# Patient Record
Sex: MALE | Race: BLACK OR AFRICAN AMERICAN | NOT HISPANIC OR LATINO | Employment: FULL TIME | URBAN - METROPOLITAN AREA
[De-identification: names, ages, dates, MRNs, and addresses within clinical notes are randomized per-mention and may not be internally consistent; named-entity substitution may affect disease eponyms.]

---

## 2017-02-16 ENCOUNTER — LAB VISIT (OUTPATIENT)
Dept: LAB | Facility: HOSPITAL | Age: 62
End: 2017-02-16
Attending: NURSE PRACTITIONER
Payer: COMMERCIAL

## 2017-02-16 DIAGNOSIS — E11.9 TYPE 2 DIABETES MELLITUS WITHOUT COMPLICATION, WITHOUT LONG-TERM CURRENT USE OF INSULIN: ICD-10-CM

## 2017-02-16 LAB
ALBUMIN SERPL BCP-MCNC: 3.6 G/DL
ALP SERPL-CCNC: 103 U/L
ALT SERPL W/O P-5'-P-CCNC: 14 U/L
ANION GAP SERPL CALC-SCNC: 4 MMOL/L
AST SERPL-CCNC: 27 U/L
BILIRUB SERPL-MCNC: 0.5 MG/DL
BUN SERPL-MCNC: 11 MG/DL
CALCIUM SERPL-MCNC: 9.5 MG/DL
CHLORIDE SERPL-SCNC: 106 MMOL/L
CHOLEST/HDLC SERPL: 3.6 {RATIO}
CO2 SERPL-SCNC: 26 MMOL/L
CREAT SERPL-MCNC: 1.1 MG/DL
EST. GFR  (AFRICAN AMERICAN): >60 ML/MIN/1.73 M^2
EST. GFR  (NON AFRICAN AMERICAN): >60 ML/MIN/1.73 M^2
GLUCOSE SERPL-MCNC: 217 MG/DL
HDL/CHOLESTEROL RATIO: 28.1 %
HDLC SERPL-MCNC: 139 MG/DL
HDLC SERPL-MCNC: 39 MG/DL
LDLC SERPL CALC-MCNC: 88.4 MG/DL
NONHDLC SERPL-MCNC: 100 MG/DL
POTASSIUM SERPL-SCNC: 4.3 MMOL/L
PROT SERPL-MCNC: 7.7 G/DL
SODIUM SERPL-SCNC: 136 MMOL/L
TRIGL SERPL-MCNC: 58 MG/DL
TSH SERPL DL<=0.005 MIU/L-ACNC: 0.88 UIU/ML

## 2017-02-16 PROCEDURE — 83036 HEMOGLOBIN GLYCOSYLATED A1C: CPT

## 2017-02-16 PROCEDURE — 80053 COMPREHEN METABOLIC PANEL: CPT

## 2017-02-16 PROCEDURE — 80061 LIPID PANEL: CPT

## 2017-02-16 PROCEDURE — 84443 ASSAY THYROID STIM HORMONE: CPT

## 2017-02-16 PROCEDURE — 36415 COLL VENOUS BLD VENIPUNCTURE: CPT | Mod: PO

## 2017-02-17 LAB
ESTIMATED AVG GLUCOSE: 203 MG/DL
HBA1C MFR BLD HPLC: 8.7 %

## 2017-02-24 ENCOUNTER — PATIENT OUTREACH (OUTPATIENT)
Dept: ADMINISTRATIVE | Facility: HOSPITAL | Age: 62
End: 2017-02-24

## 2017-02-24 NOTE — LETTER
February 24, 2017        We are seeing Misha Simpson, 1955, at [facility]. Trino Snyder MD is their primary care physician. To help with our [place] maintenance records could you please send the following:     Copy of Last Eye Exam     Please fax to [facility] at [phone], attention Laura Lakhani LPN.    Thank-you in advance for your assistance. If you have any questions or concerns please contact me at [phone].     Laura Lakhani LPN  Care Coordination Department  [facility]

## 2017-02-24 NOTE — PROGRESS NOTES
Per note dated 8/18/16 by Marion Mart NP, pt had eye exam at [facility]. Faxed request for results of last eye exam from [facility].

## 2017-02-27 ENCOUNTER — OFFICE VISIT (OUTPATIENT)
Dept: FAMILY MEDICINE | Facility: CLINIC | Age: 62
End: 2017-02-27

## 2017-02-27 VITALS
DIASTOLIC BLOOD PRESSURE: 70 MMHG | WEIGHT: 185 LBS | BODY MASS INDEX: 25.06 KG/M2 | SYSTOLIC BLOOD PRESSURE: 125 MMHG | HEART RATE: 53 BPM | HEIGHT: 72 IN

## 2017-02-27 DIAGNOSIS — Z02.89 PHYSICAL EXAMINATION OF EMPLOYEE: Primary | ICD-10-CM

## 2017-02-27 PROCEDURE — 99203 OFFICE O/P NEW LOW 30 MIN: CPT | Mod: ,,, | Performed by: FAMILY MEDICINE

## 2017-02-27 RX ORDER — METFORMIN HYDROCHLORIDE 1000 MG/1
1000 TABLET ORAL 2 TIMES DAILY WITH MEALS
COMMUNITY
End: 2017-03-09 | Stop reason: SDUPTHER

## 2017-03-09 ENCOUNTER — OFFICE VISIT (OUTPATIENT)
Dept: DIABETES | Facility: CLINIC | Age: 62
End: 2017-03-09
Payer: COMMERCIAL

## 2017-03-09 VITALS
WEIGHT: 182.81 LBS | BODY MASS INDEX: 24.76 KG/M2 | HEIGHT: 72 IN | DIASTOLIC BLOOD PRESSURE: 76 MMHG | SYSTOLIC BLOOD PRESSURE: 128 MMHG

## 2017-03-09 DIAGNOSIS — E11.69 COMBINED HYPERLIPIDEMIA ASSOCIATED WITH TYPE 2 DIABETES MELLITUS: ICD-10-CM

## 2017-03-09 DIAGNOSIS — I15.2 HYPERTENSION ASSOCIATED WITH DIABETES: ICD-10-CM

## 2017-03-09 DIAGNOSIS — E78.2 COMBINED HYPERLIPIDEMIA ASSOCIATED WITH TYPE 2 DIABETES MELLITUS: ICD-10-CM

## 2017-03-09 DIAGNOSIS — E11.59 HYPERTENSION ASSOCIATED WITH DIABETES: ICD-10-CM

## 2017-03-09 DIAGNOSIS — F17.200 SMOKING: ICD-10-CM

## 2017-03-09 DIAGNOSIS — E11.9 TYPE 2 DIABETES MELLITUS WITHOUT COMPLICATION, WITHOUT LONG-TERM CURRENT USE OF INSULIN: Primary | ICD-10-CM

## 2017-03-09 LAB — GLUCOSE SERPL-MCNC: 323 MG/DL (ref 70–110)

## 2017-03-09 PROCEDURE — 82948 REAGENT STRIP/BLOOD GLUCOSE: CPT | Mod: S$GLB,,, | Performed by: NURSE PRACTITIONER

## 2017-03-09 PROCEDURE — 2022F DILAT RTA XM EVC RTNOPTHY: CPT | Mod: S$GLB,,, | Performed by: NURSE PRACTITIONER

## 2017-03-09 PROCEDURE — 3074F SYST BP LT 130 MM HG: CPT | Mod: S$GLB,,, | Performed by: NURSE PRACTITIONER

## 2017-03-09 PROCEDURE — 4010F ACE/ARB THERAPY RXD/TAKEN: CPT | Mod: S$GLB,,, | Performed by: NURSE PRACTITIONER

## 2017-03-09 PROCEDURE — 1160F RVW MEDS BY RX/DR IN RCRD: CPT | Mod: S$GLB,,, | Performed by: NURSE PRACTITIONER

## 2017-03-09 PROCEDURE — 99999 PR PBB SHADOW E&M-EST. PATIENT-LVL III: CPT | Mod: PBBFAC,,, | Performed by: NURSE PRACTITIONER

## 2017-03-09 PROCEDURE — 99214 OFFICE O/P EST MOD 30 MIN: CPT | Mod: S$GLB,,, | Performed by: NURSE PRACTITIONER

## 2017-03-09 PROCEDURE — 3045F PR MOST RECENT HEMOGLOBIN A1C LEVEL 7.0-9.0%: CPT | Mod: S$GLB,,, | Performed by: NURSE PRACTITIONER

## 2017-03-09 PROCEDURE — 3078F DIAST BP <80 MM HG: CPT | Mod: S$GLB,,, | Performed by: NURSE PRACTITIONER

## 2017-03-09 RX ORDER — METFORMIN HYDROCHLORIDE 1000 MG/1
1000 TABLET ORAL 2 TIMES DAILY WITH MEALS
Qty: 60 TABLET | Refills: 3 | Status: SHIPPED | OUTPATIENT
Start: 2017-03-09 | End: 2017-10-02 | Stop reason: SDUPTHER

## 2017-03-09 RX ORDER — LANCETS
EACH MISCELLANEOUS
Qty: 100 EACH | Refills: 11 | Status: SHIPPED | OUTPATIENT
Start: 2017-03-09 | End: 2019-03-25

## 2017-03-09 NOTE — MR AVS SNAPSHOT
[facility]  [address] [place] [address]  Phone: [phone]  Fax: [phone]                  Misha Simpson   3/9/2017 8:30 AM   Office Visit    Description:  Male : 1955   Provider:  CICI Chamberlain, MANGO   Department:  [facility]           Reason for Visit     Diabetes           Diagnoses this Visit        Comments    Type 2 diabetes mellitus without complication, without long-term current use of insulin    -  Primary     Smoking         Combined hyperlipidemia associated with type 2 diabetes mellitus         Hypertension associated with diabetes                To Do List           Future Appointments        Provider Department Dept Phone    3/30/2017 4:30 PM CICI Chamberlain, MANGO [facility] [phone]    2017 2:00 PM Sheila Mcgrath DPM [facility] [phone]      Goals (5 Years of Data)              Today    17    Blood Pressure < 140/90   128/76  128/76      HEMOGLOBIN A1C < 7.0       8.7      Follow-Up and Disposition     Return in about 3 weeks (around 3/30/2017) for Trulicity teaching.       These Medications        Disp Refills Start End    dulaglutide (TRULICITY) 0.75 mg/0.5 mL PnIj 4 Syringe 0 3/9/2017     Inject 0.75 mg into the skin every 7 days. - Subcutaneous    Pharmacy: [facility]  EMILY Erazo [address] Ph #: [phone]         [facility] On [facility]     [facility] -  Assistance  Registered nurses in the [facility] Center provide clinical advisement, health education, appointment booking, and other advisory services.  Call for this free service at [phone].             Medications           Message regarding Medications     Verify the changes and/or additions to your medication regime listed below are the same as discussed with your clinician today.  If any of these changes or additions are  incorrect, please notify your healthcare provider.        START taking these NEW medications        Refills    dulaglutide (TRULICITY) 0.75 mg/0.5 mL PnIj 0    Sig: Inject 0.75 mg into the skin every 7 days.    Class: Print    Route: Subcutaneous           Verify that the below list of medications is an accurate representation of the medications you are currently taking.  If none reported, the list may be blank. If incorrect, please contact your healthcare provider. Carry this list with you in case of emergency.           Current Medications     amlodipine (NORVASC) 5 MG tablet TAKE 1 TABLET BY MOUTH ONCE DAILY    benazepril (LOTENSIN) 40 MG tablet TAKE 1 TABLET BY MOUTH ONCE DAILY    blood sugar diagnostic (BLOOD GLUCOSE TEST) Strp Test glucose 3 x daily    hydrochlorothiazide (MICROZIDE) 12.5 mg capsule TAKE ONE CAPSULE BY MOUTH ONCE DAILY    lancets Misc As directed    metformin (GLUCOPHAGE) 1000 MG tablet Take 1,000 mg by mouth 2 (two) times daily with meals.     nateglinide (STARLIX) 120 MG tablet Take 1 tablet (120 mg total) by mouth 2 (two) times daily before meals.    sitagliptan-metformin (JANUMET) 50-1,000 mg per tablet Take 1 tablet by mouth 2 (two) times daily with meals.    dulaglutide (TRULICITY) 0.75 mg/0.5 mL PnIj Inject 0.75 mg into the skin every 7 days.           Clinical Reference Information           Your Vitals Were     BP Height Weight BMI       128/76 6' (1.829 m) 82.9 kg (182 lb 12.8 oz) 24.79 kg/m2       Blood Pressure          Most Recent Value    BP  128/76      Allergies as of 3/9/2017     Aspirin      Immunizations Administered on Date of Encounter - 3/9/2017     None      Orders Placed During Today's Visit      Normal Orders This Visit    POCT glucose          3/9/2017  8:49 AM - Rasta Saldana LPN      Component Results     Component Value Flag Ref Range Units Status    POC Glucose 323 (A) 70 - 110 mg/dL Final            [facility] Sign-Up     Activating your [facility] account is as  easy as 1-2-3!     1) Visit [email or web address], select Sign Up Now, enter this activation code and your date of birth, then select Next.  [email or web address]  Expires: 4/23/2017  9:24 AM      2) Create a username and password to use when you visit [facility] in the future and select a security question in case you lose your password and select Next.    3) Enter your e-mail address and click Sign Up!    Additional Information  If you have questions, please e-mail [email or web address] or call [phone] to talk to our [facility] staff. Remember, [facility] is NOT to be used for urgent needs. For medical emergencies, dial 911.         Smoking Cessation     If you would like to quit smoking:   You may be eligible for free services if you are a [place] resident and started smoking cigarettes before September 1, 1988.  Call the Smoking Cessation Trust ([facility]) toll free at [phone] or [phone].   Call 5-045-QUIT-NOW if you do not meet the above criteria.            Language Assistance Services     ATTENTION: Language assistance services are available, free of charge. Please call [phone].      ATENCIÓN: Si habla kale, tiene a higgins disposición servicios gratuitos de asistencia lingüística. Llame al [phone].     CHÚ Ý: N?u b?n nói Ti?ng Vi?t, có các d?ch v? h? tr? ngôn ng? mi?n phí dành cho b?n. G?i s? [phone].         [facility] complies with applicable Federal civil rights laws and does not discriminate on the basis of race, color, national origin, age, disability, or sex.

## 2017-03-09 NOTE — PROGRESS NOTES
PCP: Trino Snyder MD    Subjective:     Chief Complaint: Diabetes Follow Up    HISTORY OF PRESENT ILLNESS: 61 year old  male presenting, with wife, for follow up on diabetes.  Patient has had Type II diabetes for several years without complications. He attended the first diabetes education class, but missed the second class.  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  Blood glucose testing is performed regularly.      He forgot his meter, but fasting values range 135 - 165.  Not monitoring other times.  He denies any recent hospital admissions, emergency room visits, hypoglycemia, syncope, or diaphoresis.   Also, denies polyuria or polydipsia, no unusual visual symptoms    His blood sugar in clinic today is:  Lab Results   Component Value Date    POCGLU 323 (A) 03/09/2017   He forgot to take his Starlix this am and patient ate 4 pancakes with syrup, coffee.     Height: 6' (182.9 cm)  //  Weight: 82.9 kg (182 lb 12.8 oz), Body mass index is 24.79 kg/(m^2).  Home Blood Glucose reading this AM: Not Taken    DM MEDICATIONS:   Janumet 50 -1,000 mg BID   Starlix 120 mg once a day    Lab Results   Component Value Date    HGBA1C 8.7 (H) 02/16/2017     ADA recommends less than 7.0.  Other labs reviewed.     REVIEW OF SYSTEMS:  General: Denies fever, nausea, vomiting, chills, or change in appetite.  HEENT: Denies impaired hearing, dysphagia.  Respiratory: Denies shortness of breath, cough, or wheezing.  Cardiovascular: Denies chest pain, palpitations.  GI: Denies hematochezia.  : Denies hematuria, diarrhea, dysuria or frequency.  MS:  Normal gait. Denies difficulty with mobility, muscle or joint pain.  SKIN: Denies rashes and lesions.  Neuro: Denies numbness or tingling in the hands or feet.   PSYCH: Denies depression or anxiety. No suicidal ideations.  ENDO: See HPI.    STANDARDS OF CARE:  Eye doctor: [facility], Last exam July 2016.    Dental exam: Recommend regular exams; denies  gums bleeding.  Podiatry doctor: No podiatrist.     ACTIVITY LEVEL: No routine exercise.  MEAL PLANNING: Number of meals per day - 3. Breakfast can be sausage, hodgson, and homemade biscuit.  Lunch, usually skips, or peas, corn, baked chicken. Dinner can be lima beans with ham, sausage, OR roast, a little rice.  Number of snacks per day - 2.  Patient is encouraged to consume 45 - 60 grams of carbohydrates in each meal and 1800 calorie per day.      Per dietary recall, patient is not limiting carbohydrates, saturated fats and sodium.     BLOOD GLUCOSE TESTING: Self-monitoring   SOCIAL HISTORY: .  Lives with spouse. Works as a canal . Smoking, 1 pack per day.     Objective:     PHYSICAL EXAMINATION:  GENERAL: WDWN  male in no acute distress, ambulatory, responds appropriately. AAO X 3.   NECK: Supple, no thyromegaly, no cervical or supraclavicular lymphadenopathy, no carotid bruit.  HEART: Regular rate and rhythm. No rubs, murmurs or gallops.  LUNGS: CTA bilaterally. Unlabored breathing, no use of accessory muscles.  MUSCULOSKELETAL: Normal gait and muscle strength.  ABDOMEN: Active bowel sounds X 4, no masses or tenderness.   SKIN: Warm, dry skin. No lesions or abrasions. Clean, dry well-healed injection sites.   NEUROLOGIC: Cranial nerves II-XII grossly intact.   FOOT EXAMINATION: Protective Sensation (w/ 10 gram monofilament):  Right: Intact  Left: Intact // Visual Inspection: Normal -  Bilateral  // Pedal Pulses:  Right: Present  Left: Present    Assessment:     EDUCATIONAL ASSESSMENT:  1. Impediments in learning class environment - NONE.  2. Needs improvement in self-care management skills.    1) Diabetes Type II - uncontrolled on Starlix, Janumet, A1C 8.7  2) Hypertension - controlled Norvasc, Lotensin, HCT    Plan:     1.) Patient was instructed to monitor blood glucose 2 - 3 x daily, fasting and ac dinner or at bedtime. Discussed ADA goal for fasting blood sugar, 80 - 130 mg/dL; pp  blood sugars below 180 mg/dl. Also, discussed prevention of hypoglycemia and the need to adjust goals to higher levels if persistent hypoglycemia.  Reminded to bring BG records or meter to each visit for review.  2.) Reviewed pathophysiology of Type II diabetes, complications related to the disease, importance of annual dilated eye exam and daily foot examination.  3.) We discussed the ADA recommendations: Hemoglobin A1c below 7.0 %.  All patients with diabetes should be on statins unless contraindicated.  ACE or ARB therapy if not contraindicated. 4.) We are going to stop the Janumet and start patient on Trulicity 0.75 mg weekly, explained MOA.  Sample voucher given.  If tolerated, will increase to max dose of 1.5 mg weekly.   Discussed importance of rotating sites.  Reviewed possible GI side effects.   He will continue plain metformin 1,000 mg BID.  For now, will hold the Starlix and see how blood sugars respond to Trulicity.   5.) Meal planning teaching: Carbohydrate definition - one serving is 15 gms. Carbohydrate spacing - carbohydrates should be spaced into approximately 3 meals with 2 snacks (of one carbohydrate) between meals or at bedtime. Increase vegetable intake to 2 or more cups of vegetables per day as well as 2 fruit servings. Recommended low saturated fat, low sodium diet to aid in control of hypertension and cholesterol.  6.) Discussed activity, benefits, methods, and precautions. Recommended patient start or continue some form of exercise and increase as tolerated to 30 minutes per day to facilitate weight loss and aid in control of BGs.  7.) Return to clinic in 3 weeks for follow up. He was explained the above plan and given opportunity to ask questions. Advised to call clinic with any further questions or concerns.    Marion Mart, NP-C, CDE

## 2017-03-14 RX ORDER — METFORMIN HYDROCHLORIDE 1000 MG/1
TABLET ORAL
Qty: 180 TABLET | Refills: 3 | Status: SHIPPED | OUTPATIENT
Start: 2017-03-14 | End: 2017-07-06 | Stop reason: SDUPTHER

## 2017-03-23 ENCOUNTER — TELEPHONE (OUTPATIENT)
Dept: DIABETES | Facility: CLINIC | Age: 62
End: 2017-03-23

## 2017-03-23 NOTE — TELEPHONE ENCOUNTER
Patient informed of NP's orders. Voiced understanding and stated he has not started taking Trulcity as of yet, but will start on Sunday.

## 2017-03-23 NOTE — TELEPHONE ENCOUNTER
Contacted pt and informed him of med orders. He voiced understanding, and also said he wanted instructions left on VM. Voice mail left with detailed instructions.

## 2017-03-23 NOTE — TELEPHONE ENCOUNTER
Patient blood sugars are improving nicely. Continue current medications.  If he continues to have low BG < 70, he should let us know so I can adjust his other medications. Thanks.

## 2017-03-23 NOTE — TELEPHONE ENCOUNTER
Please just make sure that patient knows to stop the Starlix and Janumet when he starts Trulicity.  He should only be taking Trulicity and plain metformin.

## 2017-03-23 NOTE — TELEPHONE ENCOUNTER
Patient is taking Trulicity 0.75 mg weekly, and Metformin 1,000 mg BID.    3/15/17  Fasting 118, Before Dinner 91, After Dinner 148  3/16/17  174, After Lunch 82, --- , 114  3/17/17  116, ---, 118  3/18/17  128, ---, 139  3/19/17  ---, ---, 69  3/20/17  ---, After Breakfast 160, ---,   3/21/17  140

## 2017-03-30 ENCOUNTER — OFFICE VISIT (OUTPATIENT)
Dept: DIABETES | Facility: CLINIC | Age: 62
End: 2017-03-30
Payer: COMMERCIAL

## 2017-03-30 VITALS
WEIGHT: 178.81 LBS | BODY MASS INDEX: 24.22 KG/M2 | HEIGHT: 72 IN | SYSTOLIC BLOOD PRESSURE: 116 MMHG | DIASTOLIC BLOOD PRESSURE: 78 MMHG

## 2017-03-30 DIAGNOSIS — F17.200 SMOKING: ICD-10-CM

## 2017-03-30 DIAGNOSIS — E11.9 TYPE 2 DIABETES MELLITUS WITHOUT COMPLICATION, WITHOUT LONG-TERM CURRENT USE OF INSULIN: Primary | ICD-10-CM

## 2017-03-30 DIAGNOSIS — E11.59 HYPERTENSION ASSOCIATED WITH DIABETES: ICD-10-CM

## 2017-03-30 DIAGNOSIS — I15.2 HYPERTENSION ASSOCIATED WITH DIABETES: ICD-10-CM

## 2017-03-30 DIAGNOSIS — E11.69 COMBINED HYPERLIPIDEMIA ASSOCIATED WITH TYPE 2 DIABETES MELLITUS: ICD-10-CM

## 2017-03-30 DIAGNOSIS — E78.2 COMBINED HYPERLIPIDEMIA ASSOCIATED WITH TYPE 2 DIABETES MELLITUS: ICD-10-CM

## 2017-03-30 LAB — GLUCOSE SERPL-MCNC: 95 MG/DL (ref 70–110)

## 2017-03-30 PROCEDURE — 4010F ACE/ARB THERAPY RXD/TAKEN: CPT | Mod: S$GLB,,, | Performed by: NURSE PRACTITIONER

## 2017-03-30 PROCEDURE — 1160F RVW MEDS BY RX/DR IN RCRD: CPT | Mod: S$GLB,,, | Performed by: NURSE PRACTITIONER

## 2017-03-30 PROCEDURE — 82948 REAGENT STRIP/BLOOD GLUCOSE: CPT | Mod: S$GLB,,, | Performed by: NURSE PRACTITIONER

## 2017-03-30 PROCEDURE — 99999 PR PBB SHADOW E&M-EST. PATIENT-LVL III: CPT | Mod: PBBFAC,,, | Performed by: NURSE PRACTITIONER

## 2017-03-30 PROCEDURE — 3045F PR MOST RECENT HEMOGLOBIN A1C LEVEL 7.0-9.0%: CPT | Mod: S$GLB,,, | Performed by: NURSE PRACTITIONER

## 2017-03-30 PROCEDURE — 99213 OFFICE O/P EST LOW 20 MIN: CPT | Mod: S$GLB,,, | Performed by: NURSE PRACTITIONER

## 2017-03-30 PROCEDURE — 3074F SYST BP LT 130 MM HG: CPT | Mod: S$GLB,,, | Performed by: NURSE PRACTITIONER

## 2017-03-30 PROCEDURE — 3078F DIAST BP <80 MM HG: CPT | Mod: S$GLB,,, | Performed by: NURSE PRACTITIONER

## 2017-03-30 PROCEDURE — 2022F DILAT RTA XM EVC RTNOPTHY: CPT | Mod: S$GLB,,, | Performed by: NURSE PRACTITIONER

## 2017-03-30 NOTE — PROGRESS NOTES
PCP: Trino Snyder MD    Subjective:     Chief Complaint: Diabetes Follow Up    HISTORY OF PRESENT ILLNESS: 61 year old  male presenting, with wife, for follow up on diabetes.  Patient has had Type II diabetes for several years without complications. He has attended the first diabetes education class, but missed the second class.  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  Blood glucose testing is performed regularly.  Per meter, 87 - 135, <161>; ac lunch 87, 89; ac dinner 71- 106; hs 87 - 132.      He denies any recent hospital admissions, ER visits, hypoglycemia, syncope, or diaphoresis.   Also, denies polyuria or polydipsia, no unusual visual symptoms. He had one episode of hypo, BG 60, which he treated with crackers.    Height: 6' (182.9 cm)  //  Weight: 81.1 kg (178 lb 12.8 oz), Body mass index is 24.25 kg/(m^2).  Home Blood Glucose reading this AM: 112 mg/dl fasting  Blood Glucose in clinic: 95 mg/dl at 4:17 pm    DM MEDICATIONS:   Metformin 1,000 mg BID  Trulicity 0.75 mg weekly    Lab Results   Component Value Date    HGBA1C 8.7 (H) 02/16/2017     ADA recommends less than 7.0.  Other labs reviewed.     REVIEW OF SYSTEMS:  General: Denies fever, nausea, vomiting, chills, or change in appetite.  HEENT: Denies impaired hearing, dysphagia.  Respiratory: Denies shortness of breath, cough, or wheezing.  Cardiovascular: Denies chest pain, palpitations.  GI: Denies hematochezia.  : Denies hematuria, diarrhea, dysuria or frequency.  MS:  Normal gait. Denies difficulty with mobility, muscle or joint pain.  SKIN: Denies rashes and lesions.  Neuro: Denies numbness or tingling in the hands or feet.   PSYCH: Denies depression or anxiety. No suicidal ideations.  ENDO: See HPI.    STANDARDS OF CARE:  Eye doctor: [facility], Last exam July 2016.    Dental exam: Recommend regular exams; denies gums bleeding.  Podiatry doctor: No podiatrist.     ACTIVITY LEVEL: No routine exercise.  MEAL  PLANNING: Number of meals per day - 3. Breakfast can be sausage, hodgson, and homemade biscuit.  Lunch, usually skips, or peas, corn, baked chicken. Dinner can be lima beans with ham, sausage, OR roast, a little rice.  Number of snacks per day - 2.  Patient is encouraged to consume 45 - 60 grams of carbohydrates in each meal and 1800 calorie per day.      Per dietary recall, patient is not limiting carbohydrates, saturated fats and sodium.     BLOOD GLUCOSE TESTING: Self-monitoring   SOCIAL HISTORY: .  Lives with spouse. Works as a canal . Smoking, 1 pack per day.     Objective:     PHYSICAL EXAMINATION:  GENERAL: WDWN  male in no acute distress, ambulatory, responds appropriately. AAO X 3.   NECK: Supple, no thyromegaly, no cervical or supraclavicular lymphadenopathy, no carotid bruit.  HEART: Regular rate and rhythm. No rubs, murmurs or gallops.  LUNGS: CTA bilaterally. Unlabored breathing, no use of accessory muscles.  MUSCULOSKELETAL: Normal gait and muscle strength.  ABDOMEN: Active bowel sounds X 4, no masses or tenderness.   SKIN: Warm, dry skin. No lesions or abrasions. Clean, dry well-healed injection sites.   NEUROLOGIC: Cranial nerves II-XII grossly intact.   FOOT EXAMINATION: Deferred, Last exam 03 / 2017    Assessment:     EDUCATIONAL ASSESSMENT:  1. Impediments in learning class environment - NONE.  2. Needs improvement in self-care management skills.    1) Diabetes Type II - per meter, improving control on Trulicity, metformin,  A1C 8.7  2) Hypertension - controlled Norvasc, Lotensin, HCT  3) Tobacco Abuse    Plan:     1.) Patient was instructed to monitor blood glucose 2 - 3 x daily, fasting and ac dinner or at bedtime. Discussed ADA goal for fasting blood sugar, 80 - 130 mg/dL; pp blood sugars below 180 mg/dl. Also, discussed prevention of hypoglycemia and the need to adjust goals to higher levels if persistent hypoglycemia.  Reminded to bring BG records or meter to each  visit for review.  2.) Reviewed pathophysiology of Type II diabetes, complications related to the disease, importance of annual dilated eye exam and daily foot examination.  3.) We discussed the ADA recommendations: Hemoglobin A1c below 7.0 %.  All patients with diabetes should be on statins unless contraindicated.  ACE or ARB therapy if not contraindicated.   4.) Continue Trulicity 0.75 mg weekly.  Continue metformin 1,000 mg BID.  This regimen appears to be controlling blood sugars so will continue for now.  I explained to patient that he should keep snacks with him while working  just in case he experiences a low blood sugar.  I also advised that he should not skip consecutive meals throughout the day.  He voiced understanding.  5.) Meal planning teaching: Carbohydrate definition - one serving is 15 gms. Carbohydrate spacing - carbohydrates should be spaced into approximately 3 meals with 2 snacks (of one carbohydrate) between meals or at bedtime. Increase vegetable intake to 2 or more cups of vegetables per day as well as 2 fruit servings. Recommended low saturated fat, low sodium diet to aid in control of hypertension and cholesterol.  6.) Discussed activity, benefits, methods, and precautions. Recommended patient start or continue some form of exercise and increase as tolerated to 30 minutes per day to facilitate weight loss and aid in control of BGs.  7.) Return to clinic in 3 months for follow up.  He was explained the above plan and given opportunity to ask questions. Advised to call clinic with any further questions or concerns.    Marion Mart, JEANETTE-C, CDE

## 2017-04-05 DIAGNOSIS — M25.561 RIGHT KNEE PAIN, UNSPECIFIED CHRONICITY: Primary | ICD-10-CM

## 2017-04-07 ENCOUNTER — HOSPITAL ENCOUNTER (OUTPATIENT)
Dept: RADIOLOGY | Facility: HOSPITAL | Age: 62
Discharge: HOME | End: 2017-04-07
Attending: ORTHOPAEDIC SURGERY
Payer: COMMERCIAL

## 2017-04-07 ENCOUNTER — OFFICE VISIT (OUTPATIENT)
Dept: ORTHOPEDICS | Facility: CLINIC | Age: 62
End: 2017-04-07
Payer: COMMERCIAL

## 2017-04-07 VITALS
BODY MASS INDEX: 24.11 KG/M2 | SYSTOLIC BLOOD PRESSURE: 108 MMHG | WEIGHT: 178 LBS | HEART RATE: 64 BPM | DIASTOLIC BLOOD PRESSURE: 73 MMHG | HEIGHT: 72 IN

## 2017-04-07 DIAGNOSIS — M17.0 PRIMARY OSTEOARTHRITIS OF BOTH KNEES: Primary | ICD-10-CM

## 2017-04-07 DIAGNOSIS — M25.561 RIGHT KNEE PAIN, UNSPECIFIED CHRONICITY: ICD-10-CM

## 2017-04-07 DIAGNOSIS — S83.206A UNSPECIFIED TEAR OF UNSPECIFIED MENISCUS, CURRENT INJURY, RIGHT KNEE, INITIAL ENCOUNTER: ICD-10-CM

## 2017-04-07 DIAGNOSIS — Z02.6 ENCOUNTER RELATED TO WORKER'S COMPENSATION CLAIM: ICD-10-CM

## 2017-04-07 DIAGNOSIS — S89.91XA RIGHT KNEE INJURY, INITIAL ENCOUNTER: ICD-10-CM

## 2017-04-07 PROCEDURE — 99204 OFFICE O/P NEW MOD 45 MIN: CPT | Mod: 25,S$GLB,, | Performed by: ORTHOPAEDIC SURGERY

## 2017-04-07 PROCEDURE — 73564 X-RAY EXAM KNEE 4 OR MORE: CPT | Mod: 26,50,, | Performed by: RADIOLOGY

## 2017-04-07 PROCEDURE — 20610 DRAIN/INJ JOINT/BURSA W/O US: CPT | Mod: RT,S$GLB,, | Performed by: ORTHOPAEDIC SURGERY

## 2017-04-07 PROCEDURE — 73564 X-RAY EXAM KNEE 4 OR MORE: CPT | Mod: TC,50,PO

## 2017-04-07 PROCEDURE — 99999 PR PBB SHADOW E&M-EST. PATIENT-LVL III: CPT | Mod: PBBFAC,,, | Performed by: ORTHOPAEDIC SURGERY

## 2017-04-07 RX ORDER — DICLOFENAC SODIUM 10 MG/G
2 GEL TOPICAL 4 TIMES DAILY
Qty: 300 G | Refills: 1 | Status: SHIPPED | OUTPATIENT
Start: 2017-04-07 | End: 2018-08-06

## 2017-04-07 RX ORDER — TRIAMCINOLONE ACETONIDE 40 MG/ML
80 INJECTION, SUSPENSION INTRA-ARTICULAR; INTRAMUSCULAR
Status: DISCONTINUED | OUTPATIENT
Start: 2017-04-07 | End: 2017-04-07 | Stop reason: HOSPADM

## 2017-04-07 RX ADMIN — TRIAMCINOLONE ACETONIDE 80 MG: 40 INJECTION, SUSPENSION INTRA-ARTICULAR; INTRAMUSCULAR at 01:04

## 2017-04-07 NOTE — PROCEDURES
Large Joint Aspiration/Injection  Date/Time: 4/7/2017 1:48 PM  Performed by: CRISTAL LAI  Authorized by: CRISTAL LAI     Consent Done?:  Yes (Verbal)  Indications:  Pain  Procedure site marked: Yes    Timeout: Prior to procedure the correct patient, procedure, and site was verified      Location:  Knee  Site:  R knee  Prep: Patient was prepped and draped in usual sterile fashion    Ultrasonic Guidance for needle placement: No  Needle size:  22 G  Approach:  Anterolateral  Medications:  80 mg triamcinolone acetonide 40 mg/mL  Patient tolerance:  Patient tolerated the procedure well with no immediate complications

## 2017-04-07 NOTE — Clinical Note
Right knee injury with likely meniscus injury; will attempt conservative therapy and f/u in 2 weeks. Injection today can return to work on Monday.

## 2017-04-07 NOTE — MR AVS SNAPSHOT
[facility]  [address] [facility] [address]  [place] [address]  Phone: [phone]                  Misha Simpson   2017 12:00 PM   Office Visit    Description:  Male : 1955   Provider:  Daniele Starks MD   Department:  [facility]           Reason for Visit     Right Knee - Pain           Diagnoses this Visit        Comments    Primary osteoarthritis of both knees    -  Primary     Right knee pain, unspecified chronicity         Right knee injury, initial encounter         Unspecified tear of unspecified meniscus, current injury, right knee, initial encounter         Encounter related to worker's compensation claim                To Do List           Future Appointments        Provider Department Dept Phone    2017 11:15 AM MD [place] [facility] [phone]    2017 3:20 PM RADHA Bunch  Podiatry [phone]    2017 4:30 PM Marion Mart, JEANETTE-C, CDE [place] - [facility] [phone]      Goals (5 Years of Data)              Today    3/30/17    3/9/17    Blood Pressure < 140/90   108/73  108/73  108/73    HEMOGLOBIN A1C < 7.0             Follow-Up and Disposition     Return in about 12 days (around 2017).       These Medications        Disp Refills Start End    diclofenac sodium (VOLTAREN) 1 % Gel 300 g 1 2017    Apply 2 g topically 4 (four) times daily. Cannot take NSAIDs secondary GI upset - Topical    Pharmacy: [facility] - EMILY Erazo [address] Ph #: [phone]         [facility]     [facility] Nurse Care [facility] - / Assistance  Unless otherwise directed by your provider, please contact [facility], our nurse care line that is available for / assistance.     Registered nurses in the [facility] provide: appointment scheduling, clinical advisement, health education, and other advisory services.  Call: [phone] (toll  free)               Medications           Message regarding Medications     Verify the changes and/or additions to your medication regime listed below are the same as discussed with your clinician today.  If any of these changes or additions are incorrect, please notify your healthcare provider.        START taking these NEW medications        Refills    diclofenac sodium (VOLTAREN) 1 % Gel 1    Sig: Apply 2 g topically 4 (four) times daily. Cannot take NSAIDs secondary GI upset    Class: Normal    Route: Topical           Verify that the below list of medications is an accurate representation of the medications you are currently taking.  If none reported, the list may be blank. If incorrect, please contact your healthcare provider. Carry this list with you in case of emergency.           Current Medications     amlodipine (NORVASC) 5 MG tablet TAKE 1 TABLET BY MOUTH ONCE DAILY    benazepril (LOTENSIN) 40 MG tablet TAKE 1 TABLET BY MOUTH ONCE DAILY    blood sugar diagnostic (BLOOD GLUCOSE TEST) Strp Test glucose 3 x daily    dulaglutide (TRULICITY) 0.75 mg/0.5 mL PnIj Inject 0.5 mLs (0.75 mg total) into the skin every 7 days.    hydrochlorothiazide (MICROZIDE) 12.5 mg capsule TAKE ONE CAPSULE BY MOUTH ONCE DAILY    lancets Misc As directed    metformin (GLUCOPHAGE) 1000 MG tablet Take 1 tablet (1,000 mg total) by mouth 2 (two) times daily with meals.    metformin (GLUCOPHAGE) 1000 MG tablet TAKE ONE TABLET BY MOUTH TWICE DAILY WITH MEALS    nateglinide (STARLIX) 120 MG tablet Take 1 tablet (120 mg total) by mouth 2 (two) times daily before meals.    diclofenac sodium (VOLTAREN) 1 % Gel Apply 2 g topically 4 (four) times daily. Cannot take NSAIDs secondary GI upset           Clinical Reference Information           Your Vitals Were     BP Pulse Height Weight BMI    108/73 64 6' (1.829 m) 80.7 kg (178 lb) 24.14 kg/m2      Blood Pressure          Most Recent Value    BP  108/73      Allergies as of 4/7/2017     Aspirin       Immunizations Administered on Date of Encounter - 4/7/2017     None      [facility] Sign-Up     Activating your [facility] account is as easy as 1-2-3!     1) Visit [email or web address], select Sign Up Now, enter this activation code and your date of birth, then select Next.  [email or web address]  Expires: 4/23/2017 10:24 AM      2) Create a username and password to use when you visit [facility] in the future and select a security question in case you lose your password and select Next.    3) Enter your e-mail address and click Sign Up!    Additional Information  If you have questions, please e-mail [email or web address] or call [phone] to talk to our [facility] staff. Remember, [facility] is NOT to be used for urgent needs. For medical emergencies, dial 911.         Smoking Cessation     If you would like to quit smoking:   You may be eligible for free services if you are a [place] resident and started smoking cigarettes before September 1, 1988.  Call the Smoking Cessation Trust ([facility]) toll free at ([phone] or [phone].   Call 0-017-QUIT-NOW if you do not meet the above criteria.   Contact us via email: [email or web address]   View our website for more information: [email or web address]        Language Assistance Services     ATTENTION: Language assistance services are available, free of charge. Please call [phone].      ATENCIÓN: Si habla español, tiene a higgins disposición servicios gratuitos de asistencia lingüística. Llame al [phone].     CHÚ Ý: N?u b?n nói Ti?ng Vi?t, có các d?ch v? h? tr? ngôn ng? mi?n phí dành cho b?n. G?i s? [phone].         [facility] complies with applicable Federal civil rights laws and does not discriminate on the basis of race, color, national origin, age, disability, or sex.

## 2017-04-07 NOTE — PROGRESS NOTES
Subjective:          Chief Complaint: Misha Simpson is a 61 y.o. male who had concerns including Pain of the Right Knee.    HPI Comments: Mr. Simpson stepped out of his truck at work into loose concrete and sustained his right knee injury on or about 3/1/2017. He is here today for evaluation. He has not had any relief of his pain. Pain increases with certain movements. He has not had any swelling but does have recurrent medial joint like pain.    Pain   This is a new problem. The current episode started more than 1 month ago. The problem occurs daily. The problem has been gradually worsening. Associated symptoms include arthralgias. Pertinent negatives include no chills or fever. The symptoms are aggravated by walking, twisting and standing. He has tried nothing for the symptoms. The treatment provided no relief.       Review of Systems   Constitution: Negative for chills and fever.   Cardiovascular:        HTN   Endocrine:        Type II DM   Musculoskeletal: Positive for arthralgias, arthritis and joint pain.   All other systems reviewed and are negative.                  Objective:        General: Misha is well-developed, well-nourished, appears stated age, in no acute distress, alert and oriented to time, place and person.     General    Vitals reviewed.  Constitutional: He is oriented to person, place, and time. He appears well-developed and well-nourished. No distress.   HENT:   Head: Normocephalic and atraumatic.   Nose: Nose normal.   Eyes: Pupils are equal, round, and reactive to light.   Cardiovascular: Normal rate.    Pulmonary/Chest: Effort normal.   Neurological: He is alert and oriented to person, place, and time.   Psychiatric: He has a normal mood and affect. His behavior is normal. Judgment and thought content normal.     General Musculoskeletal Exam   Gait: normal       Right Knee Exam     Inspection   Erythema: absent  Scars: absent  Swelling: absent  Effusion: effusion  Deformity:  deformity    Tenderness   The patient is tender to palpation of the medial joint line, medial retinaculum and patella.    Range of Motion   Extension: 0   Flexion: 140     Tests   Meniscus   Herbert:  Medial - negative Lateral - negative  Ligament Examination Lachman: normal (-1 to 2mm) PCL-Posterior Drawer: normal (0 to 2mm)     MCL - Valgus: normal (0 to 2mm)  LCL - Varus: normal  Posterolateral Corner: unstable (>15 degrees difference)  Patella   Patellar Apprehension: negative  Passive Patellar Tilt: neutral  Patellar Tracking: normal  Patellar Glide (quadrants): Lateral - 1   Medial - 2  Q-Angle at 90 degrees: normal  Patellar Grind: negative  J-Sign: none    Other   Sensation: normal    Left Knee Exam     Inspection   Erythema: absent  Scars: present  Swelling: absent  Effusion: absent  Deformity: deformity  Bruising: absent    Range of Motion   Extension: 0   Flexion: 140     Tests   Meniscus   Herbert:  Lateral - negative  Stability Lachman: normal (-1 to 2mm) PCL-Posterior Drawer: normal (0 to 2mm)  MCL - Valgus: normal (0 to 2mm)  LCL - Varus: normal (0 to 2mm)  Posterolateral Corner: unstable (>15 degrees difference)  Patella   Patellar Apprehension: negative  Passive Patellar Tilt: neutral  Patellar Tracking: normal  Patellar Glide (Quadrants): Lateral - 1 Medial - 2  Q-Angle at 90 degrees: normal  Patellar Grind: negative  J-Sign: J sign absent    Other   Sensation: normal    Right Hip Exam     Tests   Art: negative  Left Hip Exam     Tests   Art: negative          Muscle Strength   Right Lower Extremity   Hip Abduction: 5/5   Quadriceps:  5/5   Hamstrin/5     Vascular Exam     Right Pulses    Posterior Tibial:      2+        Left Pulses    Posterior Tibial:      2+        Edema  Right Lower Leg: absent  Left Lower Leg: absent      Current and previous radiographic studies and results were reviewed with the patient:     Standing views of both knees as well as lateral and sunrise views of both  knees were obtained.    There is degenerative arthrosis of the medial compartment of the left knee with joint space narrowing.  There is also degenerative arthrosis of the lateral compartment of the right knee with joint space narrowing.  The patellofemoral articulations are relatively well-maintained bilaterally.  No fracture or subluxation are identified.  There are no signs of joint effusion.   Impression    Degenerative arthrosis of both knees.           Assessment:       Encounter Diagnoses   Name Primary?    Right knee pain, unspecified chronicity     Right knee injury, initial encounter     Primary osteoarthritis of both knees Yes    Unspecified tear of unspecified meniscus, current injury, right knee, initial encounter     Encounter related to worker's compensation claim           Plan:         Right knee injection  Voltaren Gel  Note for work  F/U in 2 weeks

## 2017-04-19 ENCOUNTER — OFFICE VISIT (OUTPATIENT)
Dept: ORTHOPEDICS | Facility: CLINIC | Age: 62
End: 2017-04-19
Payer: COMMERCIAL

## 2017-04-19 VITALS
BODY MASS INDEX: 23.08 KG/M2 | HEIGHT: 72 IN | HEART RATE: 70 BPM | DIASTOLIC BLOOD PRESSURE: 64 MMHG | SYSTOLIC BLOOD PRESSURE: 102 MMHG | WEIGHT: 170.38 LBS

## 2017-04-19 DIAGNOSIS — M17.0 PRIMARY OSTEOARTHRITIS OF BOTH KNEES: ICD-10-CM

## 2017-04-19 DIAGNOSIS — M23.91 DERANGEMENT, KNEE INTERNAL, RIGHT: ICD-10-CM

## 2017-04-19 DIAGNOSIS — M25.561 ACUTE PAIN OF RIGHT KNEE: Primary | ICD-10-CM

## 2017-04-19 PROCEDURE — 99213 OFFICE O/P EST LOW 20 MIN: CPT | Mod: S$GLB,,, | Performed by: ORTHOPAEDIC SURGERY

## 2017-04-19 PROCEDURE — 99999 PR PBB SHADOW E&M-EST. PATIENT-LVL III: CPT | Mod: PBBFAC,,, | Performed by: ORTHOPAEDIC SURGERY

## 2017-04-19 NOTE — Clinical Note
Return to activities as tolerated F/U PRN if symptoms of swelling, catching or locking begin Note for work

## 2017-04-19 NOTE — PROGRESS NOTES
Subjective:          Chief Complaint: Misha Simpson is a 61 y.o. male who had concerns including Pain of the Left Knee and Pain of the Right Knee.    HPI Comments: Mr. Simpson stepped out of his truck at work into loose concrete and sustained his right knee injury on or about 3/1/2017. He is here today for f/u. He had an injection and is doing better with decreased swelling and no current mechanical symptoms. Some stiffness after prolonged sitting.    Pain: 1/10    Pain   This is a new problem. The current episode started more than 1 month ago. The problem occurs daily. The problem has been gradually worsening. Associated symptoms include arthralgias. Pertinent negatives include no chills or fever. The symptoms are aggravated by walking, twisting and standing. He has tried nothing for the symptoms. The treatment provided no relief.       Review of Systems   Constitution: Negative for chills and fever.   Cardiovascular:        HTN   Endocrine:        Type II DM   Musculoskeletal: Positive for arthralgias, arthritis, joint pain and stiffness.   All other systems reviewed and are negative.                  Objective:        General: Misha is well-developed, well-nourished, appears stated age, in no acute distress, alert and oriented to time, place and person.     General    Vitals reviewed.  Constitutional: He is oriented to person, place, and time. He appears well-developed and well-nourished. No distress.   HENT:   Head: Normocephalic and atraumatic.   Nose: Nose normal.   Eyes: Pupils are equal, round, and reactive to light.   Cardiovascular: Normal rate.    Pulmonary/Chest: Effort normal.   Neurological: He is alert and oriented to person, place, and time.   Psychiatric: He has a normal mood and affect. His behavior is normal. Judgment and thought content normal.     General Musculoskeletal Exam   Gait: normal       Right Knee Exam     Inspection   Erythema: absent  Scars: absent  Swelling: absent  Effusion:  effusion  Deformity: deformity    Tenderness   The patient is tender to palpation of the medial joint line.    Range of Motion   Extension: 0   Flexion: 140     Tests   Meniscus   Herbert:  Medial - negative Lateral - negative  Ligament Examination Lachman: normal (-1 to 2mm) PCL-Posterior Drawer: normal (0 to 2mm)     MCL - Valgus: normal (0 to 2mm)  LCL - Varus: normal  Posterolateral Corner: unstable (>15 degrees difference)  Patella   Patellar Apprehension: negative  Passive Patellar Tilt: neutral  Patellar Tracking: normal  Patellar Glide (quadrants): Lateral - 1   Medial - 2  Q-Angle at 90 degrees: normal  Patellar Grind: negative  J-Sign: none    Other   Sensation: normal    Left Knee Exam     Inspection   Erythema: absent  Scars: present  Swelling: absent  Effusion: absent  Deformity: deformity  Bruising: absent    Range of Motion   Extension: 0   Flexion: 140     Tests   Meniscus   Herbert:  Lateral - negative  Stability Lachman: normal (-1 to 2mm) PCL-Posterior Drawer: normal (0 to 2mm)  MCL - Valgus: normal (0 to 2mm)  LCL - Varus: normal (0 to 2mm)  Posterolateral Corner: unstable (>15 degrees difference)  Patella   Patellar Apprehension: negative  Passive Patellar Tilt: neutral  Patellar Tracking: normal  Patellar Glide (Quadrants): Lateral - 1 Medial - 2  Q-Angle at 90 degrees: normal  Patellar Grind: negative  J-Sign: J sign absent    Other   Sensation: normal    Right Hip Exam     Tests   Art: negative  Left Hip Exam     Tests   Art: negative          Muscle Strength   Right Lower Extremity   Hip Abduction: 5/5   Quadriceps:  5/5   Hamstrin/5     Vascular Exam     Right Pulses    Posterior Tibial:      2+        Left Pulses    Posterior Tibial:      2+        Edema  Right Lower Leg: absent  Left Lower Leg: absent      Previous radiographic studies and results were reviewed with the patient:     Standing views of both knees as well as lateral and sunrise views of both knees were  obtained.    There is degenerative arthrosis of the medial compartment of the left knee with joint space narrowing.  There is also degenerative arthrosis of the lateral compartment of the right knee with joint space narrowing.  The patellofemoral articulations are relatively well-maintained bilaterally.  No fracture or subluxation are identified.  There are no signs of joint effusion.   Impression    Degenerative arthrosis of both knees.           Assessment:       Encounter Diagnoses   Name Primary?    Acute pain of right knee Yes    Primary osteoarthritis of both knees     Derangement, knee internal, right           Plan:         Return to activities as tolerated  F/U PRN if symptoms of swelling, catching or locking begin  Note for work

## 2017-05-16 ENCOUNTER — OFFICE VISIT (OUTPATIENT)
Dept: PODIATRY | Facility: CLINIC | Age: 62
End: 2017-05-16
Payer: COMMERCIAL

## 2017-05-16 VITALS
SYSTOLIC BLOOD PRESSURE: 122 MMHG | HEIGHT: 72 IN | DIASTOLIC BLOOD PRESSURE: 77 MMHG | BODY MASS INDEX: 23 KG/M2 | HEART RATE: 58 BPM | WEIGHT: 169.81 LBS

## 2017-05-16 DIAGNOSIS — B35.1 DERMATOPHYTOSIS OF NAIL: ICD-10-CM

## 2017-05-16 DIAGNOSIS — L84 CORN OR CALLUS: ICD-10-CM

## 2017-05-16 DIAGNOSIS — E11.9 COMPREHENSIVE DIABETIC FOOT EXAMINATION, TYPE 2 DM, ENCOUNTER FOR: Primary | ICD-10-CM

## 2017-05-16 PROCEDURE — 99999 PR PBB SHADOW E&M-EST. PATIENT-LVL III: CPT | Mod: PBBFAC,,, | Performed by: PODIATRIST

## 2017-05-16 PROCEDURE — 1160F RVW MEDS BY RX/DR IN RCRD: CPT | Mod: S$GLB,,, | Performed by: PODIATRIST

## 2017-05-16 PROCEDURE — 3074F SYST BP LT 130 MM HG: CPT | Mod: S$GLB,,, | Performed by: PODIATRIST

## 2017-05-16 PROCEDURE — 99213 OFFICE O/P EST LOW 20 MIN: CPT | Mod: S$GLB,,, | Performed by: PODIATRIST

## 2017-05-16 PROCEDURE — 3078F DIAST BP <80 MM HG: CPT | Mod: S$GLB,,, | Performed by: PODIATRIST

## 2017-05-16 PROCEDURE — 3045F PR MOST RECENT HEMOGLOBIN A1C LEVEL 7.0-9.0%: CPT | Mod: S$GLB,,, | Performed by: PODIATRIST

## 2017-05-16 PROCEDURE — 4010F ACE/ARB THERAPY RXD/TAKEN: CPT | Mod: S$GLB,,, | Performed by: PODIATRIST

## 2017-05-16 NOTE — MR AVS SNAPSHOT
[facility] [facility]  [address]  [place] [address]  Phone: [phone]  Fax: [phone]                  Misha Simpson   2017 3:20 PM   Office Visit    Description:  Male : 1955   Provider:  Sheila Mcgrath DPM   Department:  [place] [facility]           Reason for Visit     Diabetic Foot Exam     Diabetes Mellitus                To Do List           Future Appointments        Provider Department Dept Phone    2017 4:30 PM CICI Chamberlain, E [facility] Diabetes Education [phone]    2017 3:20 PM Sheila Mcgrath DPM [facility] [phone]      Goals (5 Years of Data)              Today    17    Blood Pressure < 140/90   122/77  122/77  122/77    HEMOGLOBIN A1C < 7.0             [facility]     [facility] Nurse Care Line -  Assistance  Unless otherwise directed by your provider, please contact [facility], our nurse care line that is available for  assistance.     Registered nurses in the [facility] provide: appointment scheduling, clinical advisement, health education, and other advisory services.  Call: [phone] (toll free)               Medications           Message regarding Medications     Verify the changes and/or additions to your medication regime listed below are the same as discussed with your clinician today.  If any of these changes or additions are incorrect, please notify your healthcare provider.             Verify that the below list of medications is an accurate representation of the medications you are currently taking.  If none reported, the list may be blank. If incorrect, please contact your healthcare provider. Carry this list with you in case of emergency.           Current Medications     amlodipine (NORVASC) 5 MG tablet TAKE 1 TABLET BY MOUTH ONCE DAILY    benazepril (LOTENSIN) 40 MG tablet TAKE 1 TABLET BY MOUTH ONCE DAILY    blood sugar diagnostic (BLOOD GLUCOSE  TEST) Strp Test glucose 3 x daily    dulaglutide (TRULICITY) 0.75 mg/0.5 mL PnIj Inject 0.5 mLs (0.75 mg total) into the skin every 7 days.    hydrochlorothiazide (MICROZIDE) 12.5 mg capsule TAKE ONE CAPSULE BY MOUTH ONCE DAILY    lancets Misc As directed    metformin (GLUCOPHAGE) 1000 MG tablet Take 1 tablet (1,000 mg total) by mouth 2 (two) times daily with meals.    metformin (GLUCOPHAGE) 1000 MG tablet TAKE ONE TABLET BY MOUTH TWICE DAILY WITH MEALS    nateglinide (STARLIX) 120 MG tablet Take 1 tablet (120 mg total) by mouth 2 (two) times daily before meals.    diclofenac sodium (VOLTAREN) 1 % Gel Apply 2 g topically 4 (four) times daily. Cannot take NSAIDs secondary GI upset           Clinical Reference Information           Your Vitals Were     BP Pulse Height Weight BMI    122/77 (BP Location: Left arm, Patient Position: Sitting, BP Method: Automatic) 58 6' (1.829 m) 77 kg (169 lb 12.8 oz) 23.03 kg/m2      Blood Pressure          Most Recent Value    BP  122/77      Allergies as of 5/16/2017     Aspirin      Immunizations Administered on Date of Encounter - 5/16/2017     None      Smoking Cessation     If you would like to quit smoking:   You may be eligible for free services if you are a [place] resident and started smoking cigarettes before September 1, 1988.  Call the Smoking Cessation Trust ([facility]) toll free at ([phone] [phone] or [phone].   Call 1-800-QUIT-NOW if you do not meet the above criteria.   Contact us via email: [email or web address]   View our website for more information: [email or web address]        Language Assistance Services     ATTENTION: Language assistance services are available, free of charge. Please call [phone].      ATENCIÓN: Si habla kale, tiene a higgins disposición servicios gratuitos de asistencia lingüística. Llame al [phone].     CHÚ Ý: N?u b?n nói Ti?ng Vi?t, có các d?ch v? h? tr? ngôn ng? mi?n phí dành cho b?n. G?i s? [phone].          [facility] complies with applicable Federal civil rights laws and does not discriminate on the basis of race, color, national origin, age, disability, or sex.

## 2017-05-17 NOTE — PROGRESS NOTES
Subjective:     Patient ID: Misha Simpson is a 61 y.o. male.    Chief Complaint: Diabetic Foot Exam (Patient states no current unusual pain or problems.) and Diabetes Mellitus (Last PCP visit with ()- 2/27/17. Today's glucose- 124 mg/dL. )    Misha is a 61 y.o. male who presents to the clinic for evaluation and treatment of high risk feet. Misha has a past medical history of Adrenal adenoma (2008); Bronchiectasis (5/15/2012); Cataract; Diabetes mellitus, type 2; Emphysema; Hemoptysis; Hyperlipidemia; Hyperplastic colon polyp; Hypertension; Lung nodules; Lymphocytosis; Peptic ulcer disease; Pneumonia; and Tobacco abuse (5/15/2012). The patient's chief complaint is new callus may be forming on the right foot. This patient has documented high risk feet requiring routine maintenance secondary to diabetes mellitis and those secondary complications of diabetes, as mentioned. Patient states he has been eating better and his blood sugar runs 90-130mg/dl.    PCP: Trino Snyder MD    Date Last Seen by PCP: 2/27/17    Current shoe gear:  Affected Foot: workboots     Unaffected Foot: workboots    Hemoglobin A1C   Date Value Ref Range Status   02/16/2017 8.7 (H) 4.5 - 6.2 % Final     Comment:     According to ADA guidelines, hemoglobin A1C <7.0% represents  optimal control in non-pregnant diabetic patients.  Different  metrics may apply to specific populations.   Standards of Medical Care in Diabetes - 2016.  For the purpose of screening for the presence of diabetes:  <5.7%     Consistent with the absence of diabetes  5.7-6.4%  Consistent with increasing risk for diabetes   (prediabetes)  >or=6.5%  Consistent with diabetes  Currently no consensus exists for use of hemoglobin A1C  for diagnosis of diabetes for children.     07/19/2016 7.9 (H) 4.5 - 6.2 % Final     Comment:     According to ADA guidelines, hemoglobin A1C <7.0% represents  optimal control in non-pregnant diabetic patients.   Different  metrics may apply to specific populations.   Standards of Medical Care in Diabetes - 2016.  For the purpose of screening for the presence of diabetes:  <5.7%     Consistent with the absence of diabetes  5.7-6.4%  Consistent with increasing risk for diabetes   (prediabetes)  >or=6.5%  Consistent with diabetes  Currently no consensus exists for use of hemoglobin A1C  for diagnosis of diabetes for children.     02/25/2016 10.4 (H) 4.5 - 6.2 % Final           Patient Active Problem List   Diagnosis    Emphysema lung    Lung nodules    Adrenal adenoma    Hyperplastic colon polyp    Diabetes mellitus, type 2    Hypertension associated with diabetes    Combined hyperlipidemia associated with type 2 diabetes mellitus    Bronchiectasis    Keratoma    Metatarsalgia    Pneumonia    Smoking    Type 2 diabetes mellitus without complication, without long-term current use of insulin       Medication List with Changes/Refills   Current Medications    AMLODIPINE (NORVASC) 5 MG TABLET    TAKE 1 TABLET BY MOUTH ONCE DAILY    BENAZEPRIL (LOTENSIN) 40 MG TABLET    TAKE 1 TABLET BY MOUTH ONCE DAILY    BLOOD SUGAR DIAGNOSTIC (BLOOD GLUCOSE TEST) STRP    Test glucose 3 x daily    DICLOFENAC SODIUM (VOLTAREN) 1 % GEL    Apply 2 g topically 4 (four) times daily. Cannot take NSAIDs secondary GI upset    DULAGLUTIDE (TRULICITY) 0.75 MG/0.5 ML PNIJ    Inject 0.5 mLs (0.75 mg total) into the skin every 7 days.    HYDROCHLOROTHIAZIDE (MICROZIDE) 12.5 MG CAPSULE    TAKE ONE CAPSULE BY MOUTH ONCE DAILY    LANCETS MISC    As directed    METFORMIN (GLUCOPHAGE) 1000 MG TABLET    Take 1 tablet (1,000 mg total) by mouth 2 (two) times daily with meals.    METFORMIN (GLUCOPHAGE) 1000 MG TABLET    TAKE ONE TABLET BY MOUTH TWICE DAILY WITH MEALS    NATEGLINIDE (STARLIX) 120 MG TABLET    Take 1 tablet (120 mg total) by mouth 2 (two) times daily before meals.       Review of patient's allergies indicates:   Allergen Reactions     Aspirin      Other reaction(s): Stomach upset  Other reaction(s): Nausea       Past Surgical History:   Procedure Laterality Date    APPENDECTOMY      BONE MARROW BIOPSY      COLONOSCOPY  8/6/2013            Family History   Problem Relation Age of Onset    Kidney failure Father     Diabetes Father     Heart disease Mother 60    Diabetes Brother        Social History     Social History    Marital status:      Spouse name: N/A    Number of children: N/A    Years of education: N/A     Occupational History    Not on file.     Social History Main Topics    Smoking status: Current Every Day Smoker     Packs/day: 1.00    Smokeless tobacco: Not on file    Alcohol use 0.6 oz/week     1 Cans of beer per week      Comment: 1 or 2 beers a week     Drug use: No    Sexual activity: Not on file     Other Topics Concern    Not on file     Social History Narrative       Vitals:    05/16/17 1516   BP: 122/77   Pulse: (!) 58   Weight: 77 kg (169 lb 12.8 oz)   Height: 6' (1.829 m)   PainSc: 0-No pain       Hemoglobin A1C   Date Value Ref Range Status   02/16/2017 8.7 (H) 4.5 - 6.2 % Final     Comment:     According to ADA guidelines, hemoglobin A1C <7.0% represents  optimal control in non-pregnant diabetic patients.  Different  metrics may apply to specific populations.   Standards of Medical Care in Diabetes - 2016.  For the purpose of screening for the presence of diabetes:  <5.7%     Consistent with the absence of diabetes  5.7-6.4%  Consistent with increasing risk for diabetes   (prediabetes)  >or=6.5%  Consistent with diabetes  Currently no consensus exists for use of hemoglobin A1C  for diagnosis of diabetes for children.     07/19/2016 7.9 (H) 4.5 - 6.2 % Final     Comment:     According to ADA guidelines, hemoglobin A1C <7.0% represents  optimal control in non-pregnant diabetic patients.  Different  metrics may apply to specific populations.   Standards of Medical Care in Diabetes - 2016.  For the  purpose of screening for the presence of diabetes:  <5.7%     Consistent with the absence of diabetes  5.7-6.4%  Consistent with increasing risk for diabetes   (prediabetes)  >or=6.5%  Consistent with diabetes  Currently no consensus exists for use of hemoglobin A1C  for diagnosis of diabetes for children.     02/25/2016 10.4 (H) 4.5 - 6.2 % Final       Review of Systems   Constitutional: Negative for chills and fever.   Respiratory: Negative for shortness of breath.    Cardiovascular: Negative for chest pain, palpitations, orthopnea, claudication and leg swelling.   Gastrointestinal: Negative for diarrhea, nausea and vomiting.   Musculoskeletal: Negative for joint pain.   Skin: Negative for rash.   Neurological: Positive for tingling and sensory change. Negative for dizziness, focal weakness and weakness.   Psychiatric/Behavioral: Negative.            Objective:      PHYSICAL EXAM: Apperance: Alert and orient in no distress,well developed, and with good attention to grooming and body habits  Patient presents ambulating   Lower Extremity Exam   VASCULAR: Dorsalis pedis pulses 2/4 bilateral and Posterior Tibial pulses 2/4 bilateral. Capillary fill time <4 seconds bilateral. No edema observed bilateral. Varicosities absent bilateral. Skin temperature of the lower extremities is warm to warm, proximal to distal. Hair growth dim bilateral.  DERMATOLOGICAL: No skin rash, subcutaneous nodules, or ulcers observed. Bilateral hallux nails thick and discolored. Nails 2-5 bilateral are normal length and thickness. Webspaces 1-4 bilateral are dry, clean, and intact. Mild hyperkeratotic tissue noted bilateral posterior heel, bilateral medial hallux, and left plantar 3rd submetatarsal.   NEUROLOGICAL: Light touch, sharp-dull, proprioception all present and equal bilaterally.  Vibratory sensation diminished at bilateral hallux. Protective sensation intact at all 10 at sites as tested with a Oscoda-Fausto 5.07 monofilament.    MUSCULOSKELETAL: Muscle strength is 5/5 for foot inverters, everters, plantarflexors, and dorsiflexors. Muscle tone is normal.         Assessment:       Encounter Diagnoses   Name Primary?    Comprehensive diabetic foot examination, type 2 DM, encounter for Yes    Dermatophytosis of nail     Corn or callus          Plan:   Comprehensive diabetic foot examination, type 2 DM, encounter for    Dermatophytosis of nail    Corn or callus    I counseled the patient on his conditions, regarding findings of my examination, my impressions, and usual treatment plan.   Greater than 50% of this visit spent on counseling and coordination of care.  Greater than 20 minutes spent on education about the diabetic foot, neuropathy, and prevention of limb loss.  Shoe inspection. Diabetic Foot Education. Patient reminded of the importance of good nutrition and blood sugar control to help prevent podiatric complications of diabetes. Patient instructed on proper foot hygeine. We discussed wearing proper shoe gear, daily foot inspections, never walking without protective shoe gear, never putting sharp instruments to feet.    Patient  will continue to monitor the areas daily, inspect feet, wear protective shoe gear when ambulatory, moisturizer to maintain skin integrity. Patient reminded of the importance of good nutrition and blood sugar control to help prevent podiatric complications of diabetes.  Patient to return 6 months or sooner if needed.                 Sheila Mcgrath DPM  [facility]

## 2017-07-06 ENCOUNTER — OFFICE VISIT (OUTPATIENT)
Dept: DIABETES | Facility: CLINIC | Age: 62
End: 2017-07-06
Payer: COMMERCIAL

## 2017-07-06 ENCOUNTER — LAB VISIT (OUTPATIENT)
Dept: LAB | Facility: HOSPITAL | Age: 62
End: 2017-07-06
Attending: NURSE PRACTITIONER
Payer: COMMERCIAL

## 2017-07-06 VITALS
WEIGHT: 171.38 LBS | DIASTOLIC BLOOD PRESSURE: 78 MMHG | HEIGHT: 72 IN | BODY MASS INDEX: 23.21 KG/M2 | SYSTOLIC BLOOD PRESSURE: 114 MMHG

## 2017-07-06 DIAGNOSIS — E11.9 TYPE 2 DIABETES MELLITUS WITHOUT COMPLICATION, WITHOUT LONG-TERM CURRENT USE OF INSULIN: ICD-10-CM

## 2017-07-06 DIAGNOSIS — E11.9 TYPE 2 DIABETES MELLITUS WITHOUT COMPLICATION, WITHOUT LONG-TERM CURRENT USE OF INSULIN: Primary | ICD-10-CM

## 2017-07-06 DIAGNOSIS — E11.59 HYPERTENSION ASSOCIATED WITH DIABETES: ICD-10-CM

## 2017-07-06 DIAGNOSIS — I15.2 HYPERTENSION ASSOCIATED WITH DIABETES: ICD-10-CM

## 2017-07-06 DIAGNOSIS — F17.200 SMOKING: ICD-10-CM

## 2017-07-06 LAB
ALBUMIN SERPL BCP-MCNC: 4.1 G/DL
ALP SERPL-CCNC: 78 U/L
ALT SERPL W/O P-5'-P-CCNC: 18 U/L
ANION GAP SERPL CALC-SCNC: 9 MMOL/L
AST SERPL-CCNC: 40 U/L
BILIRUB SERPL-MCNC: 0.7 MG/DL
BUN SERPL-MCNC: 13 MG/DL
CALCIUM SERPL-MCNC: 9.2 MG/DL
CHLORIDE SERPL-SCNC: 104 MMOL/L
CO2 SERPL-SCNC: 25 MMOL/L
CREAT SERPL-MCNC: 1.1 MG/DL
EST. GFR  (AFRICAN AMERICAN): >60 ML/MIN/1.73 M^2
EST. GFR  (NON AFRICAN AMERICAN): >60 ML/MIN/1.73 M^2
GLUCOSE SERPL-MCNC: 87 MG/DL
POTASSIUM SERPL-SCNC: 3.8 MMOL/L
PROT SERPL-MCNC: 7.8 G/DL
SODIUM SERPL-SCNC: 138 MMOL/L

## 2017-07-06 PROCEDURE — 36415 COLL VENOUS BLD VENIPUNCTURE: CPT | Mod: PO

## 2017-07-06 PROCEDURE — 99214 OFFICE O/P EST MOD 30 MIN: CPT | Mod: S$GLB,,, | Performed by: NURSE PRACTITIONER

## 2017-07-06 PROCEDURE — 80053 COMPREHEN METABOLIC PANEL: CPT

## 2017-07-06 PROCEDURE — 4010F ACE/ARB THERAPY RXD/TAKEN: CPT | Mod: S$GLB,,, | Performed by: NURSE PRACTITIONER

## 2017-07-06 PROCEDURE — 3044F HG A1C LEVEL LT 7.0%: CPT | Mod: S$GLB,,, | Performed by: NURSE PRACTITIONER

## 2017-07-06 PROCEDURE — 99999 PR PBB SHADOW E&M-EST. PATIENT-LVL III: CPT | Mod: PBBFAC,,, | Performed by: NURSE PRACTITIONER

## 2017-07-06 PROCEDURE — 83036 HEMOGLOBIN GLYCOSYLATED A1C: CPT

## 2017-07-06 NOTE — PROGRESS NOTES
"PCP: Trino Snyder MD    Subjective:     Chief Complaint: Diabetes Follow Up    HISTORY OF PRESENT ILLNESS: 62 year old  male presenting, with wife, for follow up on diabetes.  Patient has had Type II diabetes for several years without complications. He has attended the first diabetes education class, but missed the second class.  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  He has lost 7 pounds since last visit.  Blood glucose testing is performed sporadically.  No meter today.  Per recall, random blood sugars are 80 s - 132.     He denies any recent hospital admissions, ER visits, hypoglycemia, syncope, or diaphoresis.   Also, denies polyuria or polydipsia, no unusual visual symptoms. He had one episode of hypo, BG 60, which he treated with crackers.    Height: 5' 11.5" (181.6 cm)  //  Weight: 77.7 kg (171 lb 6.4 oz), Body mass index is 23.57 kg/m².  Home Blood Glucose reading this AM: Not Taken  Blood Glucose in clinic: 91 mg/dl at 4:16 pm    DM MEDICATIONS:   Metformin 1,000 mg BID  Trulicity 0.75 mg weekly    Lab Results   Component Value Date    HGBA1C 6.4 (H) 07/06/2017     ADA recommends less than 7.0.  Other labs reviewed.     REVIEW OF SYSTEMS:  General: Denies fever, nausea, vomiting, chills, or change in appetite.  HEENT: Denies impaired hearing, dysphagia.  Respiratory: Denies shortness of breath, cough, or wheezing.  Cardiovascular: Denies chest pain, palpitations.  GI: Denies hematochezia.  : Denies hematuria, diarrhea, dysuria or frequency.  MS:  Normal gait. Denies difficulty with mobility, muscle or joint pain.  SKIN: Denies rashes and lesions.  Neuro: Denies numbness or tingling in the hands or feet.   PSYCH: Denies depression or anxiety. No suicidal ideations.  ENDO: See HPI.    STANDARDS OF CARE:  Eye doctor: [facility], Last exam July 2016.    Dental exam: Recommend regular exams; denies gums bleeding.  Podiatry doctor: No podiatrist.     ACTIVITY LEVEL: No " routine exercise.  MEAL PLANNING: Number of meals per day - 3. Breakfast can be sausage, hodgson, and homemade biscuit.  Lunch, usually skips, or peas, corn, baked chicken. Dinner can be lima beans with ham, sausage, OR roast, a little rice.  Number of snacks per day - 2.  Patient is encouraged to consume 45 - 60 grams of carbohydrates in each meal and 1800 calorie per day.   Per dietary recall, patient is not limiting carbohydrates, saturated fats and sodium.     BLOOD GLUCOSE TESTING: Self-monitoring   SOCIAL HISTORY: .  Lives with spouse. Works as a canal . Smoking, 1 pack per day.  He enjoys fishing as a hobby.     Objective:     PHYSICAL EXAMINATION:  GENERAL: WDWN  male in no acute distress, ambulatory, responds appropriately. AAO X 3.   NECK: Supple, no thyromegaly, no cervical or supraclavicular lymphadenopathy, no carotid bruit.  HEART: Regular rate and rhythm. No rubs, murmurs or gallops.  LUNGS: CTA bilaterally. Unlabored breathing, no use of accessory muscles.  MUSCULOSKELETAL: Normal gait and muscle strength.  ABDOMEN: Active bowel sounds X 4, no masses or tenderness.   SKIN: Warm, dry skin. No lesions or abrasions. Clean, dry well-healed injection sites.   NEUROLOGIC: Cranial nerves II-XII grossly intact.   FOOT EXAMINATION: Protective Sensation (w/ 10 gram monofilament):  Right: IntactLeft: Intact  // Visual Inspection: Normal -  Bilateral  //  Pedal Pulses:  Right: Present Left: Present    Assessment:     EDUCATIONAL ASSESSMENT:  1. Impediments in learning class environment - NONE.  2. Needs improvement in self-care management skills.    1) Diabetes Type II - improving control on Trulicity, metformin,  A1C 8.7  2) Hypertension - controlled Norvasc, Lotensin, HCT  3) Tobacco Abuse    Plan:     1.) Patient was instructed to monitor blood glucose 2 - 3 x daily, fasting and ac dinner or at bedtime. Discussed ADA goal for fasting blood sugar, 80 - 130 mg/dL; pp blood sugars  below 180 mg/dl. Also, discussed prevention of hypoglycemia and the need to adjust goals to higher levels if persistent hypoglycemia.  Reminded to bring BG records or meter to each visit for review.  2.) Reviewed pathophysiology of Type II diabetes, complications related to the disease, importance of annual dilated eye exam and daily foot examination.  3.) We discussed the ADA recommendations: Hemoglobin A1c below 7.0 %.  All patients with diabetes should be on statins unless contraindicated.  ACE or ARB therapy if not contraindicated.   4.) Continue Trulicity 0.75 mg weekly.  Continue metformin 1,000 mg BID.  This regimen appears to be controlling blood sugars so will continue for now.  I explained to patient that he should keep snacks with him while working  just in case he experiences a low blood sugar.  He voiced understanding.  5.) Meal planning teaching: Carbohydrate definition - one serving is 15 gms. Carbohydrate spacing - carbohydrates should be spaced into approximately 3 meals with 2 snacks ( of one carbohydrate ) between meals or at bedtime. Increase vegetable intake to 2 or more cups of vegetables per day as well as 2 fruit servings. Recommended low saturated fat, low sodium diet to aid in control of hypertension and cholesterol.  6.) Discussed activity, benefits, methods, and precautions. Recommended patient start or continue some form of exercise and increase as tolerated to 30 minutes per day to facilitate weight loss and aid in control of BGs.  7.) Return to clinic in 3 months for follow up.  He was explained the above plan and given opportunity to ask questions. Advised to call clinic with any further questions or concerns.    Marion Mart, NP-C, CDE

## 2017-07-07 LAB
ESTIMATED AVG GLUCOSE: 137 MG/DL
HBA1C MFR BLD HPLC: 6.4 %

## 2017-08-03 RX ORDER — AMLODIPINE BESYLATE 5 MG/1
TABLET ORAL
Qty: 90 TABLET | Refills: 0 | Status: SHIPPED | OUTPATIENT
Start: 2017-08-03 | End: 2017-11-17 | Stop reason: SDUPTHER

## 2017-08-03 RX ORDER — HYDROCHLOROTHIAZIDE 12.5 MG/1
CAPSULE ORAL
Qty: 90 CAPSULE | Refills: 0 | Status: SHIPPED | OUTPATIENT
Start: 2017-08-03 | End: 2017-08-21

## 2017-08-03 RX ORDER — BENAZEPRIL HYDROCHLORIDE 40 MG/1
TABLET ORAL
Qty: 90 TABLET | Refills: 0 | Status: SHIPPED | OUTPATIENT
Start: 2017-08-03 | End: 2017-11-17 | Stop reason: SDUPTHER

## 2017-08-09 ENCOUNTER — HOSPITAL ENCOUNTER (OUTPATIENT)
Dept: RADIOLOGY | Facility: HOSPITAL | Age: 62
Discharge: HOME | End: 2017-08-09
Attending: NURSE PRACTITIONER
Payer: COMMERCIAL

## 2017-08-09 ENCOUNTER — OFFICE VISIT (OUTPATIENT)
Dept: FAMILY MEDICINE | Facility: CLINIC | Age: 62
End: 2017-08-09
Payer: COMMERCIAL

## 2017-08-09 VITALS
WEIGHT: 167.88 LBS | BODY MASS INDEX: 23.5 KG/M2 | TEMPERATURE: 98 F | HEART RATE: 55 BPM | HEIGHT: 71 IN | DIASTOLIC BLOOD PRESSURE: 70 MMHG | SYSTOLIC BLOOD PRESSURE: 124 MMHG

## 2017-08-09 DIAGNOSIS — Z13.6 ENCOUNTER FOR ABDOMINAL AORTIC ANEURYSM (AAA) SCREENING: ICD-10-CM

## 2017-08-09 DIAGNOSIS — H65.91 MIDDLE EAR EFFUSION, RIGHT: ICD-10-CM

## 2017-08-09 DIAGNOSIS — R05.9 COUGH: ICD-10-CM

## 2017-08-09 DIAGNOSIS — F17.200 SMOKER: ICD-10-CM

## 2017-08-09 DIAGNOSIS — R42 DIZZINESS: Primary | ICD-10-CM

## 2017-08-09 DIAGNOSIS — R00.2 PALPITATIONS: ICD-10-CM

## 2017-08-09 PROCEDURE — 71020 XR CHEST PA AND LATERAL: CPT | Mod: TC,PO

## 2017-08-09 PROCEDURE — 99999 PR PBB SHADOW E&M-EST. PATIENT-LVL IV: CPT | Mod: PBBFAC,,, | Performed by: NURSE PRACTITIONER

## 2017-08-09 PROCEDURE — 71020 XR CHEST PA AND LATERAL: CPT | Mod: 26,,, | Performed by: RADIOLOGY

## 2017-08-09 PROCEDURE — 3008F BODY MASS INDEX DOCD: CPT | Mod: S$GLB,,, | Performed by: NURSE PRACTITIONER

## 2017-08-09 PROCEDURE — 3074F SYST BP LT 130 MM HG: CPT | Mod: S$GLB,,, | Performed by: NURSE PRACTITIONER

## 2017-08-09 PROCEDURE — 93005 ELECTROCARDIOGRAM TRACING: CPT | Mod: S$GLB,,, | Performed by: NURSE PRACTITIONER

## 2017-08-09 PROCEDURE — 3078F DIAST BP <80 MM HG: CPT | Mod: S$GLB,,, | Performed by: NURSE PRACTITIONER

## 2017-08-09 PROCEDURE — 93010 ELECTROCARDIOGRAM REPORT: CPT | Mod: S$GLB,,, | Performed by: NUCLEAR MEDICINE

## 2017-08-09 PROCEDURE — 99213 OFFICE O/P EST LOW 20 MIN: CPT | Mod: S$GLB,,, | Performed by: NURSE PRACTITIONER

## 2017-08-09 RX ORDER — MECLIZINE HYDROCHLORIDE 25 MG/1
25 TABLET ORAL 2 TIMES DAILY PRN
Qty: 20 TABLET | Refills: 0 | Status: SHIPPED | OUTPATIENT
Start: 2017-08-09 | End: 2017-08-21 | Stop reason: ALTCHOICE

## 2017-08-09 NOTE — PROGRESS NOTES
"Subjective:       Patient ID: Misha Simpson is a 62 y.o. male.    Chief Complaint: Dizziness    Dizziness:   Chronicity:  New (when rising from lying and with "coughing spells")  Onset:  1 to 4 weeks ago  Progression since onset:  Unchanged  Frequency:  Every few days  Severity:  Mild  Dizziness characteristics:  Lightheaded/impending faint and sensation of movement   Associated symptoms: light-headedness and palpitations (States can sometimes "see" in abdominal area).no hearing loss, no ear congestion, no ear pain, no fever, no headaches, no tinnitus, no nausea, no vomiting, no diaphoresis, no aural fullness, no weakness, no visual disturbances, no syncope, no panic, no facial weakness, no slurred speech, no numbness in extremities and no chest pain.  Aggravated by:  Getting up ("coughing spells")  Treatments tried:  Nothing  Improvements on treatment:  No reliefno strokes, no cardiac surgery, no neurologic disease, no head trauma, no balance testing, no ear trauma, no ear surgery, no head trauma, no ear infections, no anxiety, no ear tubes, no environmental allergies, no MRI head and no CT head.   CMP 7/2017 unremarkable; states BGs controlled; hgb a1c 6.4  Pt is also a smoker; has emphysema. Does not use inhalers and continues to smoke. Declines cessation.  Past Medical History:   Diagnosis Date    Adrenal adenoma 2008    bx benign    Bronchiectasis 5/15/2012    Cataract     Diabetes mellitus, type 2     Emphysema     Hemoptysis     Hyperlipidemia     Hyperplastic colon polyp     repeat recommended 4/2013    Hypertension     Lung nodules     small stable on serial ct    Lymphocytosis     reactive    Peptic ulcer disease     Pneumonia     Tobacco abuse 5/15/2012     Social History     Social History    Marital status:      Spouse name: N/A    Number of children: N/A    Years of education: N/A     Occupational History         Social History Main Topics    Smoking status: Current Every Day " "Smoker     Packs/day: 1.00    Smokeless tobacco: Not on file    Alcohol use 0.6 oz/week     1 Cans of beer per week      Comment: 1 or 2 beers a week     Drug use: No    Sexual activity: Not on file     Review of Systems   Constitutional: Negative.  Negative for diaphoresis and fever.   HENT: Negative for ear pain, hearing loss and tinnitus.    Eyes: Negative.    Respiratory: Negative.    Cardiovascular: Positive for palpitations (States can sometimes "see" in abdominal area). Negative for chest pain and syncope.   Gastrointestinal: Negative.  Negative for nausea and vomiting.   Endocrine: Negative.    Genitourinary: Negative.    Musculoskeletal: Negative.    Skin: Negative.    Allergic/Immunologic: Negative.  Negative for environmental allergies.   Neurological: Positive for dizziness and light-headedness. Negative for weakness and headaches.   Psychiatric/Behavioral: Negative.        Objective:      Physical Exam   Constitutional: He is oriented to person, place, and time. He appears well-developed and well-nourished.   HENT:   Head: Normocephalic.   Right Ear: External ear normal.   Left Ear: External ear normal.   Nose: Nose normal.   Mouth/Throat: Oropharynx is clear and moist.   Eyes: Conjunctivae are normal. Pupils are equal, round, and reactive to light.   Neck: Normal range of motion. Neck supple.   Cardiovascular: Normal rate, regular rhythm and normal heart sounds.    Pulmonary/Chest: Effort normal and breath sounds normal.   Abdominal: Soft. Bowel sounds are normal.   Musculoskeletal: Normal range of motion.   Neurological: He is alert and oriented to person, place, and time.   Skin: Skin is warm and dry. Capillary refill takes 2 to 3 seconds.   Psychiatric: He has a normal mood and affect. His behavior is normal. Judgment and thought content normal.   Nursing note and vitals reviewed.      Assessment:       1. Dizziness    2. Cough    3. Palpitations    4. Middle ear effusion, right    5. Smoker  "   6.      Encounter for abdominal aortic aneurysm (AAA) screening     Plan:       Misha was seen today for dizziness.    Diagnoses and all orders for this visit:    Dizziness  Comments:  When sit up  Orders:  -     US Abdominal Aorta; Future  -     CBC auto differential; Future  -     US Carotid Bilateral; Future        -     meclizine (ANTIVERT) 25 mg tablet; Take 1 tablet (25 mg total) by mouth 2 (two) times daily as needed for Dizziness.    Cough  Comments:  Causes lightheadedness  Orders:  -     X-Ray Chest PA And Lateral; Future    Palpitations  -     IN OFFICE EKG 12-LEAD (to Muse)  -     Holter monitor - 24 hour; Future    Middle ear effusion, right  -     meclizine (ANTIVERT) 25 mg tablet; Take 1 tablet (25 mg total) by mouth 2 (two) times daily as needed for Dizziness.    Smoker  -     X-Ray Chest PA And Lateral; Future    Encounter for abdominal aortic aneurysm (AAA) screening  -     US Abdominal Aorta; Future    Report to ER immediately if symptoms worsen

## 2017-08-14 ENCOUNTER — TELEPHONE (OUTPATIENT)
Dept: RADIOLOGY | Facility: HOSPITAL | Age: 62
End: 2017-08-14

## 2017-08-15 ENCOUNTER — HOSPITAL ENCOUNTER (OUTPATIENT)
Dept: RADIOLOGY | Facility: HOSPITAL | Age: 62
Discharge: HOME | End: 2017-08-15
Attending: NURSE PRACTITIONER
Payer: COMMERCIAL

## 2017-08-15 DIAGNOSIS — R42 DIZZINESS: ICD-10-CM

## 2017-08-15 PROCEDURE — 76775 US EXAM ABDO BACK WALL LIM: CPT | Mod: TC,PO

## 2017-08-15 PROCEDURE — 76775 US EXAM ABDO BACK WALL LIM: CPT | Mod: 26,,, | Performed by: RADIOLOGY

## 2017-08-15 PROCEDURE — 93880 EXTRACRANIAL BILAT STUDY: CPT | Mod: TC,PO

## 2017-08-15 PROCEDURE — 93880 EXTRACRANIAL BILAT STUDY: CPT | Mod: 26,,, | Performed by: RADIOLOGY

## 2017-08-17 ENCOUNTER — TELEPHONE (OUTPATIENT)
Dept: FAMILY MEDICINE | Facility: CLINIC | Age: 62
End: 2017-08-17

## 2017-08-17 NOTE — LETTER
August 17, 2017      [facility]  [address] [address]  Phone: [phone]  Fax: [phone]       Patient: Misha Simpson   YOB: 1955  Date of Visit: 08/17/2017    To Whom It May Concern:    Janet Simpson  was at [facility] on 08/15/2017 He may return to work/school on 08/16/17, no restrictions. If you have any questions or concerns, or if I can be of further assistance, please do not hesitate to contact me.    Sincerely,    Francoise Mata LPN

## 2017-08-17 NOTE — LETTER
August 21, 2017      [facility]  [address] [address]  Phone: [phone]  Fax: [phone]       Patient: Misha Simpson   YOB: 1955  Date of Visit: 08/21/2017    To Whom It May Concern:    Janet Simpson  was at [facility] on 08/21/2017. He may return to work/school on 08/22/17, no restrictions. If you have any questions or concerns, or if I can be of further assistance, please do not hesitate to contact me.    Sincerely,    Francoise Mata LPN

## 2017-08-17 NOTE — TELEPHONE ENCOUNTER
----- Message from Tonya Tran sent at 8/17/2017 10:02 AM CDT -----  Contact: Patients wife, Sara andres ids requesting her husbands test results, please call patient back at [phone]. Thank you

## 2017-08-21 ENCOUNTER — OFFICE VISIT (OUTPATIENT)
Dept: FAMILY MEDICINE | Facility: CLINIC | Age: 62
End: 2017-08-21
Payer: COMMERCIAL

## 2017-08-21 ENCOUNTER — CLINICAL SUPPORT (OUTPATIENT)
Dept: FAMILY MEDICINE | Facility: CLINIC | Age: 62
End: 2017-08-21

## 2017-08-21 VITALS
HEIGHT: 71 IN | HEART RATE: 76 BPM | BODY MASS INDEX: 23.83 KG/M2 | WEIGHT: 170.19 LBS | DIASTOLIC BLOOD PRESSURE: 83 MMHG | TEMPERATURE: 98 F | SYSTOLIC BLOOD PRESSURE: 113 MMHG

## 2017-08-21 DIAGNOSIS — Z02.83 EMPLOYMENT-RELATED DRUG TESTING, ENCOUNTER FOR: Primary | ICD-10-CM

## 2017-08-21 DIAGNOSIS — R42 DIZZINESS: ICD-10-CM

## 2017-08-21 DIAGNOSIS — H93.8X3 EAR FULLNESS, BILATERAL: Primary | ICD-10-CM

## 2017-08-21 PROCEDURE — 99213 OFFICE O/P EST LOW 20 MIN: CPT | Mod: S$GLB,,, | Performed by: NURSE PRACTITIONER

## 2017-08-21 PROCEDURE — 99999 PR PBB SHADOW E&M-EST. PATIENT-LVL III: CPT | Mod: PBBFAC,,, | Performed by: NURSE PRACTITIONER

## 2017-08-21 PROCEDURE — 3008F BODY MASS INDEX DOCD: CPT | Mod: S$GLB,,, | Performed by: NURSE PRACTITIONER

## 2017-08-21 PROCEDURE — 3079F DIAST BP 80-89 MM HG: CPT | Mod: S$GLB,,, | Performed by: NURSE PRACTITIONER

## 2017-08-21 PROCEDURE — 99202 OFFICE O/P NEW SF 15 MIN: CPT | Mod: ,,, | Performed by: FAMILY MEDICINE

## 2017-08-21 PROCEDURE — 3074F SYST BP LT 130 MM HG: CPT | Mod: S$GLB,,, | Performed by: NURSE PRACTITIONER

## 2017-08-21 RX ORDER — PROMETHAZINE HYDROCHLORIDE 25 MG/1
25 TABLET ORAL 3 TIMES DAILY PRN
Qty: 30 TABLET | Refills: 0 | Status: SHIPPED | OUTPATIENT
Start: 2017-08-21 | End: 2017-08-31

## 2017-08-21 NOTE — PROGRESS NOTES
Subjective:       Patient ID: Misha Simpson is a 62 y.o. male.    Chief Complaint: Dizziness and Ear Fullness    Ear Fullness    There is pain in both ears. This is a new problem. The current episode started 1 to 4 weeks ago. The problem occurs constantly. The problem has been unchanged. The patient is experiencing no pain. Pertinent negatives include no abdominal pain, coughing, diarrhea, ear discharge, headaches, hearing loss, neck pain, rash, rhinorrhea, sore throat or vomiting. Associated symptoms comments: dizziness. He has tried nothing for the symptoms. The treatment provided no relief. There is no history of a chronic ear infection, hearing loss or a tympanostomy tube.     Past Medical History:   Diagnosis Date    Adrenal adenoma 2008    bx benign    Bronchiectasis 5/15/2012    Cataract     Diabetes mellitus, type 2     Emphysema     Hemoptysis     Hyperlipidemia     Hyperplastic colon polyp     repeat recommended 4/2013    Hypertension     Lung nodules     small stable on serial ct    Lymphocytosis     reactive    Peptic ulcer disease     Pneumonia     Tobacco abuse 5/15/2012     Social History     Social History    Marital status:      Spouse name: N/A    Number of children: N/A    Years of education: N/A     Occupational History         Social History Main Topics    Smoking status: Current Every Day Smoker     Packs/day: 1.00    Smokeless tobacco: Not on file    Alcohol use 0.6 oz/week     1 Cans of beer per week      Comment: 1 or 2 beers a week     Drug use: No    Sexual activity: Not on file     Past Surgical History:   Procedure Laterality Date    APPENDECTOMY      BONE MARROW BIOPSY      COLONOSCOPY  8/6/2013            Review of Systems   Constitutional: Negative.    HENT: Negative.  Negative for ear discharge, hearing loss, rhinorrhea and sore throat.         Ear fullness   Eyes: Negative.    Respiratory: Negative.  Negative for cough.    Cardiovascular: Negative.     Gastrointestinal: Negative.  Negative for abdominal pain, diarrhea and vomiting.   Endocrine: Negative.    Genitourinary: Negative.    Musculoskeletal: Negative.  Negative for neck pain.   Skin: Negative.  Negative for rash.   Allergic/Immunologic: Negative.    Neurological: Positive for dizziness. Negative for headaches.   Psychiatric/Behavioral: Negative.        Objective:      Physical Exam   Constitutional: He appears well-developed and well-nourished.   HENT:   Head: Normocephalic.   Right Ear: Hearing, tympanic membrane, external ear and ear canal normal.   Left Ear: Hearing, tympanic membrane, external ear and ear canal normal.   Nose: Nose normal.   Mouth/Throat: Uvula is midline, oropharynx is clear and moist and mucous membranes are normal.   Eyes: Conjunctivae are normal. Pupils are equal, round, and reactive to light.   Neck: Normal range of motion. Neck supple.   Cardiovascular: Normal rate, regular rhythm and normal heart sounds.    Pulmonary/Chest: Effort normal and breath sounds normal.   Abdominal: Soft. Bowel sounds are normal.   Musculoskeletal: Normal range of motion.   Neurological: He is alert.   Skin: Skin is warm and dry. Capillary refill takes 2 to 3 seconds.   Psychiatric: He has a normal mood and affect. His behavior is normal. Judgment and thought content normal.   Nursing note and vitals reviewed.      Assessment:       1. Ear fullness, bilateral    2. Dizziness        Plan:           Misha was seen today for dizziness and ear fullness.    Diagnoses and all orders for this visit:    Ear fullness, bilateral  Dizziness  -     Ambulatory referral to ENT  -     promethazine (PHENERGAN) 25 MG tablet; Take 1 tablet (25 mg total) by mouth 3 (three) times daily as needed.        Report to ER immediately if symptoms worsen

## 2017-08-21 NOTE — ADDENDUM NOTE
Encounter addended by: Francoise Mata LPN on: 8/21/2017  1:31 PM<BR>    Actions taken: Letter status changed

## 2017-09-07 ENCOUNTER — OFFICE VISIT (OUTPATIENT)
Dept: DIABETES | Facility: CLINIC | Age: 62
End: 2017-09-07
Payer: COMMERCIAL

## 2017-09-07 ENCOUNTER — LAB VISIT (OUTPATIENT)
Dept: LAB | Facility: HOSPITAL | Age: 62
End: 2017-09-07
Attending: NURSE PRACTITIONER
Payer: COMMERCIAL

## 2017-09-07 VITALS
SYSTOLIC BLOOD PRESSURE: 108 MMHG | BODY MASS INDEX: 23.59 KG/M2 | HEIGHT: 72 IN | WEIGHT: 174.19 LBS | DIASTOLIC BLOOD PRESSURE: 72 MMHG

## 2017-09-07 DIAGNOSIS — E11.59 HYPERTENSION ASSOCIATED WITH DIABETES: ICD-10-CM

## 2017-09-07 DIAGNOSIS — I15.2 HYPERTENSION ASSOCIATED WITH DIABETES: ICD-10-CM

## 2017-09-07 DIAGNOSIS — E78.2 COMBINED HYPERLIPIDEMIA ASSOCIATED WITH TYPE 2 DIABETES MELLITUS: ICD-10-CM

## 2017-09-07 DIAGNOSIS — E11.9 TYPE 2 DIABETES MELLITUS WITHOUT COMPLICATION, WITHOUT LONG-TERM CURRENT USE OF INSULIN: ICD-10-CM

## 2017-09-07 DIAGNOSIS — E11.9 TYPE 2 DIABETES MELLITUS WITHOUT COMPLICATION, WITHOUT LONG-TERM CURRENT USE OF INSULIN: Primary | ICD-10-CM

## 2017-09-07 DIAGNOSIS — E11.69 COMBINED HYPERLIPIDEMIA ASSOCIATED WITH TYPE 2 DIABETES MELLITUS: ICD-10-CM

## 2017-09-07 LAB — GLUCOSE SERPL-MCNC: 183 MG/DL (ref 70–110)

## 2017-09-07 PROCEDURE — 36415 COLL VENOUS BLD VENIPUNCTURE: CPT | Mod: PO

## 2017-09-07 PROCEDURE — 3044F HG A1C LEVEL LT 7.0%: CPT | Mod: S$GLB,,, | Performed by: NURSE PRACTITIONER

## 2017-09-07 PROCEDURE — 4010F ACE/ARB THERAPY RXD/TAKEN: CPT | Mod: S$GLB,,, | Performed by: NURSE PRACTITIONER

## 2017-09-07 PROCEDURE — 3008F BODY MASS INDEX DOCD: CPT | Mod: S$GLB,,, | Performed by: NURSE PRACTITIONER

## 2017-09-07 PROCEDURE — 99214 OFFICE O/P EST MOD 30 MIN: CPT | Mod: S$GLB,,, | Performed by: NURSE PRACTITIONER

## 2017-09-07 PROCEDURE — 3074F SYST BP LT 130 MM HG: CPT | Mod: S$GLB,,, | Performed by: NURSE PRACTITIONER

## 2017-09-07 PROCEDURE — 99999 PR PBB SHADOW E&M-EST. PATIENT-LVL III: CPT | Mod: PBBFAC,,, | Performed by: NURSE PRACTITIONER

## 2017-09-07 PROCEDURE — 83036 HEMOGLOBIN GLYCOSYLATED A1C: CPT

## 2017-09-07 PROCEDURE — 3078F DIAST BP <80 MM HG: CPT | Mod: S$GLB,,, | Performed by: NURSE PRACTITIONER

## 2017-09-07 PROCEDURE — 82948 REAGENT STRIP/BLOOD GLUCOSE: CPT | Mod: S$GLB,,, | Performed by: NURSE PRACTITIONER

## 2017-09-07 NOTE — PROGRESS NOTES
PCP: Trino Snyder MD    Subjective:     Chief Complaint: Diabetes Follow Up    HISTORY OF PRESENT ILLNESS: 62 year old  male presenting for follow up on diabetes.  Patient has had Type II diabetes for several years without complications. He has attended the first diabetes education class, but missed the second class.  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  He has gained 3 pounds since last visit.  Blood glucose testing is performed sporadically.  No meter today.  Per recall, random blood sugars are 74 s - 135.      Of note, he stopped taking Trulicity 3 months ago because he felt like he did not need to take it anymore.    He denies any recent hospital admissions, ER visits, hypoglycemia, syncope, or diaphoresis.  Also, denies polyuria or polydipsia, no unusual visual symptoms.  He had one episode of hypo, BG 60, which he treated with crackers.    Height: 6' (182.9 cm)  //  Weight: 79 kg (174 lb 3.2 oz), Body mass index is 23.63 kg/m².  Home Blood Glucose reading this AM: Not Taken  Blood Glucose in clinic: 183 mg/dl at 4:14 pm    DM MEDICATIONS:   Metformin 1,000 mg BID  Trulicity 0.75 mg weekly ( not taking )    Lab Results   Component Value Date    HGBA1C 6.4 (H) 07/06/2017     ADA recommends less than 7.0.  Other labs reviewed.     REVIEW OF SYSTEMS:  General: Denies fever, nausea, vomiting, chills, or change in appetite.  HEENT: Denies impaired hearing, dysphagia.  Respiratory: Denies shortness of breath, cough, or wheezing.  Cardiovascular: Denies chest pain, palpitations.  GI: Denies hematochezia.  : Denies hematuria, diarrhea, dysuria or frequency.  MS:  Normal gait. Denies difficulty with mobility, muscle or joint pain.  SKIN: Denies rashes and lesions.  Neuro: Denies numbness or tingling in the hands or feet.   PSYCH: Denies depression or anxiety. No suicidal ideations.  ENDO: See HPI.    STANDARDS OF CARE:  Eye doctor: [facility], in [place], Last exam  March 2017  Dental exam: Recommend regular exams; denies gums bleeding.  Podiatry doctor: No podiatrist.     ACTIVITY LEVEL: No routine exercise.  MEAL PLANNING: Number of meals per day - 3. Breakfast can be sausage, hodgson, and homemade biscuit.  Lunch, usually skips, or peas, corn, baked chicken. Dinner can be lima beans with ham, sausage, OR roast, a little rice.  Number of snacks per day - 2.  Patient is encouraged to consume 45 - 60 grams of carbohydrates in each meal and 1800 calorie per day.   Per dietary recall, patient is not limiting carbohydrates, saturated fats and sodium.     BLOOD GLUCOSE TESTING: Self-monitoring   SOCIAL HISTORY: .  Lives with spouse.  Works as a canal . Smoking, 1 pack per day.  He enjoys fishing as a hobby.     Objective:     PHYSICAL EXAMINATION:  GENERAL: WDWN  male in no acute distress, ambulatory, responds appropriately. AAO X 3.   NECK: Supple, no thyromegaly, no cervical or supraclavicular lymphadenopathy, no carotid bruit.  HEART: Regular rate and rhythm. No rubs, murmurs or gallops.  LUNGS: CTA bilaterally. Unlabored breathing, no use of accessory muscles.  MUSCULOSKELETAL: Normal gait and muscle strength.  ABDOMEN: Active bowel sounds X 4, no masses or tenderness.   SKIN: Warm, dry skin. No lesions or abrasions. Clean, dry well-healed injection sites.   NEUROLOGIC: Cranial nerves II-XII grossly intact.   FOOT EXAMINATION: Protective Sensation (w/ 10 gram monofilament):  Right: Intact Left: Intact  // Visual Inspection: Normal -  Bilateral  //  Pedal Pulses:  Right: Present Left: Present    Assessment:     EDUCATIONAL ASSESSMENT:  1. Impediments in learning class environment - NONE.  2. Needs improvement in self-care management skills.    1) Diabetes Type II - improving control on Trulicity ( not taking ), metformin,  A1C 6.4  2) Hypertension - controlled Norvasc, Lotensin, HCT  3) Tobacco Abuse    Plan:     1.) Patient was instructed to monitor  blood glucose 2 - 3 x daily, fasting and ac dinner or at bedtime. Discussed ADA goal for fasting blood sugar, 80 - 130 mg/dL; pp blood sugars below 180 mg/dl. Also, discussed prevention of hypoglycemia and the need to adjust goals to higher levels if persistent hypoglycemia.  Reminded to bring BG records or meter to each visit for review.  2.) Reviewed pathophysiology of Type II diabetes, complications related to the disease, importance of annual dilated eye exam and daily foot examination.  3.) We discussed the ADA recommendations: Hemoglobin A1c below 7.0 %.  All patients with diabetes should be on statins unless contraindicated.  ACE or ARB therapy if not contraindicated.   4.) Continue metformin 1,000 mg BID.  He stopped taking Trulicity 3 months ago because he felt like he did not need to take it anymore.  I strongly suggested patient start his Trulicity again.  A prescription was sent to pharmacy.   I explained to patient that he should keep snacks with him while working  just in case he experiences a low blood sugar.  He voiced understanding.  5.) Meal planning teaching: Carbohydrate definition - one serving is 15 gms. Carbohydrate spacing - carbohydrates should be spaced into approximately 3 meals with 2 snacks ( of one carbohydrate ) between meals or at bedtime. Increase vegetable intake to 2 or more cups of vegetables per day as well as 2 fruit servings. Recommended low saturated fat, low sodium diet to aid in control of hypertension and cholesterol.  6.) Discussed activity, benefits, methods, and precautions. Recommended patient start or continue some form of exercise and increase as tolerated to 30 minutes per day to facilitate weight loss and aid in control of BGs.  7.) Return to clinic in 6 months for follow up.  Labs Today: A1C.  He was explained the above plan and given opportunity to ask questions. Advised to call clinic with any further questions or concerns.    Marion Mart, JEANETTE-C,  CDE

## 2017-09-08 LAB
ESTIMATED AVG GLUCOSE: 146 MG/DL
HBA1C MFR BLD HPLC: 6.7 %

## 2017-09-12 ENCOUNTER — OFFICE VISIT (OUTPATIENT)
Dept: ORTHOPEDICS | Facility: CLINIC | Age: 62
End: 2017-09-12
Payer: COMMERCIAL

## 2017-09-12 VITALS
HEART RATE: 62 BPM | WEIGHT: 174 LBS | DIASTOLIC BLOOD PRESSURE: 79 MMHG | BODY MASS INDEX: 23.57 KG/M2 | HEIGHT: 72 IN | SYSTOLIC BLOOD PRESSURE: 121 MMHG

## 2017-09-12 DIAGNOSIS — M17.0 PRIMARY OSTEOARTHRITIS OF BOTH KNEES: ICD-10-CM

## 2017-09-12 DIAGNOSIS — M25.561 ACUTE PAIN OF RIGHT KNEE: Primary | ICD-10-CM

## 2017-09-12 DIAGNOSIS — M23.91 DERANGEMENT, KNEE INTERNAL, RIGHT: ICD-10-CM

## 2017-09-12 PROCEDURE — 3078F DIAST BP <80 MM HG: CPT | Mod: S$GLB,,, | Performed by: ORTHOPAEDIC SURGERY

## 2017-09-12 PROCEDURE — 20610 DRAIN/INJ JOINT/BURSA W/O US: CPT | Mod: RT,S$GLB,, | Performed by: ORTHOPAEDIC SURGERY

## 2017-09-12 PROCEDURE — 3074F SYST BP LT 130 MM HG: CPT | Mod: S$GLB,,, | Performed by: ORTHOPAEDIC SURGERY

## 2017-09-12 PROCEDURE — 3008F BODY MASS INDEX DOCD: CPT | Mod: S$GLB,,, | Performed by: ORTHOPAEDIC SURGERY

## 2017-09-12 PROCEDURE — 99213 OFFICE O/P EST LOW 20 MIN: CPT | Mod: 25,S$GLB,, | Performed by: ORTHOPAEDIC SURGERY

## 2017-09-12 PROCEDURE — 99999 PR PBB SHADOW E&M-EST. PATIENT-LVL III: CPT | Mod: PBBFAC,,, | Performed by: ORTHOPAEDIC SURGERY

## 2017-09-12 RX ORDER — TRIAMCINOLONE ACETONIDE 40 MG/ML
80 INJECTION, SUSPENSION INTRA-ARTICULAR; INTRAMUSCULAR
Status: DISCONTINUED | OUTPATIENT
Start: 2017-09-12 | End: 2017-09-12 | Stop reason: HOSPADM

## 2017-09-12 RX ADMIN — TRIAMCINOLONE ACETONIDE 80 MG: 40 INJECTION, SUSPENSION INTRA-ARTICULAR; INTRAMUSCULAR at 03:09

## 2017-09-12 NOTE — PROCEDURES
Large Joint Aspiration/Injection  Date/Time: 9/12/2017 3:53 PM  Performed by: CRISTAL LAI  Authorized by: CRISTAL LAI     Consent Done?:  Yes (Verbal)  Indications:  Pain  Procedure site marked: Yes    Timeout: Prior to procedure the correct patient, procedure, and site was verified      Location:  Knee  Site:  R knee  Prep: Patient was prepped and draped in usual sterile fashion    Ultrasonic Guidance for needle placement: No  Needle size:  22 G  Approach:  Anterolateral  Medications:  80 mg triamcinolone acetonide 40 mg/mL  Patient tolerance:  Patient tolerated the procedure well with no immediate complications

## 2017-09-12 NOTE — PROGRESS NOTES
Subjective:          Chief Complaint: Misha Simpson is a 62 y.o. male who had concerns including Pain of the Right Knee.    Mr. Simpson stepped out of his truck at work into loose concrete and sustained his right knee injury on or about 3/1/2017. He is here today for evaluation after his right knee after starting to have pain more since last week. He was doing well up until then after his injection in April 2017.      Pain   This is a new problem. The current episode started more than 1 month ago. The problem occurs daily. The problem has been gradually worsening. Associated symptoms include arthralgias. Pertinent negatives include no chills or fever. The symptoms are aggravated by walking, twisting and standing. He has tried nothing for the symptoms. The treatment provided no relief.       Review of Systems   Constitution: Negative for chills and fever.   Cardiovascular:        HTN   Endocrine:        Type II DM   Musculoskeletal: Positive for arthralgias, arthritis and joint pain.   All other systems reviewed and are negative.                  Objective:        General: Misha is well-developed, well-nourished, appears stated age, in no acute distress, alert and oriented to time, place and person.     General    Vitals reviewed.  Constitutional: He is oriented to person, place, and time. He appears well-developed and well-nourished. No distress.   HENT:   Head: Normocephalic and atraumatic.   Nose: Nose normal.   Eyes: Pupils are equal, round, and reactive to light.   Cardiovascular: Normal rate.    Pulmonary/Chest: Effort normal.   Neurological: He is alert and oriented to person, place, and time.   Psychiatric: He has a normal mood and affect. His behavior is normal. Judgment and thought content normal.     General Musculoskeletal Exam   Gait: normal       Right Knee Exam     Inspection   Erythema: absent  Scars: absent  Swelling: absent  Effusion: effusion  Deformity: deformity    Tenderness   The patient is  tender to palpation of the medial joint line, medial retinaculum and patella.    Range of Motion   Extension: 0   Flexion: 140     Tests   Meniscus   Herbert:  Medial - negative Lateral - negative  Ligament Examination Lachman: normal (-1 to 2mm) PCL-Posterior Drawer: normal (0 to 2mm)     MCL - Valgus: normal (0 to 2mm)  LCL - Varus: normal  Posterolateral Corner: unstable (>15 degrees difference)  Patella   Patellar Apprehension: negative  Passive Patellar Tilt: neutral  Patellar Tracking: normal  Patellar Glide (quadrants): Lateral - 1   Medial - 2  Q-Angle at 90 degrees: normal  Patellar Grind: negative  J-Sign: none    Other   Sensation: normal    Left Knee Exam     Inspection   Erythema: absent  Scars: present  Swelling: absent  Effusion: absent  Deformity: deformity  Bruising: absent    Range of Motion   Extension: 0   Flexion: 140     Tests   Meniscus   Herbert:  Lateral - negative  Stability Lachman: normal (-1 to 2mm) PCL-Posterior Drawer: normal (0 to 2mm)  MCL - Valgus: normal (0 to 2mm)  LCL - Varus: normal (0 to 2mm)  Posterolateral Corner: unstable (>15 degrees difference)  Patella   Patellar Apprehension: negative  Passive Patellar Tilt: neutral  Patellar Tracking: normal  Patellar Glide (Quadrants): Lateral - 1 Medial - 2  Q-Angle at 90 degrees: normal  Patellar Grind: negative  J-Sign: J sign absent    Other   Sensation: normal    Right Hip Exam     Tests   Art: negative  Left Hip Exam     Tests   Art: negative          Muscle Strength   Right Lower Extremity   Hip Abduction: 5/5   Quadriceps:  5/5   Hamstrin/5     Vascular Exam     Right Pulses    Posterior Tibial:      2+        Left Pulses    Posterior Tibial:      2+        Edema  Right Lower Leg: absent  Left Lower Leg: absent      Current and previous radiographic studies and results were reviewed with the patient:     Standing views of both knees as well as lateral and sunrise views of both knees were obtained.    There is  degenerative arthrosis of the medial compartment of the left knee with joint space narrowing.  There is also degenerative arthrosis of the lateral compartment of the right knee with joint space narrowing.  The patellofemoral articulations are relatively well-maintained bilaterally.  No fracture or subluxation are identified.  There are no signs of joint effusion.   Impression    Degenerative arthrosis of both knees.           Assessment:       Encounter Diagnoses   Name Primary?    Acute pain of right knee Yes    Primary osteoarthritis of both knees     Derangement, knee internal, right           Plan:         Right knee injection  Voltaren Gel  Information for Euflexxa given  Note for work  F/U in 6 weeks

## 2017-09-14 ENCOUNTER — CLINICAL SUPPORT (OUTPATIENT)
Dept: FAMILY MEDICINE | Facility: CLINIC | Age: 62
End: 2017-09-14

## 2017-09-19 ENCOUNTER — OFFICE VISIT (OUTPATIENT)
Dept: ORTHOPEDICS | Facility: CLINIC | Age: 62
End: 2017-09-19
Payer: COMMERCIAL

## 2017-09-19 VITALS
BODY MASS INDEX: 23.57 KG/M2 | HEIGHT: 72 IN | HEART RATE: 56 BPM | SYSTOLIC BLOOD PRESSURE: 126 MMHG | DIASTOLIC BLOOD PRESSURE: 71 MMHG | WEIGHT: 174 LBS

## 2017-09-19 DIAGNOSIS — M17.0 PRIMARY OSTEOARTHRITIS OF BOTH KNEES: Primary | ICD-10-CM

## 2017-09-19 DIAGNOSIS — S83.241A ACUTE MEDIAL MENISCUS TEAR OF RIGHT KNEE, INITIAL ENCOUNTER: ICD-10-CM

## 2017-09-19 DIAGNOSIS — M25.561 ACUTE PAIN OF RIGHT KNEE: ICD-10-CM

## 2017-09-19 DIAGNOSIS — M23.91 DERANGEMENT, KNEE INTERNAL, RIGHT: ICD-10-CM

## 2017-09-19 PROCEDURE — 3078F DIAST BP <80 MM HG: CPT | Mod: S$GLB,,, | Performed by: ORTHOPAEDIC SURGERY

## 2017-09-19 PROCEDURE — 99999 PR PBB SHADOW E&M-EST. PATIENT-LVL III: CPT | Mod: PBBFAC,,, | Performed by: ORTHOPAEDIC SURGERY

## 2017-09-19 PROCEDURE — 3008F BODY MASS INDEX DOCD: CPT | Mod: S$GLB,,, | Performed by: ORTHOPAEDIC SURGERY

## 2017-09-19 PROCEDURE — 3074F SYST BP LT 130 MM HG: CPT | Mod: S$GLB,,, | Performed by: ORTHOPAEDIC SURGERY

## 2017-09-19 PROCEDURE — 99213 OFFICE O/P EST LOW 20 MIN: CPT | Mod: S$GLB,,, | Performed by: ORTHOPAEDIC SURGERY

## 2017-09-19 NOTE — PROGRESS NOTES
Subjective:          Chief Complaint: Misha Simpson is a 62 y.o. male who had concerns including Pain of the Right Knee.    Mr. Simpson is here for f/u after he injured his right knee when walking down the stairs at work on last Thursday. He felt a pop medially and has had pain and mechanical symptoms since then. He was here on Wednesday and received a right knee injection as was doing well until Thursday.    Pain: 9/10            Past Medical History:   Diagnosis Date    Adrenal adenoma 2008    bx benign    Bronchiectasis 5/15/2012    Cataract     Diabetes mellitus, type 2     Emphysema     Hemoptysis     Hyperlipidemia     Hyperplastic colon polyp     repeat recommended 4/2013    Hypertension     Lung nodules     small stable on serial ct    Lymphocytosis     reactive    Peptic ulcer disease     Pneumonia     Tobacco abuse 5/15/2012       Past Surgical History:   Procedure Laterality Date    APPENDECTOMY      BONE MARROW BIOPSY      COLONOSCOPY  8/6/2013            Family History   Problem Relation Age of Onset    Kidney failure Father     Diabetes Father     Heart disease Mother 60    Diabetes Brother          Current Outpatient Prescriptions:     amlodipine (NORVASC) 5 MG tablet, TAKE 1 TABLET BY MOUTH ONCE DAILY, Disp: 90 tablet, Rfl: 0    benazepril (LOTENSIN) 40 MG tablet, TAKE 1 TABLET BY MOUTH ONCE DAILY, Disp: 90 tablet, Rfl: 0    blood sugar diagnostic (BLOOD GLUCOSE TEST) Strp, Test glucose 3 x daily, Disp: 100 strip, Rfl: 11    dulaglutide (TRULICITY) 0.75 mg/0.5 mL PnIj, Inject 0.5 mLs (0.75 mg total) into the skin once a week., Disp: 4 Syringe, Rfl: 6    lancets Misc, As directed, Disp: 100 each, Rfl: 11    metformin (GLUCOPHAGE) 1000 MG tablet, Take 1 tablet (1,000 mg total) by mouth 2 (two) times daily with meals. (Patient taking differently: Take 1,000 mg by mouth daily with breakfast. ), Disp: 60 tablet, Rfl: 3    diclofenac sodium (VOLTAREN) 1 % Gel, Apply 2 g topically  4 (four) times daily. Cannot take NSAIDs secondary GI upset, Disp: 300 g, Rfl: 1    Review of patient's allergies indicates:   Allergen Reactions    Aspirin      Other reaction(s): Stomach upset  Other reaction(s): Nausea       Vitals:    09/19/17 0846   BP: 126/71   Pulse: (!) 56       Review of Systems   Musculoskeletal: Positive for arthritis, joint pain, joint swelling, myalgias and stiffness.   All other systems reviewed and are negative.                  Objective:        General: Misha is well-developed, well-nourished, appears stated age, in no acute distress, alert and oriented to time, place and person.     General    Vitals reviewed.  Constitutional: He is oriented to person, place, and time. He appears well-developed and well-nourished. No distress.   HENT:   Head: Normocephalic and atraumatic.   Eyes: Pupils are equal, round, and reactive to light.   Cardiovascular: Normal rate.    Pulmonary/Chest: Effort normal.   Neurological: He is oriented to person, place, and time.   Psychiatric: He has a normal mood and affect. His behavior is normal. Judgment and thought content normal.     General Musculoskeletal Exam   Gait: antalgic       Right Knee Exam     Inspection   Erythema: absent  Scars: absent  Swelling: absent  Effusion: present  Deformity: deformity  Bruising: absent    Tenderness   The patient is tender to palpation of the medial joint line and medial retinaculum.    Crepitus   The patient has crepitus of the lateral joint line.    Range of Motion   Extension:  0 normal   Flexion:  120 abnormal     Tests   Meniscus   Herbert:  Medial - positive Lateral - negative  Ligament Examination Lachman: normal (-1 to 2mm)   MCL - Valgus: normal (0 to 2mm)  LCL - Varus: normal  Patella   Patellar Apprehension: negative  Passive Patellar Tilt: neutral  Patellar Tracking: normal  Patellar Glide (quadrants): Lateral - 1   Medial - 2  Q-Angle at 90 degrees: normal  Patellar Grind: negative    Other   Meniscal  Cyst: absent  Popliteal (Baker's) Cyst: absent  Sensation: normal    Left Knee Exam   Left knee exam is normal.    Muscle Strength   Right Lower Extremity   Hip Abduction: 5/5   Quadriceps:  5/5   Hamstrin/5     Vascular Exam     Right Pulses  Dorsalis Pedis:      2+  Posterior Tibial:      2+        Edema  Right Lower Leg: absent        .      Assessment:       Encounter Diagnoses   Name Primary?    Acute pain of right knee     Primary osteoarthritis of both knees Yes    Derangement, knee internal, right     Acute medial meniscus tear of right knee, initial encounter           Plan:         MRI of right knee to evaluate for right medial meniscus tear  F/U after MRI

## 2017-10-02 ENCOUNTER — OFFICE VISIT (OUTPATIENT)
Dept: FAMILY MEDICINE | Facility: CLINIC | Age: 62
End: 2017-10-02
Payer: COMMERCIAL

## 2017-10-02 VITALS
HEIGHT: 72 IN | DIASTOLIC BLOOD PRESSURE: 80 MMHG | HEART RATE: 80 BPM | WEIGHT: 172.19 LBS | BODY MASS INDEX: 23.32 KG/M2 | SYSTOLIC BLOOD PRESSURE: 139 MMHG

## 2017-10-02 DIAGNOSIS — M17.0 PRIMARY OSTEOARTHRITIS OF BOTH KNEES: ICD-10-CM

## 2017-10-02 DIAGNOSIS — J47.9 BRONCHIECTASIS WITHOUT COMPLICATION: ICD-10-CM

## 2017-10-02 DIAGNOSIS — E11.9 TYPE 2 DIABETES MELLITUS WITHOUT COMPLICATION, WITHOUT LONG-TERM CURRENT USE OF INSULIN: ICD-10-CM

## 2017-10-02 DIAGNOSIS — M23.91 INTERNAL DERANGEMENT OF RIGHT KNEE: ICD-10-CM

## 2017-10-02 DIAGNOSIS — J44.9 COPD, MILD: ICD-10-CM

## 2017-10-02 DIAGNOSIS — F17.200 SMOKING: ICD-10-CM

## 2017-10-02 DIAGNOSIS — E11.59 HYPERTENSION ASSOCIATED WITH DIABETES: Primary | ICD-10-CM

## 2017-10-02 DIAGNOSIS — I15.2 HYPERTENSION ASSOCIATED WITH DIABETES: Primary | ICD-10-CM

## 2017-10-02 DIAGNOSIS — Z12.5 ENCOUNTER FOR SCREENING FOR MALIGNANT NEOPLASM OF PROSTATE: ICD-10-CM

## 2017-10-02 PROCEDURE — 90472 IMMUNIZATION ADMIN EACH ADD: CPT | Mod: S$GLB,,, | Performed by: FAMILY MEDICINE

## 2017-10-02 PROCEDURE — 90686 IIV4 VACC NO PRSV 0.5 ML IM: CPT | Mod: S$GLB,,, | Performed by: FAMILY MEDICINE

## 2017-10-02 PROCEDURE — 90736 HZV VACCINE LIVE SUBQ: CPT | Mod: S$GLB,,, | Performed by: FAMILY MEDICINE

## 2017-10-02 PROCEDURE — 99214 OFFICE O/P EST MOD 30 MIN: CPT | Mod: 25,S$GLB,, | Performed by: FAMILY MEDICINE

## 2017-10-02 PROCEDURE — 90471 IMMUNIZATION ADMIN: CPT | Mod: S$GLB,,, | Performed by: FAMILY MEDICINE

## 2017-10-02 PROCEDURE — 99999 PR PBB SHADOW E&M-EST. PATIENT-LVL IV: CPT | Mod: PBBFAC,,, | Performed by: FAMILY MEDICINE

## 2017-10-02 RX ORDER — MELOXICAM 7.5 MG/1
7.5 TABLET ORAL DAILY
Qty: 30 TABLET | Refills: 1 | Status: SHIPPED | OUTPATIENT
Start: 2017-10-02 | End: 2018-01-10 | Stop reason: SDUPTHER

## 2017-10-02 RX ORDER — METFORMIN HYDROCHLORIDE 1000 MG/1
1000 TABLET ORAL 2 TIMES DAILY WITH MEALS
Qty: 60 TABLET | Refills: 5 | Status: SHIPPED | OUTPATIENT
Start: 2017-10-02 | End: 2019-03-11 | Stop reason: SDUPTHER

## 2017-10-03 NOTE — PROGRESS NOTES
Hypertension blood pressure controlled.  Diabetes controlled.  Planning on starting smoking cessation through [facility].  Having knee problems followed by orthopedics related to workman's comp type injury.  COPD with bronchiectasis stable followed by [facility].    Lab Results   Component Value Date    HGBA1C 6.7 (H) 09/07/2017    HGBA1C 6.4 (H) 07/06/2017    HGBA1C 8.7 (H) 02/16/2017     Lab Results   Component Value Date    GLUF 147 (H) 01/25/2008    LDLCALC 88.4 02/16/2017    CREATININE 1.1 07/06/2017     Past Medical History:  Past Medical History:   Diagnosis Date    Adrenal adenoma 2008    bx benign    Bronchiectasis 5/15/2012    Bronchiectasis 5/15/2012    Cataract     COPD, mild 10/2/2017    Diabetes mellitus, type 2     Emphysema     Hemoptysis     Hyperlipidemia     Hyperplastic colon polyp     repeat recommended 4/2013    Hypertension     Lung nodules     small stable on serial ct    Lymphocytosis     reactive    Peptic ulcer disease     Pneumonia     Tobacco abuse 5/15/2012     Past Surgical History:   Procedure Laterality Date    APPENDECTOMY      BONE MARROW BIOPSY      COLONOSCOPY  8/6/2013          Social History     Social History    Marital status:      Spouse name: N/A    Number of children: N/A    Years of education: N/A     Occupational History    Not on file.     Social History Main Topics    Smoking status: Current Every Day Smoker     Packs/day: 1.00    Smokeless tobacco: Not on file    Alcohol use 0.6 oz/week     1 Cans of beer per week      Comment: 1 or 2 beers a week     Drug use: No    Sexual activity: Not on file     Other Topics Concern    Not on file     Social History Narrative    No narrative on file     Family History   Problem Relation Age of Onset    Kidney failure Father     Diabetes Father     Heart disease Mother 60    Diabetes Brother      Review of patient's allergies indicates:   Allergen Reactions    Aspirin      Other  reaction(s): Stomach upset  Other reaction(s): Nausea     Current Outpatient Prescriptions on File Prior to Visit   Medication Sig Dispense Refill    benazepril (LOTENSIN) 40 MG tablet TAKE 1 TABLET BY MOUTH ONCE DAILY 90 tablet 0    blood sugar diagnostic (BLOOD GLUCOSE TEST) Strp Test glucose 3 x daily 100 strip 11    dulaglutide (TRULICITY) 0.75 mg/0.5 mL PnIj Inject 0.5 mLs (0.75 mg total) into the skin once a week. 4 Syringe 6    lancets Misc As directed 100 each 11    [DISCONTINUED] metformin (GLUCOPHAGE) 1000 MG tablet Take 1 tablet (1,000 mg total) by mouth 2 (two) times daily with meals. (Patient taking differently: Take 1,000 mg by mouth daily with breakfast. ) 60 tablet 3    amlodipine (NORVASC) 5 MG tablet TAKE 1 TABLET BY MOUTH ONCE DAILY 90 tablet 0    diclofenac sodium (VOLTAREN) 1 % Gel Apply 2 g topically 4 (four) times daily. Cannot take NSAIDs secondary GI upset 300 g 1     No current facility-administered medications on file prior to visit.            ROS:  GENERAL: No fever, chills,  or significant weight changes.   CARDIOVASCULAR: Denies chest pain, PND, orthopnea or reduced exercise tolerance.  ABDOMEN: Appetite fine. Denies diarrhea, abdominal pain, hematemesis or blood in stool.  URINARY: No flank pain, dysuria or hematuria.        Misha was seen today for knee pain.    Diagnoses and all orders for this visit:    Hypertension associated with diabetes    Primary osteoarthritis of both knees    Internal derangement of right knee    Type 2 diabetes mellitus without complication, without long-term current use of insulin  -     Comprehensive metabolic panel; Standing  -     Microalbumin/creatinine urine ratio; Standing  -     Lipid panel; Standing  -     Hemoglobin A1c; Standing  -     metformin (GLUCOPHAGE) 1000 MG tablet; Take 1 tablet (1,000 mg total) by mouth 2 (two) times daily with meals.  -     Ambulatory referral to Optometry    Bronchiectasis without complication    COPD,  mild    Encounter for screening for malignant neoplasm of prostate  -     PSA, Screening; Future    Smoking  -     Ambulatory referral to Smoking Cessation Program    Other orders  -     meloxicam (MOBIC) 7.5 MG tablet; Take 1 tablet (7.5 mg total) by mouth once daily.  -     ranitidine (ZANTAC) 150 MG tablet; Take 1 tablet (150 mg total) by mouth once daily.  -     Influenza - Quadrivalent (3 years & older) (PF)  -     Zoster Vaccine - Live     reviewed most recent laboratory.  Recheck laboratory beginning of March.  Request previous on exam-he will schedule .  Encourage smoking cessation

## 2017-10-05 ENCOUNTER — TELEPHONE (OUTPATIENT)
Dept: PHYSICAL MEDICINE AND REHAB | Facility: CLINIC | Age: 62
End: 2017-10-05

## 2017-10-05 ENCOUNTER — TELEPHONE (OUTPATIENT)
Dept: SMOKING CESSATION | Facility: CLINIC | Age: 62
End: 2017-10-05

## 2017-10-05 NOTE — TELEPHONE ENCOUNTER
----- Message from Mariella Valencia sent at 10/5/2017  2:43 PM CDT -----  Contact: [facility]   cell [phone]  Calling to get clinic noes and copies of caden sent to his worker's comp  please call

## 2017-10-05 NOTE — TELEPHONE ENCOUNTER
Contacted patient to follow up on tobacco cessation as he was a no show for his intake on October 3, 2017. Kelsey  was able to reschedule him for October 9, 2017 at 3:30 in [place].

## 2017-10-10 ENCOUNTER — TELEPHONE (OUTPATIENT)
Dept: SMOKING CESSATION | Facility: CLINIC | Age: 62
End: 2017-10-10

## 2017-10-10 NOTE — TELEPHONE ENCOUNTER
Attempted to contact patient to reschedule his no show from yesterday. I was able to leave a detailed message with my contact information, Maral Muniz, [phone]. Requested a return call.

## 2017-10-20 ENCOUNTER — TELEPHONE (OUTPATIENT)
Dept: ORTHOPEDICS | Facility: CLINIC | Age: 62
End: 2017-10-20

## 2017-10-20 DIAGNOSIS — M25.561 RIGHT KNEE PAIN, UNSPECIFIED CHRONICITY: Primary | ICD-10-CM

## 2017-10-20 NOTE — TELEPHONE ENCOUNTER
Patient does not need a new appt for us to order an MRI that is a waste of the patient's time.  If all she needs is a new order we will place it.

## 2017-10-20 NOTE — TELEPHONE ENCOUNTER
----- Message from Usha Mathews sent at 10/19/2017  1:50 PM CDT -----  Contact: self [phone]  Pt needs a follow up on right knee scheduled in [place].  Please advise

## 2017-10-20 NOTE — TELEPHONE ENCOUNTER
----- Message from Usha Mathews sent at 10/20/2017 10:56 AM CDT -----  Contact: usha  I spoke with  on mri issue.  She states the [id number] request is old and they need another one.  She stated that she faxed over a request to you for pt to be seen to get a new mri request.  Please advise what we need to do

## 2017-10-20 NOTE — TELEPHONE ENCOUNTER
Patient must be scheduled for MRI prior to making a f/u with us.  He is still not scheduled with [facility] he states [place] with his workers comp is suppose to make his apt.

## 2017-10-25 ENCOUNTER — OFFICE VISIT (OUTPATIENT)
Dept: ORTHOPEDICS | Facility: CLINIC | Age: 62
End: 2017-10-25
Payer: COMMERCIAL

## 2017-10-25 VITALS
DIASTOLIC BLOOD PRESSURE: 83 MMHG | WEIGHT: 172 LBS | HEIGHT: 72 IN | BODY MASS INDEX: 23.3 KG/M2 | SYSTOLIC BLOOD PRESSURE: 152 MMHG | HEART RATE: 50 BPM

## 2017-10-25 DIAGNOSIS — M23.91 INTERNAL DERANGEMENT OF RIGHT KNEE: ICD-10-CM

## 2017-10-25 DIAGNOSIS — S83.241D ACUTE MEDIAL MENISCUS TEAR OF RIGHT KNEE, SUBSEQUENT ENCOUNTER: Primary | ICD-10-CM

## 2017-10-25 DIAGNOSIS — M25.561 ACUTE PAIN OF RIGHT KNEE: ICD-10-CM

## 2017-10-25 DIAGNOSIS — M17.0 PRIMARY OSTEOARTHRITIS OF BOTH KNEES: ICD-10-CM

## 2017-10-25 PROCEDURE — 99999 PR PBB SHADOW E&M-EST. PATIENT-LVL III: CPT | Mod: PBBFAC,,, | Performed by: ORTHOPAEDIC SURGERY

## 2017-10-25 PROCEDURE — 99213 OFFICE O/P EST LOW 20 MIN: CPT | Mod: S$GLB,,, | Performed by: ORTHOPAEDIC SURGERY

## 2017-10-25 NOTE — PROGRESS NOTES
Subjective:          Chief Complaint: Misha Simpson is a 62 y.o. male who had concerns including Pain of the Right Knee.    Mr. Simpson is here for f/u after he injured his right knee when walking down the stairs at work on last Thursday. He felt a pop medially and has had pain and mechanical symptoms since then. He was here previously and received a right knee injection as was doing well until the next week.    Since that time he has attempted to return to regular activities but continues to have right knee pain, swelling and mechanical symptoms. He has been in contact with us via telephone and the decision was made to have an MRI done to expedite his return to work. The MRI was denied stating he needed another visit with me. He is here today for that visit.    Pain: 9/10          Pain   Associated symptoms include joint swelling and myalgias.           Past Medical History:   Diagnosis Date    Adrenal adenoma 2008    bx benign    Bronchiectasis 5/15/2012    Bronchiectasis 5/15/2012    Cataract     COPD, mild 10/2/2017    Diabetes mellitus, type 2     Emphysema     Hemoptysis     Hyperlipidemia     Hyperplastic colon polyp     repeat recommended 4/2013    Hypertension     Lung nodules     small stable on serial ct    Lymphocytosis     reactive    Peptic ulcer disease     Pneumonia     Tobacco abuse 5/15/2012       Past Surgical History:   Procedure Laterality Date    APPENDECTOMY      BONE MARROW BIOPSY      COLONOSCOPY  8/6/2013            Family History   Problem Relation Age of Onset    Kidney failure Father     Diabetes Father     Heart disease Mother 60    Diabetes Brother          Current Outpatient Prescriptions:     amlodipine (NORVASC) 5 MG tablet, TAKE 1 TABLET BY MOUTH ONCE DAILY, Disp: 90 tablet, Rfl: 0    benazepril (LOTENSIN) 40 MG tablet, TAKE 1 TABLET BY MOUTH ONCE DAILY, Disp: 90 tablet, Rfl: 0    blood sugar diagnostic (BLOOD GLUCOSE TEST) Strp, Test glucose 3 x daily,  Disp: 100 strip, Rfl: 11    dulaglutide (TRULICITY) 0.75 mg/0.5 mL PnIj, Inject 0.5 mLs (0.75 mg total) into the skin once a week., Disp: 4 Syringe, Rfl: 6    lancets Misc, As directed, Disp: 100 each, Rfl: 11    meloxicam (MOBIC) 7.5 MG tablet, Take 1 tablet (7.5 mg total) by mouth once daily., Disp: 30 tablet, Rfl: 1    metformin (GLUCOPHAGE) 1000 MG tablet, Take 1 tablet (1,000 mg total) by mouth 2 (two) times daily with meals., Disp: 60 tablet, Rfl: 5    ranitidine (ZANTAC) 150 MG tablet, Take 1 tablet (150 mg total) by mouth once daily., Disp: 30 tablet, Rfl: 1    diclofenac sodium (VOLTAREN) 1 % Gel, Apply 2 g topically 4 (four) times daily. Cannot take NSAIDs secondary GI upset, Disp: 300 g, Rfl: 1    Review of patient's allergies indicates:   Allergen Reactions    Aspirin      Other reaction(s): Stomach upset  Other reaction(s): Nausea       Vitals:    10/25/17 0958   BP: (!) 152/83   Pulse: (!) 50       Review of Systems   Musculoskeletal: Positive for arthritis, joint pain, joint swelling, myalgias and stiffness.   All other systems reviewed and are negative.                  Objective:        General: Misha is well-developed, well-nourished, appears stated age, in no acute distress, alert and oriented to time, place and person.     General    Vitals reviewed.  Constitutional: He is oriented to person, place, and time. He appears well-developed and well-nourished. No distress.   HENT:   Head: Normocephalic and atraumatic.   Eyes: Pupils are equal, round, and reactive to light.   Cardiovascular: Normal rate.    Pulmonary/Chest: Effort normal.   Neurological: He is oriented to person, place, and time.   Psychiatric: He has a normal mood and affect. His behavior is normal. Judgment and thought content normal.     General Musculoskeletal Exam   Gait: antalgic       Right Knee Exam     Inspection   Erythema: absent  Scars: absent  Swelling: absent  Effusion: present  Deformity: deformity  Bruising:  absent    Tenderness   The patient is tender to palpation of the medial joint line and medial retinaculum.    Crepitus   The patient has crepitus of the lateral joint line.    Range of Motion   Extension:  0 normal   Flexion:  120 abnormal     Tests   Meniscus   Herbert:  Medial - positive Lateral - negative  Ligament Examination Lachman: normal (-1 to 2mm)   MCL - Valgus: normal (0 to 2mm)  LCL - Varus: normal  Patella   Patellar Apprehension: negative  Passive Patellar Tilt: neutral  Patellar Tracking: normal  Patellar Glide (quadrants): Lateral - 1   Medial - 2  Q-Angle at 90 degrees: normal  Patellar Grind: negative    Other   Meniscal Cyst: absent  Popliteal (Baker's) Cyst: absent  Sensation: normal    Left Knee Exam   Left knee exam is normal.    Muscle Strength   Right Lower Extremity   Hip Abduction: 5/5   Quadriceps:  5/5   Hamstrin/5     Vascular Exam     Right Pulses  Dorsalis Pedis:      2+  Posterior Tibial:      2+        Edema  Right Lower Leg: absent    Current and previous radiographic studies and results were reviewed with the patient:   Standing views of both knees as well as lateral and sunrise views of both knees were obtained.    There is degenerative arthrosis of the medial compartment of the left knee with joint space narrowing.  There is also degenerative arthrosis of the lateral compartment of the right knee with joint space narrowing.  The patellofemoral articulations are relatively well-maintained bilaterally.  No fracture or subluxation are identified.  There are no signs of joint effusion.   Impression      Degenerative arthrosis of both knees.             Assessment:       Encounter Diagnoses   Name Primary?    Acute pain of right knee     Primary osteoarthritis of both knees     Acute medial meniscus tear of right knee, subsequent encounter Yes    Internal derangement of right knee           Plan:         Worker's Compensation information completed  MRI of right knee to  evaluate for right medial meniscus tear  F/U after MRI

## 2017-11-07 ENCOUNTER — TELEPHONE (OUTPATIENT)
Dept: SMOKING CESSATION | Facility: CLINIC | Age: 62
End: 2017-11-07

## 2017-11-07 NOTE — TELEPHONE ENCOUNTER
Contacted patient to reschedule intake for smoking cesstion. He is scheduled for 11/13/17 at 9:30.

## 2017-11-13 ENCOUNTER — CLINICAL SUPPORT (OUTPATIENT)
Dept: SMOKING CESSATION | Facility: CLINIC | Age: 62
End: 2017-11-13
Payer: COMMERCIAL

## 2017-11-13 DIAGNOSIS — F17.210 MODERATE SMOKER (20 OR LESS PER DAY): Primary | ICD-10-CM

## 2017-11-13 PROCEDURE — 99404 PREV MED CNSL INDIV APPRX 60: CPT | Mod: S$GLB,,,

## 2017-11-13 RX ORDER — IBUPROFEN 200 MG
1 TABLET ORAL DAILY
Qty: 14 PATCH | Refills: 0 | Status: SHIPPED | OUTPATIENT
Start: 2017-11-13 | End: 2017-11-21 | Stop reason: SDUPTHER

## 2017-11-15 DIAGNOSIS — M23.91 INTERNAL DERANGEMENT OF RIGHT KNEE: ICD-10-CM

## 2017-11-15 DIAGNOSIS — M25.561 ACUTE PAIN OF RIGHT KNEE: ICD-10-CM

## 2017-11-15 DIAGNOSIS — S83.241D ACUTE MEDIAL MENISCUS TEAR OF RIGHT KNEE, SUBSEQUENT ENCOUNTER: Primary | ICD-10-CM

## 2017-11-20 RX ORDER — BENAZEPRIL HYDROCHLORIDE 40 MG/1
TABLET ORAL
Qty: 90 TABLET | Refills: 0 | Status: SHIPPED | OUTPATIENT
Start: 2017-11-20 | End: 2018-03-14 | Stop reason: SDUPTHER

## 2017-11-20 RX ORDER — HYDROCHLOROTHIAZIDE 12.5 MG/1
CAPSULE ORAL
Qty: 90 CAPSULE | Refills: 0 | Status: SHIPPED | OUTPATIENT
Start: 2017-11-20 | End: 2018-03-14 | Stop reason: SDUPTHER

## 2017-11-20 RX ORDER — AMLODIPINE BESYLATE 5 MG/1
TABLET ORAL
Qty: 90 TABLET | Refills: 0 | Status: SHIPPED | OUTPATIENT
Start: 2017-11-20 | End: 2019-03-25

## 2017-11-21 ENCOUNTER — CLINICAL SUPPORT (OUTPATIENT)
Dept: SMOKING CESSATION | Facility: CLINIC | Age: 62
End: 2017-11-21
Payer: COMMERCIAL

## 2017-11-21 DIAGNOSIS — F17.210 LIGHT CIGARETTE SMOKER (1-9 CIGS/DAY): Primary | ICD-10-CM

## 2017-11-21 DIAGNOSIS — F17.210 MODERATE SMOKER (20 OR LESS PER DAY): ICD-10-CM

## 2017-11-21 PROCEDURE — 90853 GROUP PSYCHOTHERAPY: CPT | Mod: S$GLB,,,

## 2017-11-21 RX ORDER — IBUPROFEN 200 MG
1 TABLET ORAL DAILY
Qty: 28 PATCH | Refills: 0 | Status: SHIPPED | OUTPATIENT
Start: 2017-11-21 | End: 2018-08-06

## 2017-11-21 NOTE — PROGRESS NOTES
Smoking Cessation Group Session #3    Site: [facility]  Date:  11/21/2017  Clinical Status of Patient: Outpatient   Length of Service and Code: 90 minutes - 80662   Number in Attendance: 2  Group Activities/Focus of Group:  completion of TCRS (Tobacco Cessation Rating Scale) reviewed strategies, controlling environment, cues, triggers, new goals set. Introduced high risk situations with preparation interventions, caffeine similarities with withdrawal issues of habit and nicotine, Alcohol, Understanding urges, cravings, stress and relaxation. Open discussion with intervention discussion.    Target symptoms:  withdrawal and medication side effects             The following were rated moderate (3) to severe (4) on TCRS:       Moderate 3: none     Severe 4:   none  Patient's Response to Intervention: Patient reports decreasing cigarette smoking from 1/2 to a pack per day for 53 years to 5 per day. He is pleased with his progress and has set a quit date of 12/01/17.  The patient remains on the prescribed tobacco cessation medication regimen of a 21 mg nicotine patch QD without any negative side effects at this time.     Progress Toward Goals and Other Mental Status Changes: The patient denies any abnormal behavioral or mental changes at this time.     Plan: The patient will continue with group therapy sessions and medication monitoring by CTTS. Prescribed medication management will be by physician.     Return to Clinic: 1 week    Quit Date: none   Planned Quit Date: 12/1/17

## 2017-11-21 NOTE — Clinical Note
Patient reports decreasing cigarette smoking from 1/2 to a pack per day for 53 years to 5 per day. He is pleased with his progress and has set a quit date of 12/01/17.  The patient remains on the prescribed tobacco cessation medication regimen of a 21 mg nicotine patch QD without any negative side effects at this time.

## 2017-11-28 ENCOUNTER — CLINICAL SUPPORT (OUTPATIENT)
Dept: SMOKING CESSATION | Facility: CLINIC | Age: 62
End: 2017-11-28
Payer: COMMERCIAL

## 2017-11-28 DIAGNOSIS — F17.210 MODERATE SMOKER (20 OR LESS PER DAY): Primary | ICD-10-CM

## 2017-11-28 PROCEDURE — 99999 PR PBB SHADOW E&M-EST. PATIENT-LVL I: CPT | Mod: PBBFAC,,,

## 2017-11-28 PROCEDURE — 90853 GROUP PSYCHOTHERAPY: CPT | Mod: S$GLB,,,

## 2017-11-28 NOTE — Clinical Note
Patient reports decreasing cigarette smoking from 1.5 packs per day to 1 pack per day. He has set a quit date of 12/01/17. The patient remains on the prescribed tobacco cessation medication regimen of 21 mg nicotine patch QD without any negative side effects at this time. Patient reports having a starter pack of Chantix at home that his physician ordered for him in the past. Patient states he is not using Chantix at this time.

## 2017-11-28 NOTE — PROGRESS NOTES
Smoking Cessation Group Session #4    Site: [facility]  Date:  11/28/2017  Clinical Status of Patient: Outpatient   Length of Service and Code: 90 minutes  Number in Attendance: 3  Group Activities/Focus of Group:  completion of TCRS (Tobacco Cessation Rating Scale) reviewed strategies, habitual behavior, stress, and high risk situations. Introduced stress with addition interventions, SOLVE, relaxation with interventions, nutrition, exercise, weight gain, and the importance of rewarding oneself for accomplishments toward becoming tobacco free. Open discussion of all items with interventions.     Target symptoms:  withdrawal and medication side effects             The following were rated moderate (3) to severe (4) on TCRS:       Moderate 3: none     Severe 4:   none  Patient's Response to Intervention: Patient reports decreasing cigarette smoking from 1.5 packs per day to 1 pack per day. He has set a quit date of 12/01/17. The patient remains on the prescribed tobacco cessation medication regimen of 21 mg nicotine patch QD without any negative side effects at this time. Patient reports having a starter pack of Chantix at home that his physician ordered for him in the past. Patient states he is not using Chantix at this time.     Progress Toward Goals and Other Mental Status Changes: The patient denies any abnormal behavioral or mental changes at this time.     Plan: The patient will continue with group therapy sessions and medication monitoring by CTTS. Prescribed medication management will be by physician.     Return to Clinic: 1 week    Quit Date: none   Planned Quit Date: 12/01/17

## 2017-11-29 ENCOUNTER — OFFICE VISIT (OUTPATIENT)
Dept: FAMILY MEDICINE | Facility: CLINIC | Age: 62
End: 2017-11-29
Payer: COMMERCIAL

## 2017-11-29 ENCOUNTER — LAB VISIT (OUTPATIENT)
Dept: LAB | Facility: HOSPITAL | Age: 62
End: 2017-11-29
Attending: FAMILY MEDICINE
Payer: COMMERCIAL

## 2017-11-29 VITALS
SYSTOLIC BLOOD PRESSURE: 121 MMHG | HEIGHT: 72 IN | BODY MASS INDEX: 22.48 KG/M2 | TEMPERATURE: 98 F | DIASTOLIC BLOOD PRESSURE: 69 MMHG | HEART RATE: 64 BPM | WEIGHT: 166 LBS

## 2017-11-29 DIAGNOSIS — E11.59 HYPERTENSION ASSOCIATED WITH DIABETES: ICD-10-CM

## 2017-11-29 DIAGNOSIS — S83.241D ACUTE MEDIAL MENISCUS TEAR OF RIGHT KNEE, SUBSEQUENT ENCOUNTER: ICD-10-CM

## 2017-11-29 DIAGNOSIS — Z01.818 PRE-OPERATIVE EXAMINATION: Primary | ICD-10-CM

## 2017-11-29 DIAGNOSIS — J43.9 PULMONARY EMPHYSEMA, UNSPECIFIED EMPHYSEMA TYPE: ICD-10-CM

## 2017-11-29 DIAGNOSIS — E78.2 COMBINED HYPERLIPIDEMIA ASSOCIATED WITH TYPE 2 DIABETES MELLITUS: ICD-10-CM

## 2017-11-29 DIAGNOSIS — E11.9 TYPE 2 DIABETES MELLITUS WITHOUT COMPLICATION, WITHOUT LONG-TERM CURRENT USE OF INSULIN: ICD-10-CM

## 2017-11-29 DIAGNOSIS — F17.200 SMOKING: ICD-10-CM

## 2017-11-29 DIAGNOSIS — I15.2 HYPERTENSION ASSOCIATED WITH DIABETES: ICD-10-CM

## 2017-11-29 DIAGNOSIS — E11.69 COMBINED HYPERLIPIDEMIA ASSOCIATED WITH TYPE 2 DIABETES MELLITUS: ICD-10-CM

## 2017-11-29 DIAGNOSIS — Z01.818 PRE-OPERATIVE EXAMINATION: ICD-10-CM

## 2017-11-29 DIAGNOSIS — J44.9 COPD, MILD: ICD-10-CM

## 2017-11-29 LAB
ANION GAP SERPL CALC-SCNC: 8 MMOL/L
BASOPHILS # BLD AUTO: 0.02 K/UL
BASOPHILS NFR BLD: 0.4 %
BUN SERPL-MCNC: 10 MG/DL
CALCIUM SERPL-MCNC: 9.6 MG/DL
CHLORIDE SERPL-SCNC: 102 MMOL/L
CO2 SERPL-SCNC: 27 MMOL/L
CREAT SERPL-MCNC: 1.1 MG/DL
DIFFERENTIAL METHOD: ABNORMAL
EOSINOPHIL # BLD AUTO: 0.2 K/UL
EOSINOPHIL NFR BLD: 4.4 %
ERYTHROCYTE [DISTWIDTH] IN BLOOD BY AUTOMATED COUNT: 14.2 %
EST. GFR  (AFRICAN AMERICAN): >60 ML/MIN/1.73 M^2
EST. GFR  (NON AFRICAN AMERICAN): >60 ML/MIN/1.73 M^2
GLUCOSE SERPL-MCNC: 94 MG/DL
HCT VFR BLD AUTO: 42.1 %
HGB BLD-MCNC: 14.2 G/DL
IMM GRANULOCYTES # BLD AUTO: 0.01 K/UL
IMM GRANULOCYTES NFR BLD AUTO: 0.2 %
LYMPHOCYTES # BLD AUTO: 2.5 K/UL
LYMPHOCYTES NFR BLD: 46.7 %
MCH RBC QN AUTO: 31.6 PG
MCHC RBC AUTO-ENTMCNC: 33.7 G/DL
MCV RBC AUTO: 94 FL
MONOCYTES # BLD AUTO: 0.6 K/UL
MONOCYTES NFR BLD: 11.4 %
NEUTROPHILS # BLD AUTO: 2 K/UL
NEUTROPHILS NFR BLD: 36.9 %
NRBC BLD-RTO: 0 /100 WBC
PLATELET # BLD AUTO: 264 K/UL
PMV BLD AUTO: 11.6 FL
POTASSIUM SERPL-SCNC: 3.8 MMOL/L
RBC # BLD AUTO: 4.5 M/UL
SODIUM SERPL-SCNC: 137 MMOL/L
WBC # BLD AUTO: 5.44 K/UL

## 2017-11-29 PROCEDURE — 36415 COLL VENOUS BLD VENIPUNCTURE: CPT | Mod: PO

## 2017-11-29 PROCEDURE — 85025 COMPLETE CBC W/AUTO DIFF WBC: CPT

## 2017-11-29 PROCEDURE — 80048 BASIC METABOLIC PNL TOTAL CA: CPT

## 2017-11-29 PROCEDURE — 99244 OFF/OP CNSLTJ NEW/EST MOD 40: CPT | Mod: S$GLB,,, | Performed by: FAMILY MEDICINE

## 2017-11-29 PROCEDURE — 93005 ELECTROCARDIOGRAM TRACING: CPT | Mod: S$GLB,,, | Performed by: FAMILY MEDICINE

## 2017-11-29 PROCEDURE — 99999 PR PBB SHADOW E&M-EST. PATIENT-LVL III: CPT | Mod: PBBFAC,,, | Performed by: FAMILY MEDICINE

## 2017-11-29 PROCEDURE — 93010 ELECTROCARDIOGRAM REPORT: CPT | Mod: S$GLB,,, | Performed by: INTERNAL MEDICINE

## 2017-11-29 NOTE — PROGRESS NOTES
Patient injured his knee at work this past March and is pending knee arthroscopy by Dr. Starks on December 14 for torn meniscus.  He has medical problems as below to include diabetes hypertension hyperlipidemia COPD.  Problems stable.  Denies chest pain or shortness of breath.  Does continue to smoke, trying to quit.    Past Medical History:  Past Medical History:   Diagnosis Date    Adrenal adenoma 2008    bx benign    Bronchiectasis 5/15/2012    Bronchiectasis 5/15/2012    Cataract     COPD, mild 10/2/2017    Diabetes mellitus, type 2     Emphysema     Hemoptysis     Hyperlipidemia     Hyperplastic colon polyp     repeat recommended 4/2013    Hypertension     Lung nodules     small stable on serial ct    Lymphocytosis     reactive    Peptic ulcer disease     Pneumonia     Seizures     Tobacco abuse 5/15/2012     Past Surgical History:   Procedure Laterality Date    APPENDECTOMY      BONE MARROW BIOPSY      COLONOSCOPY  8/6/2013          Social History     Social History    Marital status:      Spouse name: N/A    Number of children: N/A    Years of education: N/A     Occupational History    Not on file.     Social History Main Topics    Smoking status: Current Every Day Smoker     Packs/day: 0.50     Years: 53.00     Types: Cigarettes    Smokeless tobacco: Never Used    Alcohol use 0.6 oz/week     1 Cans of beer per week      Comment: 1 or 2 beers a week     Drug use: No    Sexual activity: Not on file     Other Topics Concern    Not on file     Social History Narrative    No narrative on file     Family History   Problem Relation Age of Onset    Kidney failure Father     Diabetes Father     Heart disease Mother 60    Diabetes Brother      Review of patient's allergies indicates:   Allergen Reactions    Aspirin      Other reaction(s): Stomach upset  Other reaction(s): Nausea     Current Outpatient Prescriptions on File Prior to Visit   Medication Sig Dispense Refill     amLODIPine (NORVASC) 5 MG tablet TAKE 1 TABLET BY MOUTH ONCE DAILY 90 tablet 0    benazepril (LOTENSIN) 40 MG tablet TAKE 1 TABLET BY MOUTH ONCE DAILY 90 tablet 0    blood sugar diagnostic (BLOOD GLUCOSE TEST) Strp Test glucose 3 x daily 100 strip 11    dulaglutide (TRULICITY) 0.75 mg/0.5 mL PnIj Inject 0.5 mLs (0.75 mg total) into the skin once a week. 4 Syringe 6    hydroCHLOROthiazide (MICROZIDE) 12.5 mg capsule TAKE ONE CAPSULE BY MOUTH DAILY 90 capsule 0    lancets Misc As directed 100 each 11    meloxicam (MOBIC) 7.5 MG tablet Take 1 tablet (7.5 mg total) by mouth once daily. 30 tablet 1    metformin (GLUCOPHAGE) 1000 MG tablet Take 1 tablet (1,000 mg total) by mouth 2 (two) times daily with meals. 60 tablet 5    nicotine (NICODERM CQ) 21 mg/24 hr Place 1 patch onto the skin once daily. 28 patch 0    ranitidine (ZANTAC) 150 MG tablet Take 1 tablet (150 mg total) by mouth once daily. 30 tablet 1    diclofenac sodium (VOLTAREN) 1 % Gel Apply 2 g topically 4 (four) times daily. Cannot take NSAIDs secondary GI upset 300 g 1     No current facility-administered medications on file prior to visit.            ROS:  GENERAL: No fever, chills,  or significant weight changes.   CARDIOVASCULAR: Denies chest pain, PND, orthopnea or reduced exercise tolerance.  ABDOMEN: Appetite fine. Denies diarrhea, abdominal pain, hematemesis or blood in stool.  URINARY: No flank pain, dysuria or hematuria.      OBJECTIVE:     Vitals:    11/29/17 1104   BP: 121/69   Pulse: 64   Temp: 98.4 °F (36.9 °C)   Weight: 75.3 kg (166 lb)   Height: 6' (1.829 m)     Wt Readings from Last 3 Encounters:   11/29/17 75.3 kg (166 lb)   10/25/17 78 kg (172 lb)   10/02/17 78.1 kg (172 lb 2.9 oz)     APPEARANCE: Well nourished, well developed, in no acute distress.    HEAD: Normocephalic.  Atraumatic.  No sinus tenderness.  EYES:   Right eye: Pupil reactive.  Conjunctiva clear.    Left eye: Pupil reactive.  Conjunctiva clear.    Both fundi:   Grossly normal to nondilated exam. EOMI.    EARS: TM's intact. Light reflex normal. No retraction or perforation.    NOSE:  clear.  MOUTH & THROAT:  No pharyngeal erythema or exudate. No lesions.  NECK: Supple. No bruits.  No JVD.  No cervical lymphadenopathy.  No thyromegaly.    CHEST: Breath sounds clear bilaterally.  Normal respiratory effort  CARDIOVASCULAR: Normal rate.  Regular rhythm.  No murmurs.  No rub.  No gallops.  ABDOMEN: Bowel sounds normal.  Soft.  No tenderness.  No organomegaly.  PERIPHERAL VASCULAR: No cyanosis.  No clubbing.  No edema.  NEUROLOGIC: No focal findings.  MENTAL STATUS: Alert.  Oriented x 3.          Diagnoses and all orders for this visit:    Pre-operative examination  -     Basic metabolic panel; Future  -     CBC auto differential; Future  -     EKG 12-lead    Acute medial meniscus tear of right knee, subsequent encounter    COPD, mild    Pulmonary emphysema, unspecified emphysema type    Hypertension associated with diabetes  -     Basic metabolic panel; Future  -     CBC auto differential; Future  -     EKG 12-lead    Combined hyperlipidemia associated with type 2 diabetes mellitus    Type 2 diabetes mellitus without complication, without long-term current use of insulin    Smoking      Patient is clear for planned knee arthroscopy.  Note to Dr. Starks.

## 2017-12-12 ENCOUNTER — OFFICE VISIT (OUTPATIENT)
Dept: OPHTHALMOLOGY | Facility: CLINIC | Age: 62
End: 2017-12-12
Payer: COMMERCIAL

## 2017-12-12 DIAGNOSIS — E11.9 DIABETES MELLITUS TYPE 2 WITHOUT RETINOPATHY: ICD-10-CM

## 2017-12-12 DIAGNOSIS — H25.13 NUCLEAR SCLEROSIS, BILATERAL: ICD-10-CM

## 2017-12-12 DIAGNOSIS — H31.011 MACULAR SCAR OF RIGHT EYE: ICD-10-CM

## 2017-12-12 DIAGNOSIS — H40.003 GLAUCOMA SUSPECT OF BOTH EYES: Primary | ICD-10-CM

## 2017-12-12 PROCEDURE — 92134 CPTRZ OPH DX IMG PST SGM RTA: CPT | Mod: S$GLB,,, | Performed by: OPHTHALMOLOGY

## 2017-12-12 PROCEDURE — 99999 PR PBB SHADOW E&M-EST. PATIENT-LVL II: CPT | Mod: PBBFAC,,, | Performed by: OPHTHALMOLOGY

## 2017-12-12 PROCEDURE — 92004 COMPRE OPH EXAM NEW PT 1/>: CPT | Mod: S$GLB,,, | Performed by: OPHTHALMOLOGY

## 2017-12-12 PROCEDURE — 92015 DETERMINE REFRACTIVE STATE: CPT | Mod: S$GLB,,, | Performed by: OPHTHALMOLOGY

## 2017-12-12 PROCEDURE — 92083 EXTENDED VISUAL FIELD XM: CPT | Mod: S$GLB,,, | Performed by: OPHTHALMOLOGY

## 2017-12-12 NOTE — LETTER
December 12, 2017      Trino Snyder MD  [id number] [facility] [id number]           [facility]  [address] [facility] [address] [place] [address]  Phone: [phone]  Fax: [phone]          Patient: Misha Simpson   MR Number: [id number]   YOB: 1955   Date of Visit: 12/12/2017       Dear Dr. Trino Snyder:    Thank you for referring Misha Simpson to me for evaluation. Attached you will find relevant portions of my assessment and plan of care.    If you have questions, please do not hesitate to call me. I look forward to following Misha Simpson along with you.    Sincerely,    Deirdre Dixon MD    Enclosure  CC:  No Recipients    If you would like to receive this communication electronically, please contact [email or web address] or [phone] to request more information on [facility] Link access.    For providers and/or their staff who would like to refer a patient to [facility], please contact us through our one-stop-shop provider referral line, [facility], at [phone].    If you feel you have received this communication in error or would no longer like to receive these types of communications, please e-mail [email or web address]

## 2017-12-12 NOTE — PROGRESS NOTES
"HPI     Glaucoma Suspect    Additional comments: glaucoma suspect  // no drops //  dle x 1 yr//    [employer] [facility] [employer]//  pt states he has had "the button test"           Blurred Vision    Additional comments: blurred va at distance only//  wears bifocal//           Diabetic Eye Exam    Additional comments: last a1c was 6.7 on 9/7/2017//           Comments   glaucoma suspect  // no drops // [employer] [facility] [employer]//  pt states he   has had "the button test"  Little over a yr ago several times//  blurred va at distance only//  wears bifocal//pos for flashes when   straining himself//  No floaters.//  Dm Hemoglobin A1C       Date                     Value               Ref Range             Status                09/07/2017               6.7 (H)             4.0 - 5.6 %           Final                       07/06/2017               6.4 (H)             4.0 - 5.6 %           Final                     02/16/2017               8.7 (H)             4.5 - 6.2 %           Final                ----------         Last edited by Tami Larose on 12/12/2017  9:54 AM. (History)        ROS     Positive for: Endocrine (DM), Cardiovascular (HTN - controlled with meds   per pt)    Negative for: Neurological (denies neuropathy), Genitourinary (denies   nephropathy), Eyes (denies surgery, laser), Respiratory (mild COPD? denies   SOB )    Last edited by Deirdre Dixon MD on 12/12/2017 11:21 AM.   (History)        Assessment /Plan     For exam results, see Encounter Report.    Glaucoma suspect of both eyes  -     [facility] Visual Field - OU - Extended - Both Eyes; Standing  -     [facility] Retina Tomography (HRT) - OU - Both Eyes; Standing    Diabetes mellitus type 2 without retinopathy    Nuclear sclerosis, bilateral    Macular scar of right eye           Glaucoma, suspect  Based on C:D ratio. Large optic nerves, HVF WNL OU. Low IOP, thin CCT, no known family history. Open on last gonioscopy. HRT - possible slight " progression OS, OD appears stable. Repeat Q year.   Paternal great-grandmother may have had glaucoma.   1. Diabetes mellitus, type 2  No NPDR, no CSME   2.  Based on C:D ratio. IOP WNL, no family history.    3. Macular scar - Right Eye  Schedule photos with next DM eye exam   4. Myopia with astigmatism and presbyopia  MRx given

## 2017-12-13 ENCOUNTER — OFFICE VISIT (OUTPATIENT)
Dept: ORTHOPEDICS | Facility: CLINIC | Age: 62
End: 2017-12-13
Payer: COMMERCIAL

## 2017-12-13 ENCOUNTER — ANESTHESIA EVENT (OUTPATIENT)
Dept: SURGERY | Facility: HOSPITAL | Age: 62
End: 2017-12-13
Payer: COMMERCIAL

## 2017-12-13 VITALS
SYSTOLIC BLOOD PRESSURE: 139 MMHG | BODY MASS INDEX: 23.48 KG/M2 | DIASTOLIC BLOOD PRESSURE: 83 MMHG | HEART RATE: 59 BPM | WEIGHT: 173.38 LBS | HEIGHT: 72 IN

## 2017-12-13 DIAGNOSIS — M25.561 ACUTE PAIN OF RIGHT KNEE: ICD-10-CM

## 2017-12-13 DIAGNOSIS — Z98.890 S/P RIGHT KNEE ARTHROSCOPY: ICD-10-CM

## 2017-12-13 DIAGNOSIS — S83.241D ACUTE MEDIAL MENISCUS TEAR OF RIGHT KNEE, SUBSEQUENT ENCOUNTER: Primary | ICD-10-CM

## 2017-12-13 DIAGNOSIS — M17.0 PRIMARY OSTEOARTHRITIS OF BOTH KNEES: ICD-10-CM

## 2017-12-13 DIAGNOSIS — M23.91 INTERNAL DERANGEMENT OF RIGHT KNEE: ICD-10-CM

## 2017-12-13 DIAGNOSIS — G89.18 POST-OP PAIN: ICD-10-CM

## 2017-12-13 PROCEDURE — 99214 OFFICE O/P EST MOD 30 MIN: CPT | Mod: 57,S$GLB,, | Performed by: ORTHOPAEDIC SURGERY

## 2017-12-13 PROCEDURE — 99999 PR PBB SHADOW E&M-EST. PATIENT-LVL III: CPT | Mod: PBBFAC,,, | Performed by: ORTHOPAEDIC SURGERY

## 2017-12-13 RX ORDER — PROMETHAZINE HYDROCHLORIDE 25 MG/1
25 TABLET ORAL EVERY 6 HOURS PRN
Qty: 20 TABLET | Refills: 0 | Status: SHIPPED | OUTPATIENT
Start: 2017-12-13 | End: 2018-08-06

## 2017-12-13 RX ORDER — DOCUSATE SODIUM 100 MG/1
100 CAPSULE, LIQUID FILLED ORAL 2 TIMES DAILY
Qty: 60 CAPSULE | Refills: 0 | Status: SHIPPED | OUTPATIENT
Start: 2017-12-13 | End: 2018-08-06

## 2017-12-13 RX ORDER — MUPIROCIN 20 MG/G
OINTMENT TOPICAL
Status: CANCELLED | OUTPATIENT
Start: 2017-12-13

## 2017-12-13 RX ORDER — OXYCODONE AND ACETAMINOPHEN 5; 325 MG/1; MG/1
1 TABLET ORAL EVERY 8 HOURS PRN
Qty: 90 TABLET | Refills: 0 | Status: SHIPPED | OUTPATIENT
Start: 2017-12-13 | End: 2018-08-06

## 2017-12-13 RX ORDER — SODIUM CHLORIDE 9 MG/ML
INJECTION, SOLUTION INTRAVENOUS CONTINUOUS
Status: CANCELLED | OUTPATIENT
Start: 2017-12-13

## 2017-12-13 NOTE — PROGRESS NOTES
Subjective:          Chief Complaint: Misha Simpson is a 62 y.o. male who had concerns including Pain of the Right Knee.    Mr. Simpson is here for f/u after he injured his right knee when walking down the stairs at work on last Thursday. He felt a pop medially and has had pain and mechanical symptoms since then. He was here previously and received a right knee injection as was doing well until the next week.    Mr. Simpson is here for pre-op for his right knee arthroscopy.    Pain: 6/10          Pain   Associated symptoms include joint swelling and myalgias.           Past Medical History:   Diagnosis Date    Adrenal adenoma 2008    bx benign    Arthritis     Bronchiectasis 5/15/2012    Bronchiectasis 5/15/2012    Cataract     COPD, mild 10/2/2017    Diabetes mellitus, type 2     Emphysema     Hemoptysis     Hyperlipidemia     Hyperplastic colon polyp     repeat recommended 4/2013    Hypertension     Lung nodules     small stable on serial ct    Lymphocytosis     reactive    Peptic ulcer disease     Pneumonia     Seizures     Tobacco abuse 5/15/2012       Past Surgical History:   Procedure Laterality Date    APPENDECTOMY      BONE MARROW BIOPSY      COLONOSCOPY  8/6/2013            Family History   Problem Relation Age of Onset    Kidney failure Father     Diabetes Father     Heart disease Mother 60    Diabetes Brother     Cancer Brother      prostate    Glaucoma Brother      ?    Cancer Brother     Amblyopia Neg Hx     Blindness Neg Hx     Cataracts Neg Hx     Hypertension Neg Hx     Macular degeneration Neg Hx     Retinal detachment Neg Hx     Strabismus Neg Hx     Stroke Neg Hx     Thyroid disease Neg Hx          Current Outpatient Prescriptions:     amLODIPine (NORVASC) 5 MG tablet, TAKE 1 TABLET BY MOUTH ONCE DAILY, Disp: 90 tablet, Rfl: 0    benazepril (LOTENSIN) 40 MG tablet, TAKE 1 TABLET BY MOUTH ONCE DAILY, Disp: 90 tablet, Rfl: 0    blood sugar diagnostic (BLOOD  GLUCOSE TEST) Strp, Test glucose 3 x daily, Disp: 100 strip, Rfl: 11    dulaglutide (TRULICITY) 0.75 mg/0.5 mL PnIj, Inject 0.5 mLs (0.75 mg total) into the skin once a week., Disp: 4 Syringe, Rfl: 6    hydroCHLOROthiazide (MICROZIDE) 12.5 mg capsule, TAKE ONE CAPSULE BY MOUTH DAILY, Disp: 90 capsule, Rfl: 0    lancets Misc, As directed, Disp: 100 each, Rfl: 11    meloxicam (MOBIC) 7.5 MG tablet, Take 1 tablet (7.5 mg total) by mouth once daily., Disp: 30 tablet, Rfl: 1    metformin (GLUCOPHAGE) 1000 MG tablet, Take 1 tablet (1,000 mg total) by mouth 2 (two) times daily with meals., Disp: 60 tablet, Rfl: 5    nicotine (NICODERM CQ) 21 mg/24 hr, Place 1 patch onto the skin once daily., Disp: 28 patch, Rfl: 0    ranitidine (ZANTAC) 150 MG tablet, Take 1 tablet (150 mg total) by mouth once daily., Disp: 30 tablet, Rfl: 1    diclofenac sodium (VOLTAREN) 1 % Gel, Apply 2 g topically 4 (four) times daily. Cannot take NSAIDs secondary GI upset, Disp: 300 g, Rfl: 1  No current facility-administered medications for this visit.     Facility-Administered Medications Ordered in Other Visits:     [START ON 12/14/2017] cloNIDine 80 mcg, EPINEPHrine (PF) (ADRENALIN) 0.5 mg, ketorolac (TORADOL) 30 mg, ropivacaine (PF) 5 mg/ml (0.5%) (NAROPIN) 24.6 mL in sodium chloride 0.9% 30 mL solution, , Intra-articular, Once, Daniele Starks MD    Review of patient's allergies indicates:   Allergen Reactions    Aspirin      Other reaction(s): Stomach upset  Other reaction(s): Nausea       Vitals:    12/13/17 1002   BP: 139/83   Pulse: (!) 59       Review of Systems   Musculoskeletal: Positive for arthritis, joint pain, joint swelling, myalgias and stiffness.   All other systems reviewed and are negative.                  Objective:        General: Misha is well-developed, well-nourished, appears stated age, in no acute distress, alert and oriented to time, place and person.     General    Vitals reviewed.  Constitutional: He is  oriented to person, place, and time. He appears well-developed and well-nourished. No distress.   HENT:   Head: Normocephalic and atraumatic.   Eyes: Pupils are equal, round, and reactive to light.   Cardiovascular: Normal rate.    Pulmonary/Chest: Effort normal.   Neurological: He is oriented to person, place, and time.   Psychiatric: He has a normal mood and affect. His behavior is normal. Judgment and thought content normal.     General Musculoskeletal Exam   Gait: antalgic       Right Knee Exam     Inspection   Erythema: absent  Scars: absent  Swelling: absent  Effusion: present  Deformity: deformity  Bruising: absent    Tenderness   The patient is tender to palpation of the medial joint line and medial retinaculum.    Crepitus   The patient has crepitus of the lateral joint line.    Range of Motion   Extension:  0 normal   Flexion:  120 abnormal     Tests   Meniscus   Herbert:  Medial - positive Lateral - negative  Ligament Examination Lachman: normal (-1 to 2mm)   MCL - Valgus: normal (0 to 2mm)  LCL - Varus: normal  Patella   Patellar Apprehension: negative  Passive Patellar Tilt: neutral  Patellar Tracking: normal  Patellar Glide (quadrants): Lateral - 1   Medial - 2  Q-Angle at 90 degrees: normal  Patellar Grind: negative    Other   Meniscal Cyst: absent  Popliteal (Baker's) Cyst: absent  Sensation: normal    Left Knee Exam   Left knee exam is normal.    Muscle Strength   Right Lower Extremity   Hip Abduction: 5/5   Quadriceps:  5/5   Hamstrin/5     Vascular Exam     Right Pulses  Dorsalis Pedis:      2+  Posterior Tibial:      2+        Edema  Right Lower Leg: absent    Current and previous radiographic studies and results were reviewed with the patient:   Standing views of both knees as well as lateral and sunrise views of both knees were obtained.    There is degenerative arthrosis of the medial compartment of the left knee with joint space narrowing.  There is also degenerative arthrosis of the  lateral compartment of the right knee with joint space narrowing.  The patellofemoral articulations are relatively well-maintained bilaterally.  No fracture or subluxation are identified.  There are no signs of joint effusion.   Impression      Degenerative arthrosis of both knees.       MRI:  Right knee with chondral wear; right knee medial meniscus tear      Assessment:       Encounter Diagnoses   Name Primary?    Internal derangement of right knee Yes    Acute medial meniscus tear of right knee, subsequent encounter     Acute pain of right knee     Primary osteoarthritis of both knees     S/P right knee arthroscopy     Post-op pain           Plan:         We reviewed with Misha today, the pathology and natural history of his diagnosis. We have discussed a variety of treatment options including medications, physical therapy and other alternative treatments. I also explained the indications, risks and benefits of surgery. After discussion, Misha decided to proceed with surgery. The decision was made to go forward with     Right knee arthroscopy  DOS: 12/14/2017  [facility]    The details of the surgical procedure were explained, including the location of probable incisions and a description of likely hardware and/or grafts to be used.  The patient understands the likely convalescence after surgery.  Also, we have thoroughly discussed the risks, benefits and alternatives to surgery, including, but not limited to, the risk of infection, joint stiffness, blood clot (including DVT and/or pulmonary embolus), neurologic and vascular injury.  It was explained that, if tissue has been repaired or reconstructed, there is a chance of failure, which may require further management.      All of the patient's questions were answered and informed consent was obtained. The patient will contact us if they have any questions or concerns in the interim.

## 2017-12-14 ENCOUNTER — HOSPITAL ENCOUNTER (OUTPATIENT)
Facility: HOSPITAL | Age: 62
Discharge: HOME | End: 2017-12-14
Attending: ORTHOPAEDIC SURGERY | Admitting: ORTHOPAEDIC SURGERY
Payer: COMMERCIAL

## 2017-12-14 ENCOUNTER — SURGERY (OUTPATIENT)
Age: 62
End: 2017-12-14

## 2017-12-14 ENCOUNTER — ANESTHESIA (OUTPATIENT)
Dept: SURGERY | Facility: HOSPITAL | Age: 62
End: 2017-12-14
Payer: COMMERCIAL

## 2017-12-14 DIAGNOSIS — M23.91 INTERNAL DERANGEMENT OF RIGHT KNEE: ICD-10-CM

## 2017-12-14 DIAGNOSIS — M17.0 PRIMARY OSTEOARTHRITIS OF BOTH KNEES: ICD-10-CM

## 2017-12-14 DIAGNOSIS — S83.241D ACUTE MEDIAL MENISCUS TEAR OF RIGHT KNEE, SUBSEQUENT ENCOUNTER: ICD-10-CM

## 2017-12-14 DIAGNOSIS — M25.561 ACUTE PAIN OF RIGHT KNEE: ICD-10-CM

## 2017-12-14 LAB — GLUCOSE SERPL-MCNC: 120 MG/DL (ref 70–110)

## 2017-12-14 PROCEDURE — 37000008 HC ANESTHESIA 1ST 15 MINUTES: Mod: PO | Performed by: ORTHOPAEDIC SURGERY

## 2017-12-14 PROCEDURE — 29881 ARTHRS KNE SRG MNISECTMY M/L: CPT | Mod: RT,,, | Performed by: ORTHOPAEDIC SURGERY

## 2017-12-14 PROCEDURE — 36000710: Mod: PO | Performed by: ORTHOPAEDIC SURGERY

## 2017-12-14 PROCEDURE — 36000711: Mod: PO | Performed by: ORTHOPAEDIC SURGERY

## 2017-12-14 PROCEDURE — 63600175 PHARM REV CODE 636 W HCPCS: Mod: PO | Performed by: ORTHOPAEDIC SURGERY

## 2017-12-14 PROCEDURE — D9220A PRA ANESTHESIA: Mod: CRNA,,, | Performed by: NURSE ANESTHETIST, CERTIFIED REGISTERED

## 2017-12-14 PROCEDURE — 71000033 HC RECOVERY, INTIAL HOUR: Mod: PO | Performed by: ORTHOPAEDIC SURGERY

## 2017-12-14 PROCEDURE — 27200651 HC AIRWAY, LMA: Mod: PO | Performed by: NURSE ANESTHETIST, CERTIFIED REGISTERED

## 2017-12-14 PROCEDURE — 63600175 PHARM REV CODE 636 W HCPCS: Mod: PO | Performed by: NURSE ANESTHETIST, CERTIFIED REGISTERED

## 2017-12-14 PROCEDURE — 25000003 PHARM REV CODE 250: Mod: PO | Performed by: ANESTHESIOLOGY

## 2017-12-14 PROCEDURE — 63600175 PHARM REV CODE 636 W HCPCS: Mod: PO | Performed by: ANESTHESIOLOGY

## 2017-12-14 PROCEDURE — 37000009 HC ANESTHESIA EA ADD 15 MINS: Mod: PO | Performed by: ORTHOPAEDIC SURGERY

## 2017-12-14 PROCEDURE — 27201423 OPTIME MED/SURG SUP & DEVICES STERILE SUPPLY: Mod: PO | Performed by: ORTHOPAEDIC SURGERY

## 2017-12-14 PROCEDURE — D9220A PRA ANESTHESIA: Mod: ANES,,, | Performed by: ANESTHESIOLOGY

## 2017-12-14 PROCEDURE — 71000039 HC RECOVERY, EACH ADD'L HOUR: Mod: PO | Performed by: ORTHOPAEDIC SURGERY

## 2017-12-14 PROCEDURE — 25000003 PHARM REV CODE 250: Mod: PO | Performed by: ORTHOPAEDIC SURGERY

## 2017-12-14 RX ORDER — HYDROMORPHONE HYDROCHLORIDE 2 MG/ML
0.2 INJECTION, SOLUTION INTRAMUSCULAR; INTRAVENOUS; SUBCUTANEOUS EVERY 5 MIN PRN
Status: DISCONTINUED | OUTPATIENT
Start: 2017-12-14 | End: 2017-12-14 | Stop reason: HOSPADM

## 2017-12-14 RX ORDER — MUPIROCIN 20 MG/G
OINTMENT TOPICAL
Status: DISCONTINUED | OUTPATIENT
Start: 2017-12-14 | End: 2017-12-14 | Stop reason: HOSPADM

## 2017-12-14 RX ORDER — PROPOFOL 10 MG/ML
VIAL (ML) INTRAVENOUS
Status: DISCONTINUED | OUTPATIENT
Start: 2017-12-14 | End: 2017-12-14

## 2017-12-14 RX ORDER — OXYCODONE HYDROCHLORIDE 5 MG/1
5 TABLET ORAL
Status: DISCONTINUED | OUTPATIENT
Start: 2017-12-14 | End: 2017-12-14 | Stop reason: HOSPADM

## 2017-12-14 RX ORDER — HYDROMORPHONE HYDROCHLORIDE 2 MG/ML
1 INJECTION, SOLUTION INTRAMUSCULAR; INTRAVENOUS; SUBCUTANEOUS EVERY 4 HOURS PRN
Status: DISCONTINUED | OUTPATIENT
Start: 2017-12-14 | End: 2017-12-14 | Stop reason: HOSPADM

## 2017-12-14 RX ORDER — SODIUM CHLORIDE 0.9 % (FLUSH) 0.9 %
3 SYRINGE (ML) INJECTION
Status: DISCONTINUED | OUTPATIENT
Start: 2017-12-14 | End: 2017-12-14 | Stop reason: HOSPADM

## 2017-12-14 RX ORDER — ONDANSETRON 2 MG/ML
4 INJECTION INTRAMUSCULAR; INTRAVENOUS EVERY 12 HOURS PRN
Status: DISCONTINUED | OUTPATIENT
Start: 2017-12-14 | End: 2017-12-14 | Stop reason: HOSPADM

## 2017-12-14 RX ORDER — LIDOCAINE HYDROCHLORIDE 10 MG/ML
1 INJECTION, SOLUTION EPIDURAL; INFILTRATION; INTRACAUDAL; PERINEURAL ONCE AS NEEDED
Status: DISCONTINUED | OUTPATIENT
Start: 2017-12-14 | End: 2017-12-14 | Stop reason: HOSPADM

## 2017-12-14 RX ORDER — FENTANYL CITRATE 50 UG/ML
INJECTION, SOLUTION INTRAMUSCULAR; INTRAVENOUS
Status: DISCONTINUED | OUTPATIENT
Start: 2017-12-14 | End: 2017-12-14

## 2017-12-14 RX ORDER — ONDANSETRON 2 MG/ML
INJECTION INTRAMUSCULAR; INTRAVENOUS
Status: DISCONTINUED | OUTPATIENT
Start: 2017-12-14 | End: 2017-12-14

## 2017-12-14 RX ORDER — EPINEPHRINE 1 MG/ML
INJECTION, SOLUTION INTRACARDIAC; INTRAMUSCULAR; INTRAVENOUS; SUBCUTANEOUS
Status: DISCONTINUED | OUTPATIENT
Start: 2017-12-14 | End: 2017-12-14 | Stop reason: HOSPADM

## 2017-12-14 RX ORDER — SODIUM CHLORIDE, SODIUM LACTATE, POTASSIUM CHLORIDE, CALCIUM CHLORIDE 600; 310; 30; 20 MG/100ML; MG/100ML; MG/100ML; MG/100ML
INJECTION, SOLUTION INTRAVENOUS CONTINUOUS
Status: DISCONTINUED | OUTPATIENT
Start: 2017-12-14 | End: 2017-12-14 | Stop reason: HOSPADM

## 2017-12-14 RX ORDER — DIPHENHYDRAMINE HYDROCHLORIDE 50 MG/ML
25 INJECTION INTRAMUSCULAR; INTRAVENOUS EVERY 6 HOURS PRN
Status: DISCONTINUED | OUTPATIENT
Start: 2017-12-14 | End: 2017-12-14 | Stop reason: HOSPADM

## 2017-12-14 RX ORDER — PROMETHAZINE HYDROCHLORIDE 25 MG/1
12.5 TABLET ORAL EVERY 6 HOURS PRN
Status: DISCONTINUED | OUTPATIENT
Start: 2017-12-14 | End: 2017-12-14 | Stop reason: HOSPADM

## 2017-12-14 RX ORDER — SODIUM CHLORIDE 9 MG/ML
INJECTION, SOLUTION INTRAVENOUS CONTINUOUS
Status: DISCONTINUED | OUTPATIENT
Start: 2017-12-14 | End: 2017-12-14

## 2017-12-14 RX ORDER — MEPERIDINE HYDROCHLORIDE 50 MG/ML
12.5 INJECTION INTRAMUSCULAR; INTRAVENOUS; SUBCUTANEOUS ONCE AS NEEDED
Status: DISCONTINUED | OUTPATIENT
Start: 2017-12-14 | End: 2017-12-14 | Stop reason: HOSPADM

## 2017-12-14 RX ORDER — DEXAMETHASONE SODIUM PHOSPHATE 4 MG/ML
8 INJECTION, SOLUTION INTRA-ARTICULAR; INTRALESIONAL; INTRAMUSCULAR; INTRAVENOUS; SOFT TISSUE
Status: COMPLETED | OUTPATIENT
Start: 2017-12-14 | End: 2017-12-14

## 2017-12-14 RX ORDER — LIDOCAINE HCL/PF 100 MG/5ML
SYRINGE (ML) INTRAVENOUS
Status: DISCONTINUED | OUTPATIENT
Start: 2017-12-14 | End: 2017-12-14

## 2017-12-14 RX ORDER — HYDROCODONE BITARTRATE AND ACETAMINOPHEN 5; 325 MG/1; MG/1
1 TABLET ORAL EVERY 4 HOURS PRN
Status: DISCONTINUED | OUTPATIENT
Start: 2017-12-14 | End: 2017-12-14 | Stop reason: HOSPADM

## 2017-12-14 RX ORDER — MIDAZOLAM HYDROCHLORIDE 1 MG/ML
INJECTION, SOLUTION INTRAMUSCULAR; INTRAVENOUS
Status: DISCONTINUED | OUTPATIENT
Start: 2017-12-14 | End: 2017-12-14

## 2017-12-14 RX ORDER — KETOROLAC TROMETHAMINE 30 MG/ML
INJECTION, SOLUTION INTRAMUSCULAR; INTRAVENOUS
Status: DISCONTINUED | OUTPATIENT
Start: 2017-12-14 | End: 2017-12-14

## 2017-12-14 RX ADMIN — KETOROLAC TROMETHAMINE 30 MG: 30 INJECTION, SOLUTION INTRAMUSCULAR; INTRAVENOUS at 07:12

## 2017-12-14 RX ADMIN — DEXTROSE 2 G: 50 INJECTION, SOLUTION INTRAVENOUS at 07:12

## 2017-12-14 RX ADMIN — FENTANYL CITRATE 50 MCG: 50 INJECTION, SOLUTION INTRAMUSCULAR; INTRAVENOUS at 07:12

## 2017-12-14 RX ADMIN — FENTANYL CITRATE 25 MCG: 50 INJECTION, SOLUTION INTRAMUSCULAR; INTRAVENOUS at 08:12

## 2017-12-14 RX ADMIN — PROPOFOL 150 MG: 10 INJECTION, EMULSION INTRAVENOUS at 07:12

## 2017-12-14 RX ADMIN — MIDAZOLAM HYDROCHLORIDE 2 MG: 1 INJECTION, SOLUTION INTRAMUSCULAR; INTRAVENOUS at 07:12

## 2017-12-14 RX ADMIN — EPINEPHRINE 6 ML: 1 INJECTION, SOLUTION INTRAMUSCULAR; SUBCUTANEOUS at 07:12

## 2017-12-14 RX ADMIN — MUPIROCIN: 20 OINTMENT TOPICAL at 06:12

## 2017-12-14 RX ADMIN — ONDANSETRON 4 MG: 2 INJECTION, SOLUTION INTRAMUSCULAR; INTRAVENOUS at 07:12

## 2017-12-14 RX ADMIN — OXYCODONE HYDROCHLORIDE 5 MG: 5 TABLET ORAL at 09:12

## 2017-12-14 RX ADMIN — LIDOCAINE HYDROCHLORIDE 75 MG: 20 INJECTION PARENTERAL at 07:12

## 2017-12-14 RX ADMIN — DEXAMETHASONE SODIUM PHOSPHATE 8 MG: 4 INJECTION, SOLUTION INTRAMUSCULAR; INTRAVENOUS at 06:12

## 2017-12-14 RX ADMIN — SODIUM CHLORIDE, SODIUM LACTATE, POTASSIUM CHLORIDE, CALCIUM CHLORIDE: 600; 310; 30; 20 INJECTION, SOLUTION INTRAVENOUS at 06:12

## 2017-12-14 NOTE — ANESTHESIA PREPROCEDURE EVALUATION
12/14/2017  Misha Simpson is a 62 y.o., male.    Anesthesia Evaluation    I have reviewed the Patient Summary Reports.    I have reviewed the Nursing Notes.   I have reviewed the Medications.     Review of Systems  Anesthesia Hx:  No problems with previous Anesthesia    Social:  Smoker    Cardiovascular:   Hypertension, well controlled    Pulmonary:   COPD (asymptomatic no medications), mild    Renal/:  Renal/ Normal     Hepatic/GI:   PUD,    Neurological:  Neurology Normal    Endocrine:   Diabetes, well controlled, type 2        Physical Exam  General:  Well nourished    Airway/Jaw/Neck:  Airway Findings: Mouth Opening: Normal Tongue: Normal  General Airway Assessment: Adult  Oropharynx Findings:  Mallampati: II  TM Distance: Normal, at least 6 cm  Jaw/Neck Findings:  Neck ROM: Normal ROM     Eyes/Ears/Nose:  Eyes/Ears/Nose Findings:    Dental:  Dental Findings: Edentulous   Chest/Lungs:  Chest/Lungs Findings: Normal Respiratory Rate     Heart/Vascular:  Heart Findings: Rate: Normal  Rhythm: Regular Rhythm        Mental Status:  Mental Status Findings:  Cooperative, Alert and Oriented         Anesthesia Plan  Type of Anesthesia, risks & benefits discussed:  Anesthesia Type:  general  Patient's Preference:   Intra-op Monitoring Plan:   Intra-op Monitoring Plan Comments:   Post Op Pain Control Plan: multimodal analgesia  Post Op Pain Control Plan Comments:   Induction:   IV  Beta Blocker:  Patient is not currently on a Beta-Blocker (No further documentation required).       Informed Consent: Patient understands risks and agrees with Anesthesia plan.  Questions answered. Anesthesia consent signed with patient.  ASA Score: 3     Day of Surgery Review of History & Physical:  There are no significant changes.   H&P completed by Anesthesiologist.       Ready For Surgery From Anesthesia Perspective.

## 2017-12-14 NOTE — OP NOTE
DATE OF PROCEDURE: 14 December 2017    PREOPERATIVE DIAGNOSES:   right knee medial meniscus tear.   right knee patellofemoral chondromalacia  right knee medial compartment chondromalacia  right knee synovitis    POSTOPERATIVE DIAGNOSES:   right knee medial meniscus tear.   right knee patellofemoral chondromalacia  right knee medial compartment chondromalacia  right knee synovitis    PROCEDURES:   right knee arthroscopic partial medial meniscectomy (25% resection)  right knee arthroscopic partial lateral meniscectomy inner edge only  right knee chondroplasty of patellofemoral and medial compartment(s)  right knee partial synovectomy    SURGEON: Daniele Starks M.D.     ASSISTANT: NEGRITO Winston    COMPLICATIONS: None.     POSITION: Supine     ANESTHESIA: LMA plus local.     COMPLICATIONS: None.     TOURNIQUET TIME:  None    EBL:  Minimal    SPECIMEN: none    EXAMINATION UNDER ANESTHESIA:   Knee: Range of motion 0-150; no instability; no effusion.     ARTHROSCOPIC FINDINGS:   1. Radial tear of posterior horn-body of medial meniscus   2. Inner edge fraying of lateral meniscus  3. Grade II-III Patellar chondromalacia   4. Grade III Trochlear chondromalacia  5. Grade II-III Plateau of Medial compartment chondromalacia  6. NO Lateral compartment chondromalacia  7. + Synovitis  8. NO Chondral loose bodies    IMPLANTS: None    INDICATIONS: The patient is a 62 y.o. y.o.-year-old male with a history of RIGHT knee pain that began having increased swelling and mechanical symptoms. MRI confirms a tear in hismedial meniscus as well as medial compartment chondral loss. After the risks and benefits of surgery were discussed with the patient, including bleeding, infection, scarring, persistent pain, risk of blood clots (DVT), pulmonary embolism, compartment syndrome, damage to cartilage, damage to neurovascular structures, plus the risks of anesthesia, including, death, stroke, and heart attack, the patient wished to proceed  with operative intervention.      DESCRIPTION OF PROCEDURE:     The patient and correct limb were identified and marked in the pre-op holding area. The patient was brought in the room. After undergoing General,  he was placed on a well-padded operating table. The contralateral leg was secured in place on the OR table and well padded.  his right lower extremity was prepped and draped in usual sterile fashion.     A time-out was taken properly identifying the patient, procedure to be performed; operative extremity and the receipt of pre-operative antibiotics    After infiltrating the joint and portal sites with a local anesthetic mixture, the standard inferolateral and inferomedial portals were created. Diagnostic arthroscopy performed.     The patellofemoral joint was entered. The abovementioned findings on the trochlea and the undersurface of the patella were noted.    There was moderate synovitis noted within the anterior interval as well as a medial plica that was visible. An VAPR device was used to remove areas of irritating synovium from the overlying trochlea in the anterior interval to allow for visualization and to minimize abrasion.    Attention turned to the medial compartment. The abovementioned findings on the medial femoral condyle and the tibial plateau were noted. These areas were debrided minimally. There was a radial  tear of the medial meniscus, Using combination of straight biters and arthroscopic romario, the unstable portion of the medial meniscus was trimmed down to a stable rim. Approximately 25% of the body of the medial meniscus was resected down to get this to a stable position.     Attention was turned to the intercondylar notch. The ACL and PCL were intact. There were no chondral fragments that were noted in the notch that were debrided.    Attention was turned to the lateral compartment. The lateral meniscus was significant for inner edge fraying  and the lateral femoral condyle and  lateral tibial plateau were normal.    We then returned to the patellofemoral joint and any areas of chondromalacia were debrided down to stable shoulders. The synovitic areas were further debrided.  Any loose bodies were removed as well.    Hemostasis was maintained throughout the case.      Arthroscopic photos were taken throughout the case as well. The case was concluded and the portal sites were closed with 3-0 Nylon, Xeroform, 4 x 4 fluffs, ABD pads, cast padding and thigh-high BOBBY hose.    The patient was awakened in the room, transferred to Recovery Room in stable condition.  There were no complications in the case.     I was present, scrubbed and did perform all critical portions of the case. The patient will be WBAT with a crutches and begin PT on tomorrow.      CRISTAL LAI MD

## 2017-12-14 NOTE — INTERVAL H&P NOTE
The patient has been examined and the H&P has been reviewed:    I concur with the findings and no changes have occurred since H&P was written. The patient was seen this morning. The operative extremity (right knee) was identified by the patient and initialed by an operating surgeon. There have been no changes in the patient's health since their last office visit. The patient's questions were all answered and we will proceed to the O.R.      Anesthesia/Surgery risks, benefits and alternative options discussed and understood by patient/family.          Active Hospital Problems    Diagnosis  POA    Internal derangement of right knee [M23.91]  Yes      Resolved Hospital Problems    Diagnosis Date Resolved POA   No resolved problems to display.

## 2017-12-14 NOTE — ANESTHESIA POSTPROCEDURE EVALUATION
Anesthesia Post Evaluation    Patient: Misha Simpson    Procedure(s) Performed: Procedure(s) (LRB):  ARTHROSCOPY-MENISCECTOMY (Right)  ARTHROSCOPY-KNEE W/ CHONDROPLASTY (Right)  RELEASE-LATERAL (Right)    Final Anesthesia Type: general  Patient location during evaluation: PACU  Patient participation: Yes- Able to Participate  Level of consciousness: awake and alert and oriented  Post-procedure vital signs: reviewed and stable  Pain management: adequate  Airway patency: patent  PONV status at discharge: No PONV  Anesthetic complications: no      Cardiovascular status: blood pressure returned to baseline and stable  Respiratory status: unassisted and spontaneous ventilation  Hydration status: euvolemic  Follow-up not needed.        Visit Vitals  BP (!) 168/70 (BP Location: Left arm, Patient Position: Sitting)   Pulse (!) 45   Temp 36.3 °C (97.4 °F) (Skin)   Resp 16   Ht 6' (1.829 m)   Wt 78.5 kg (173 lb)   SpO2 97%   BMI 23.46 kg/m²       Pain/Karen Score: Pain Assessment Performed: Yes (12/14/2017  8:50 AM)  Presence of Pain: denies (12/14/2017  8:50 AM)  Pain Rating Prior to Med Admin: 8 (12/14/2017  9:15 AM)  Karen Score: 8 (12/14/2017  8:50 AM)

## 2017-12-14 NOTE — BRIEF OP NOTE
[facility]  Brief Operative Note     SUMMARY     Surgery Date: 12/14/2017     Surgeon(s) and Role:     * Daniele Starks MD - Primary    Assistant: NEGRITO Winston    Pre-op Diagnosis:  Internal derangement of right knee [M23.91]  Acute medial meniscus tear of right knee, subsequent encounter [S83.241D]  Acute pain of right knee [M25.561]    Post-op Diagnosis:  Internal derangement of right knee [M23.91]  Acute medial meniscus tear of right knee, subsequent encounter [S83.241D]  Acute pain of right knee [M25.561]    Procedure(s) (LRB):  ARTHROSCOPY-MENISCECTOMY (Right)  ARTHROSCOPY-KNEE W/ CHONDROPLASTY (Right)  SYNOVECTOMY-KNEE (Right)    Anesthesia: General and local    Description of the findings of the procedure: patellofemoral and medial compartment chondromalacia; medial meniscus tear; lateral meniscus fraying    Findings/Key Components: see operative note      Estimated Blood Loss: minimal         Specimens:   Specimen (12h ago through future)    None          Discharge Note    SUMMARY     Admit Date: 12/14/2017    Discharge Date and Time: No discharge date for patient encounter.    Attending Physician: Daniele Starks MD     Discharge Provider: Daniele Starks    Final Diagnosis :Internal derangement of right knee [M23.91]  Acute medial meniscus tear of right knee, subsequent encounter [S83.241D]  Acute pain of right knee [M25.561]    Outcome of Hospitalization, Treatment, Procedure, or Surgery:  Patient was admitted for an outpatient procedure and tolerated it well without complications.    Disposition: Home or Self care    Follow Up/Patient Instructions: The patient will be discharged home after meeting all discharge criteria. The patient will resume a diet as tolerated. Please follow post-operative instructions for activity restrictions. Keep previously planned post-operative follow -up appointment.      Medications:  Reconciled Home Medications:   Current Discharge Medication  List      CONTINUE these medications which have NOT CHANGED    Details   amLODIPine (NORVASC) 5 MG tablet TAKE 1 TABLET BY MOUTH ONCE DAILY  Qty: 90 tablet, Refills: 0      benazepril (LOTENSIN) 40 MG tablet TAKE 1 TABLET BY MOUTH ONCE DAILY  Qty: 90 tablet, Refills: 0      blood sugar diagnostic (BLOOD GLUCOSE TEST) Strp Test glucose 3 x daily  Qty: 100 strip, Refills: 11      diclofenac sodium (VOLTAREN) 1 % Gel Apply 2 g topically 4 (four) times daily. Cannot take NSAIDs secondary GI upset  Qty: 300 g, Refills: 1    Associated Diagnoses: Right knee pain, unspecified chronicity; Right knee injury, initial encounter; Primary osteoarthritis of both knees; Unspecified tear of unspecified meniscus, current injury, right knee, initial encounter; Encounter related to worker's compensation claim      meloxicam (MOBIC) 7.5 MG tablet Take 1 tablet (7.5 mg total) by mouth once daily.  Qty: 30 tablet, Refills: 1      metformin (GLUCOPHAGE) 1000 MG tablet Take 1 tablet (1,000 mg total) by mouth 2 (two) times daily with meals.  Qty: 60 tablet, Refills: 5    Associated Diagnoses: Type 2 diabetes mellitus without complication, without long-term current use of insulin      oxyCODONE-acetaminophen (PERCOCET) 5-325 mg per tablet Take 1 tablet by mouth every 8 (eight) hours as needed for Pain.  Qty: 90 tablet, Refills: 0    Associated Diagnoses: Internal derangement of right knee; Acute medial meniscus tear of right knee, subsequent encounter; Acute pain of right knee; Primary osteoarthritis of both knees; S/P right knee arthroscopy; Post-op pain      ranitidine (ZANTAC) 150 MG tablet Take 1 tablet (150 mg total) by mouth once daily.  Qty: 30 tablet, Refills: 1      docusate sodium (COLACE) 100 MG capsule Take 1 capsule (100 mg total) by mouth 2 (two) times daily. Can get OTC if more cost effective  Qty: 60 capsule, Refills: 0    Associated Diagnoses: Internal derangement of right knee; Acute medial meniscus tear of right knee,  subsequent encounter; Acute pain of right knee; Primary osteoarthritis of both knees; S/P right knee arthroscopy; Post-op pain      dulaglutide (TRULICITY) 0.75 mg/0.5 mL PnIj Inject 0.5 mLs (0.75 mg total) into the skin once a week.  Qty: 4 Syringe, Refills: 6    Associated Diagnoses: Type 2 diabetes mellitus without complication, without long-term current use of insulin      hydroCHLOROthiazide (MICROZIDE) 12.5 mg capsule TAKE ONE CAPSULE BY MOUTH DAILY  Qty: 90 capsule, Refills: 0      lancets Misc As directed  Qty: 100 each, Refills: 11      nicotine (NICODERM CQ) 21 mg/24 hr Place 1 patch onto the skin once daily.  Qty: 28 patch, Refills: 0    Comments: **SMOKING CESSATION [facility] - 0.00 CO-PAY**  Associated Diagnoses: Light cigarette smoker (1-9 cigs/day)      promethazine (PHENERGAN) 25 MG tablet Take 1 tablet (25 mg total) by mouth every 6 (six) hours as needed for Nausea.  Qty: 20 tablet, Refills: 0    Associated Diagnoses: Internal derangement of right knee; Acute medial meniscus tear of right knee, subsequent encounter; Acute pain of right knee; Primary osteoarthritis of both knees; S/P right knee arthroscopy; Post-op pain           No discharge procedures on file.  Follow-up Information     Daniele Starks MD.    Specialties:  Sports Medicine, Orthopedic Surgery  Why:  f/u appointments are scheduled  Contact information:  [address] [place] [address]  [phone]

## 2017-12-14 NOTE — DISCHARGE INSTRUCTIONS
Discharge Instructions: After Your SurgerYouve just had surgery. During surgery, you were given medicine called anesthesia to keep you relaxed and free of pain. After surgery, you may have some pain or nausea. This is common. Here are some tips for feeling better and getting well after surgery.     Stay on schedule with your medicine.   Going home  Your healthcare provider will show you how to take care of yourself when you go home. He or she will also answer your questions. Have an adult family member or friend drive you home. For the first 24 hours after your surgery:  · Do not drive or use heavy equipment.  · Do not make important decisions or sign legal papers.  · Do not drink alcohol.  · Have someone stay with you, if needed. He or she can watch for problems and help keep you safe.  Be sure to go to all follow-up visits with your healthcare provider. And rest after your surgery for as long as your healthcare provider tells you to.  Coping with pain  If you have pain after surgery, pain medicine will help you feel better. Take it as told, before pain becomes severe. Also, ask your healthcare provider or pharmacist about other ways to control pain. This might be with heat, ice, or relaxation. And follow any other instructions your surgeon or nurse gives you.  Tips for taking pain medicine  To get the best relief possible, remember these points:  · Pain medicines can upset your stomach. Taking them with a little food may help.  · Most pain relievers taken by mouth need at least 20 to 30 minutes to start to work.  · Taking medicine on a schedule can help you remember to take it. Try to time your medicine so that you can take it before starting an activity. This might be before you get dressed, go for a walk, or sit down for dinner.  · Constipation is a common side effect of pain medicines. Call your healthcare provider before taking any medicines such as laxatives or stool softeners to help ease constipation.  Also ask if you should skip any foods. Drinking lots of fluids and eating foods such as fruits and vegetables that are high in fiber can also help. Remember, do not take laxatives unless your surgeon has prescribed them.  · Drinking alcohol and taking pain medicine can cause dizziness and slow your breathing. It can even be deadly. Do not drink alcohol while taking pain medicine.  · Pain medicine can make you react more slowly to things. Do not drive or run machinery while taking pain medicine.  Your healthcare provider may tell you to take acetaminophen to help ease your pain. Ask him or her how much you are supposed to take each day. Acetaminophen or other pain relievers may interact with your prescription medicines or other over-the-counter (OTC) medicines. Some prescription medicines have acetaminophen and other ingredients. Using both prescription and OTC acetaminophen for pain can cause you to overdose. Read the labels on your OTC medicines with care. This will help you to clearly know the list of ingredients, how much to take, and any warnings. It may also help you not take too much acetaminophen. If you have questions or do not understand the information, ask your pharmacist or healthcare provider to explain it to you before you take the OTC medicine.  Managing nausea  Some people have an upset stomach after surgery. This is often because of anesthesia, pain, or pain medicine, or the stress of surgery. These tips will help you handle nausea and eat healthy foods as you get better. If you were on a special food plan before surgery, ask your healthcare provider if you should follow it while you get better. These tips may help:  · Do not push yourself to eat. Your body will tell you when to eat and how much.  · Start off with clear liquids and soup. They are easier to digest.  · Next try semi-solid foods, such as mashed potatoes, applesauce, and gelatin, as you feel ready.  · Slowly move to solid foods. Dont  eat fatty, rich, or spicy foods at first.  · Do not force yourself to have 3 large meals a day. Instead eat smaller amounts more often.  · Take pain medicines with a small amount of solid food, such as crackers or toast, to avoid nausea.     Call your surgeon if  · You still have pain an hour after taking medicine. The medicine may not be strong enough.  · You feel too sleepy, dizzy, or groggy. The medicine may be too strong.  · You have side effects like nausea, vomiting, or skin changes, such as rash, itching, or hives.       If you have obstructive sleep apnea  You were given anesthesia medicine during surgery to keep you comfortable and free of pain. After surgery, you may have more apnea spells because of this medicine and other medicines you were given. The spells may last longer than usual.   At home:  · Keep using the continuous positive airway pressure (CPAP) device when you sleep. Unless your healthcare provider tells you not to, use it when you sleep, day or night. CPAP is a common device used to treat obstructive sleep apnea.  · Talk with your provider before taking any pain medicine, muscle relaxants, or sedatives. Your provider will tell you about the possible dangers of taking these medicines.  Date Last Reviewed: 12/1/2016 © 2000-2017 [employer]. [address] [place] [address], [place] [address]. All rights reserved. This information is not intended as a substitute for professional medical care. Always follow your healthcare professional's instructions.           [facility] [address], [place]                                                                                          [phone]                   Postoperative Instructions for Knee Surgery                 Your Surgery Included:   Open               Arthroscopic   [] Ligament Repair       [x] Diagnostic           [] ACL     [] PCL     [] MCL     [] PLLC      [] Synovectomy / Plica Removal [] Meniscal  Cartilage Repair / Transplantation      [] Lysis of Adhesions / Manipulation [] Articular Cartilage Repair      [] Interval Release           [] Microfracture       [] OATS   [] ACI      [x] Meniscectomy           [] Osteochondral Allograft      [] Meniscal Cartilage Repair  [] Patellar Realignment       [x] Debridement / Chondroplasty         [] Lateral Release   [] Ligament Repair      [] Articular Cartilage Repair          [] Extensor Mechanism             []   Microfracture  []  OATS         []  Cartilage Biopsy [] Tendon Repair          [] Ligament Reconstruction          [] Patella                  [] Quadriceps             []   ACL    []   PCL  [] High Tibial Osteotomy       [] PRP Arthrocentesis  [] Joint Replacement         [] Amniox Arthrocentesis           [] Unicompartmental   [] Patellofemoral                    [] Total Knee                  Call our office ([phone]) immediately if you experience any of the following:       Excessive bleeding or pus like drainage at the incision site       Uncontrollable pain not relieved by pain medication       Excessive swelling or redness at the incision site       Fever above 101.5 degrees not controlled with Tylenol or Motrin       Shortness of Breath       Any foul odor or blistering from the surgery site    FOR EMERGENCIES: If any unusual problems or difficulties occur, call our office at [phone], or page the  at [phone] who will direct your call appropriately    1.   Pain Management: A cold therapy cuff, pain medications, local injections, and in some cases, regional anesthesia injections are used to manage your post-operative pain. The decision to use each of these options is based on their risks and benefits.     Medications: You were given one or more of the following medication prescriptions before leaving the hospital. Have the prescriptions filled at a pharmacy on your way home and follow the instructions on the bottles. If  you need a refill, please call your pharmacy.      Narcotic Medication (usually Vicodin ES, Lortab, Percocet or Nucynta): Begin taking the medication before your knee starts to hurt. Some patients do not like to take any medication, but if you wait until your pain is severe before taking, you will be very uncomfortable for several hours waiting for the narcotic to work. Always take with food.     Nausea / Vomiting: For this issue, we prescribe Phenergan, use this medication as directed.     Cold Therapy: You may have been sent home with a [employer] [facility]® cold therapy unit and wrap for your knee. Fill with ice and water to the indicated fill line and use throughout the day for the first two days and then as needed to help relieve pain and control swelling.      Regional Anesthesia Injections (Blocks): You may have been given a regional nerve block either before or after surgery. This may make your entire leg numb for 24-36 hours.                            * Proceed with caution when bearing weight on your leg.     2.   Diet: Eat a bland diet for the first day after surgery. Progress your diet as tolerated. Constipation may occur with Narcotic usage, contact our office if you are experiencing constipation.    3.   Activity: Limit your activity during the first 48 hours, keep your leg elevated with pillows under your heel. After the first 48 hours at home, increase your activity level based on your symptoms.    4.   Dressing Change: Remove the dressing on the 3rd day. It is normal for some blood to be seen on the dressings. It is also normal for you to see apparent bruising on the skin around your knee when you remove the dressing. If present, leave the steri-strip tape across the incisions. If you are concerned by the drainage or the appearance of your knee, please call one of the numbers listed below.    5.   Showering: You may shower on the 3rd day after surgery with waterproof bandages over small incisions. If you  "have an incision with Prineo (clear mesh), it does not need to be covered. Do not submerge in any water until after your postoperative appointment in clinic.    6.   Your procedure did not require a post-operative brace.    7.   Your procedure did not require a Continuous Passive Motion (CPM) device.    8.   Weight Bearing: You may have been sent home with crutches. If instructed (see below), use these crutches at all times unless at complete rest.      [] Non-weight bearing for     0 weeks (you may touch your toes to the floor)      [] Partial weight bearing for  0 weeks    [] 25% Body Weight   [] 50% Body Weight      [x] Full weight bearing AS TOLERATED            [x]  NOW    []  after 0 weeks     9.  Knee Exercises: Begin these exercises the first day after surgery in order to help you regain your motion and strength. You may do the following marked exercises:     [x] Quad Sets - Begin activating your quadriceps muscle by driving your          knee downward into full knee extension while seated on a table or bed   with a towel rolled and propped under your heel     [x] Straight Leg Raise (SLR) - While [place] your quadriceps muscle, lift     your fully extended leg to the level of your non-operative knee (as shown)     [x] Heel Slides - With the knee straight, slide your heel slowly toward your   buttocks, hold at the endpoint for 10-15 seconds, then slowly straighten     [x] Ankle pumps - With your knee straight, move your ankle in a "pumping"    fashion to activate your calf and leg muscles      10.  Physical Therapy: Physical therapy is an essential component to your recovery from surgery. Your physical therapy will start in ASAP  .    FIRST POSTOPERATIVE VISIT: As scheduled.       "

## 2017-12-14 NOTE — H&P (VIEW-ONLY)
Subjective:          Chief Complaint: Misha Simpson is a 62 y.o. male who had concerns including Pain of the Right Knee.    Mr. Simpson is here for f/u after he injured his right knee when walking down the stairs at work on last Thursday. He felt a pop medially and has had pain and mechanical symptoms since then. He was here previously and received a right knee injection as was doing well until the next week.    Mr. Simpson is here for pre-op for his right knee arthroscopy.    Pain: 6/10          Pain   Associated symptoms include joint swelling and myalgias.           Past Medical History:   Diagnosis Date    Adrenal adenoma 2008    bx benign    Arthritis     Bronchiectasis 5/15/2012    Bronchiectasis 5/15/2012    Cataract     COPD, mild 10/2/2017    Diabetes mellitus, type 2     Emphysema     Hemoptysis     Hyperlipidemia     Hyperplastic colon polyp     repeat recommended 4/2013    Hypertension     Lung nodules     small stable on serial ct    Lymphocytosis     reactive    Peptic ulcer disease     Pneumonia     Seizures     Tobacco abuse 5/15/2012       Past Surgical History:   Procedure Laterality Date    APPENDECTOMY      BONE MARROW BIOPSY      COLONOSCOPY  8/6/2013            Family History   Problem Relation Age of Onset    Kidney failure Father     Diabetes Father     Heart disease Mother 60    Diabetes Brother     Cancer Brother      prostate    Glaucoma Brother      ?    Cancer Brother     Amblyopia Neg Hx     Blindness Neg Hx     Cataracts Neg Hx     Hypertension Neg Hx     Macular degeneration Neg Hx     Retinal detachment Neg Hx     Strabismus Neg Hx     Stroke Neg Hx     Thyroid disease Neg Hx          Current Outpatient Prescriptions:     amLODIPine (NORVASC) 5 MG tablet, TAKE 1 TABLET BY MOUTH ONCE DAILY, Disp: 90 tablet, Rfl: 0    benazepril (LOTENSIN) 40 MG tablet, TAKE 1 TABLET BY MOUTH ONCE DAILY, Disp: 90 tablet, Rfl: 0    blood sugar diagnostic (BLOOD  GLUCOSE TEST) Strp, Test glucose 3 x daily, Disp: 100 strip, Rfl: 11    dulaglutide (TRULICITY) 0.75 mg/0.5 mL PnIj, Inject 0.5 mLs (0.75 mg total) into the skin once a week., Disp: 4 Syringe, Rfl: 6    hydroCHLOROthiazide (MICROZIDE) 12.5 mg capsule, TAKE ONE CAPSULE BY MOUTH DAILY, Disp: 90 capsule, Rfl: 0    lancets Misc, As directed, Disp: 100 each, Rfl: 11    meloxicam (MOBIC) 7.5 MG tablet, Take 1 tablet (7.5 mg total) by mouth once daily., Disp: 30 tablet, Rfl: 1    metformin (GLUCOPHAGE) 1000 MG tablet, Take 1 tablet (1,000 mg total) by mouth 2 (two) times daily with meals., Disp: 60 tablet, Rfl: 5    nicotine (NICODERM CQ) 21 mg/24 hr, Place 1 patch onto the skin once daily., Disp: 28 patch, Rfl: 0    ranitidine (ZANTAC) 150 MG tablet, Take 1 tablet (150 mg total) by mouth once daily., Disp: 30 tablet, Rfl: 1    diclofenac sodium (VOLTAREN) 1 % Gel, Apply 2 g topically 4 (four) times daily. Cannot take NSAIDs secondary GI upset, Disp: 300 g, Rfl: 1  No current facility-administered medications for this visit.     Facility-Administered Medications Ordered in Other Visits:     [START ON 12/14/2017] cloNIDine 80 mcg, EPINEPHrine (PF) (ADRENALIN) 0.5 mg, ketorolac (TORADOL) 30 mg, ropivacaine (PF) 5 mg/ml (0.5%) (NAROPIN) 24.6 mL in sodium chloride 0.9% 30 mL solution, , Intra-articular, Once, Daniele Starks MD    Review of patient's allergies indicates:   Allergen Reactions    Aspirin      Other reaction(s): Stomach upset  Other reaction(s): Nausea       Vitals:    12/13/17 1002   BP: 139/83   Pulse: (!) 59       Review of Systems   Musculoskeletal: Positive for arthritis, joint pain, joint swelling, myalgias and stiffness.   All other systems reviewed and are negative.                  Objective:        General: Mihsa is well-developed, well-nourished, appears stated age, in no acute distress, alert and oriented to time, place and person.     General    Vitals reviewed.  Constitutional: He is  oriented to person, place, and time. He appears well-developed and well-nourished. No distress.   HENT:   Head: Normocephalic and atraumatic.   Eyes: Pupils are equal, round, and reactive to light.   Cardiovascular: Normal rate.    Pulmonary/Chest: Effort normal.   Neurological: He is oriented to person, place, and time.   Psychiatric: He has a normal mood and affect. His behavior is normal. Judgment and thought content normal.     General Musculoskeletal Exam   Gait: antalgic       Right Knee Exam     Inspection   Erythema: absent  Scars: absent  Swelling: absent  Effusion: present  Deformity: deformity  Bruising: absent    Tenderness   The patient is tender to palpation of the medial joint line and medial retinaculum.    Crepitus   The patient has crepitus of the lateral joint line.    Range of Motion   Extension:  0 normal   Flexion:  120 abnormal     Tests   Meniscus   Herbert:  Medial - positive Lateral - negative  Ligament Examination Lachman: normal (-1 to 2mm)   MCL - Valgus: normal (0 to 2mm)  LCL - Varus: normal  Patella   Patellar Apprehension: negative  Passive Patellar Tilt: neutral  Patellar Tracking: normal  Patellar Glide (quadrants): Lateral - 1   Medial - 2  Q-Angle at 90 degrees: normal  Patellar Grind: negative    Other   Meniscal Cyst: absent  Popliteal (Baker's) Cyst: absent  Sensation: normal    Left Knee Exam   Left knee exam is normal.    Muscle Strength   Right Lower Extremity   Hip Abduction: 5/5   Quadriceps:  5/5   Hamstrin/5     Vascular Exam     Right Pulses  Dorsalis Pedis:      2+  Posterior Tibial:      2+        Edema  Right Lower Leg: absent    Current and previous radiographic studies and results were reviewed with the patient:   Standing views of both knees as well as lateral and sunrise views of both knees were obtained.    There is degenerative arthrosis of the medial compartment of the left knee with joint space narrowing.  There is also degenerative arthrosis of the  lateral compartment of the right knee with joint space narrowing.  The patellofemoral articulations are relatively well-maintained bilaterally.  No fracture or subluxation are identified.  There are no signs of joint effusion.   Impression      Degenerative arthrosis of both knees.       MRI:  Right knee with chondral wear; right knee medial meniscus tear      Assessment:       Encounter Diagnoses   Name Primary?    Internal derangement of right knee Yes    Acute medial meniscus tear of right knee, subsequent encounter     Acute pain of right knee     Primary osteoarthritis of both knees     S/P right knee arthroscopy     Post-op pain           Plan:         We reviewed with Misha today, the pathology and natural history of his diagnosis. We have discussed a variety of treatment options including medications, physical therapy and other alternative treatments. I also explained the indications, risks and benefits of surgery. After discussion, Misha decided to proceed with surgery. The decision was made to go forward with     Right knee arthroscopy  DOS: 12/14/2017  [facility]    The details of the surgical procedure were explained, including the location of probable incisions and a description of likely hardware and/or grafts to be used.  The patient understands the likely convalescence after surgery.  Also, we have thoroughly discussed the risks, benefits and alternatives to surgery, including, but not limited to, the risk of infection, joint stiffness, blood clot (including DVT and/or pulmonary embolus), neurologic and vascular injury.  It was explained that, if tissue has been repaired or reconstructed, there is a chance of failure, which may require further management.      All of the patient's questions were answered and informed consent was obtained. The patient will contact us if they have any questions or concerns in the interim.

## 2017-12-14 NOTE — PLAN OF CARE
PT TO PACU S/P RT KNEE SCOPE ET AL.  SEE EPIC DETAILS FOR MONITORING AND PAIN MGT IN PACU.  CHIN LIFT REQUIRED ON ARRIVAL AND POLAR ICE DEVICE CONNECTED PER ORDER.  N/V CHECKS TO RT TRANSMETATARSAL WNL'S.  WILL MONITOR FOR ANY CHANGES

## 2017-12-14 NOTE — TRANSFER OF CARE
Anesthesia Transfer of Care Note    Patient: Misha Simpson    Procedure(s) Performed: Procedure(s) (LRB):  ARTHROSCOPY-MENISCECTOMY (Right)  ARTHROSCOPY-KNEE W/ CHONDROPLASTY (Right)  RELEASE-LATERAL (Right)    Patient location: PACU    Anesthesia Type: general    Transport from OR: Transported from OR on room air with adequate spontaneous ventilation    Post pain: adequate analgesia    Post assessment: no apparent anesthetic complications and tolerated procedure well    Post vital signs: stable    Level of consciousness: awake and alert    Nausea/Vomiting: no nausea/vomiting    Complications: none    Transfer of care protocol was followed      Last vitals:   Visit Vitals  BP (!) 146/88 (BP Location: Right arm, Patient Position: Sitting)   Pulse (!) 48   Temp 36.3 °C (97.3 °F) (Skin)   Resp 16   Ht 6' (1.829 m)   Wt 78.5 kg (173 lb)   SpO2 97%   BMI 23.46 kg/m²

## 2017-12-15 VITALS
RESPIRATION RATE: 16 BRPM | TEMPERATURE: 97 F | OXYGEN SATURATION: 97 % | HEART RATE: 45 BPM | DIASTOLIC BLOOD PRESSURE: 70 MMHG | HEIGHT: 72 IN | WEIGHT: 173 LBS | BODY MASS INDEX: 23.43 KG/M2 | SYSTOLIC BLOOD PRESSURE: 168 MMHG

## 2017-12-18 ENCOUNTER — CLINICAL SUPPORT (OUTPATIENT)
Dept: SMOKING CESSATION | Facility: CLINIC | Age: 62
End: 2017-12-18
Payer: COMMERCIAL

## 2017-12-18 DIAGNOSIS — F17.210 SMOKES 1/2 PACK A DAY OR LESS: Primary | ICD-10-CM

## 2017-12-18 PROCEDURE — 99407 BEHAV CHNG SMOKING > 10 MIN: CPT | Mod: S$GLB,,,

## 2017-12-27 ENCOUNTER — OFFICE VISIT (OUTPATIENT)
Dept: ORTHOPEDICS | Facility: CLINIC | Age: 62
End: 2017-12-27
Payer: COMMERCIAL

## 2017-12-27 VITALS
DIASTOLIC BLOOD PRESSURE: 81 MMHG | HEART RATE: 69 BPM | BODY MASS INDEX: 23.43 KG/M2 | WEIGHT: 173 LBS | HEIGHT: 72 IN | SYSTOLIC BLOOD PRESSURE: 135 MMHG

## 2017-12-27 DIAGNOSIS — G89.18 POST-OP PAIN: ICD-10-CM

## 2017-12-27 DIAGNOSIS — Z98.890 S/P RIGHT KNEE ARTHROSCOPY: Primary | ICD-10-CM

## 2017-12-27 PROBLEM — M23.91 INTERNAL DERANGEMENT OF RIGHT KNEE: Status: RESOLVED | Noted: 2017-09-19 | Resolved: 2017-12-27

## 2017-12-27 PROBLEM — M25.561 ACUTE PAIN OF RIGHT KNEE: Status: RESOLVED | Noted: 2017-09-19 | Resolved: 2017-12-27

## 2017-12-27 PROBLEM — S83.241A ACUTE MEDIAL MENISCUS TEAR OF RIGHT KNEE: Status: RESOLVED | Noted: 2017-09-19 | Resolved: 2017-12-27

## 2017-12-27 PROCEDURE — 99024 POSTOP FOLLOW-UP VISIT: CPT | Mod: S$GLB,,, | Performed by: ORTHOPAEDIC SURGERY

## 2017-12-27 PROCEDURE — 99999 PR PBB SHADOW E&M-EST. PATIENT-LVL III: CPT | Mod: PBBFAC,,, | Performed by: ORTHOPAEDIC SURGERY

## 2017-12-27 NOTE — PROGRESS NOTES
Subjective:          Chief Complaint: Misha Simpson is a 62 y.o. male who had concerns including Post-op Evaluation of the Right Knee.    Mr. Simpson is here for f/u after his right knee surgery. He is doing well with minimal pain. He has not started any therapy as of yet secondary to insurance.    Pain: 6/10    DATE OF PROCEDURE: 14 December 2017     PREOPERATIVE DIAGNOSES:   right knee medial meniscus tear.   right knee patellofemoral chondromalacia  right knee medial compartment chondromalacia  right knee synovitis     POSTOPERATIVE DIAGNOSES:   right knee medial meniscus tear.   right knee patellofemoral chondromalacia  right knee medial compartment chondromalacia  right knee synovitis     PROCEDURES:   right knee arthroscopic partial medial meniscectomy (25% resection)  right knee arthroscopic partial lateral meniscectomy inner edge only  right knee chondroplasty of patellofemoral and medial compartment(s)  right knee partial synovectomy             Past Medical History:   Diagnosis Date    Adrenal adenoma 2008    bx benign    Arthritis     Bronchiectasis 5/15/2012    Bronchiectasis 5/15/2012    Cataract     COPD, mild 10/2/2017    Diabetes mellitus, type 2     Emphysema     Hemoptysis     Hyperlipidemia     Hyperplastic colon polyp     repeat recommended 4/2013    Hypertension     Lung nodules     small stable on serial ct    Lymphocytosis     reactive    Peptic ulcer disease     Pneumonia     Tobacco abuse 5/15/2012       Past Surgical History:   Procedure Laterality Date    APPENDECTOMY      BONE MARROW BIOPSY      BRONCHOSCOPY      COLONOSCOPY  8/6/2013         KNEE ARTHROSCOPY Left     MULTIPLE TOOTH EXTRACTIONS         Family History   Problem Relation Age of Onset    Kidney failure Father     Diabetes Father     Heart disease Mother 60    Diabetes Brother     Cancer Brother      prostate    Glaucoma Brother      ?    Cancer Brother     Amblyopia Neg Hx     Blindness Neg Hx      Cataracts Neg Hx     Hypertension Neg Hx     Macular degeneration Neg Hx     Retinal detachment Neg Hx     Strabismus Neg Hx     Stroke Neg Hx     Thyroid disease Neg Hx          Current Outpatient Prescriptions:     amLODIPine (NORVASC) 5 MG tablet, TAKE 1 TABLET BY MOUTH ONCE DAILY, Disp: 90 tablet, Rfl: 0    benazepril (LOTENSIN) 40 MG tablet, TAKE 1 TABLET BY MOUTH ONCE DAILY, Disp: 90 tablet, Rfl: 0    blood sugar diagnostic (BLOOD GLUCOSE TEST) Strp, Test glucose 3 x daily, Disp: 100 strip, Rfl: 11    docusate sodium (COLACE) 100 MG capsule, Take 1 capsule (100 mg total) by mouth 2 (two) times daily. Can get OTC if more cost effective, Disp: 60 capsule, Rfl: 0    dulaglutide (TRULICITY) 0.75 mg/0.5 mL PnIj, Inject 0.5 mLs (0.75 mg total) into the skin once a week., Disp: 4 Syringe, Rfl: 6    hydroCHLOROthiazide (MICROZIDE) 12.5 mg capsule, TAKE ONE CAPSULE BY MOUTH DAILY, Disp: 90 capsule, Rfl: 0    lancets Misc, As directed, Disp: 100 each, Rfl: 11    meloxicam (MOBIC) 7.5 MG tablet, Take 1 tablet (7.5 mg total) by mouth once daily., Disp: 30 tablet, Rfl: 1    metformin (GLUCOPHAGE) 1000 MG tablet, Take 1 tablet (1,000 mg total) by mouth 2 (two) times daily with meals., Disp: 60 tablet, Rfl: 5    nicotine (NICODERM CQ) 21 mg/24 hr, Place 1 patch onto the skin once daily., Disp: 28 patch, Rfl: 0    oxyCODONE-acetaminophen (PERCOCET) 5-325 mg per tablet, Take 1 tablet by mouth every 8 (eight) hours as needed for Pain., Disp: 90 tablet, Rfl: 0    promethazine (PHENERGAN) 25 MG tablet, Take 1 tablet (25 mg total) by mouth every 6 (six) hours as needed for Nausea., Disp: 20 tablet, Rfl: 0    ranitidine (ZANTAC) 150 MG tablet, Take 1 tablet (150 mg total) by mouth once daily., Disp: 30 tablet, Rfl: 1    diclofenac sodium (VOLTAREN) 1 % Gel, Apply 2 g topically 4 (four) times daily. Cannot take NSAIDs secondary GI upset, Disp: 300 g, Rfl: 1    Review of patient's allergies indicates:    Allergen Reactions    Aspirin      Other reaction(s): Stomach upset  Other reaction(s): Nausea       Vitals:    17 0902   BP: 135/81   Pulse: 69       Review of Systems   Musculoskeletal: Positive for joint pain, muscle weakness and stiffness.   All other systems reviewed and are negative.                  Objective:        General: Misha is well-developed, well-nourished, appears stated age, in no acute distress, alert and oriented to time, place and person.     General    Vitals reviewed.  Constitutional: He is oriented to person, place, and time. He appears well-developed and well-nourished. No distress.   HENT:   Head: Normocephalic and atraumatic.   Nose: Nose normal.   Eyes: Pupils are equal, round, and reactive to light.   Cardiovascular: Normal rate.    Pulmonary/Chest: Effort normal.   Neurological: He is alert and oriented to person, place, and time.   Psychiatric: He has a normal mood and affect. His behavior is normal. Judgment and thought content normal.     General Musculoskeletal Exam   Gait: antalgic       Right Knee Exam     Inspection   Erythema: absent  Scars: present  Swelling: absent  Effusion: effusion  Deformity: deformity  Bruising: absent    Tenderness   The patient is tender to palpation of the medial joint line.    Range of Motion   Extension: 0   Flexion: 110     Tests   Ligament Examination   MCL - Valgus: normal (0 to 2mm)  LCL - Varus: normal  Patella   Patellar Apprehension: negative  Passive Patellar Tilt: neutral  Patellar Tracking: normal  Patellar Glide (quadrants): Lateral - 1   Medial - 1  Q-Angle at 90 degrees: normal  Patellar Grind: negative    Other   Sensation: normal    Comments:  Incisions are healing well    Muscle Strength   Right Lower Extremity   Hip Abduction: 5/5   Quadriceps:  5/5   Hamstrin/5     Vascular Exam     Right Pulses  Dorsalis Pedis:      2+  Posterior Tibial:      2+        Edema  Right Lower Leg: absent          Assessment:        Encounter Diagnoses   Name Primary?    S/P right knee arthroscopy Yes    Post-op pain           Plan:         Continue with pain control  Will begin therapy tomorrow  F/u in 2 weeks or sooner if needed  No work until cleared by me

## 2018-01-10 ENCOUNTER — OFFICE VISIT (OUTPATIENT)
Dept: ORTHOPEDICS | Facility: CLINIC | Age: 63
End: 2018-01-10
Payer: COMMERCIAL

## 2018-01-10 VITALS
WEIGHT: 173 LBS | DIASTOLIC BLOOD PRESSURE: 81 MMHG | SYSTOLIC BLOOD PRESSURE: 125 MMHG | HEART RATE: 62 BPM | BODY MASS INDEX: 23.43 KG/M2 | HEIGHT: 72 IN

## 2018-01-10 DIAGNOSIS — M17.0 PRIMARY OSTEOARTHRITIS OF BOTH KNEES: ICD-10-CM

## 2018-01-10 DIAGNOSIS — Z98.890 S/P RIGHT KNEE ARTHROSCOPY: Primary | ICD-10-CM

## 2018-01-10 DIAGNOSIS — G89.18 POST-OP PAIN: ICD-10-CM

## 2018-01-10 PROCEDURE — 99024 POSTOP FOLLOW-UP VISIT: CPT | Mod: S$GLB,,, | Performed by: ORTHOPAEDIC SURGERY

## 2018-01-10 PROCEDURE — 99999 PR PBB SHADOW E&M-EST. PATIENT-LVL III: CPT | Mod: PBBFAC,,, | Performed by: ORTHOPAEDIC SURGERY

## 2018-01-10 RX ORDER — MELOXICAM 7.5 MG/1
7.5 TABLET ORAL DAILY
Qty: 30 TABLET | Refills: 1 | Status: SHIPPED | OUTPATIENT
Start: 2018-01-10 | End: 2018-08-06 | Stop reason: SDUPTHER

## 2018-01-10 RX ORDER — TRAMADOL HYDROCHLORIDE 50 MG/1
50 TABLET ORAL EVERY 8 HOURS PRN
Qty: 60 TABLET | Refills: 0 | Status: SHIPPED | OUTPATIENT
Start: 2018-01-10 | End: 2018-01-20

## 2018-01-10 NOTE — PROGRESS NOTES
Subjective:          Chief Complaint: Misha Simpson is a 62 y.o. male who had concerns including Pain of the Right Knee.    Mr. Simpson is here for f/u after his right knee surgery. He is doing well with minimal pain. He is in therapy at [facility] and is progressing but still having pain with increased ambulation.    Pain: 7/10    DATE OF PROCEDURE: 14 December 2017     PREOPERATIVE DIAGNOSES:   right knee medial meniscus tear.   right knee patellofemoral chondromalacia  right knee medial compartment chondromalacia  right knee synovitis     POSTOPERATIVE DIAGNOSES:   right knee medial meniscus tear.   right knee patellofemoral chondromalacia  right knee medial compartment chondromalacia  right knee synovitis     PROCEDURES:   right knee arthroscopic partial medial meniscectomy (25% resection)  right knee arthroscopic partial lateral meniscectomy inner edge only  right knee chondroplasty of patellofemoral and medial compartment(s)  right knee partial synovectomy         Pain   Pertinent negatives include no joint swelling. He has tried ice and rest for the symptoms. The treatment provided mild relief.         Past Medical History:   Diagnosis Date    Adrenal adenoma 2008    bx benign    Arthritis     Bronchiectasis 5/15/2012    Bronchiectasis 5/15/2012    Cataract     COPD, mild 10/2/2017    Diabetes mellitus, type 2     Emphysema     Hemoptysis     Hyperlipidemia     Hyperplastic colon polyp     repeat recommended 4/2013    Hypertension     Lung nodules     small stable on serial ct    Lymphocytosis     reactive    Peptic ulcer disease     Pneumonia     Tobacco abuse 5/15/2012       Past Surgical History:   Procedure Laterality Date    APPENDECTOMY      BONE MARROW BIOPSY      BRONCHOSCOPY      COLONOSCOPY  8/6/2013         KNEE ARTHROSCOPY Left     MULTIPLE TOOTH EXTRACTIONS         Family History   Problem Relation Age of Onset    Kidney failure Father     Diabetes Father     Heart disease Mother  60    Diabetes Brother     Cancer Brother      prostate    Glaucoma Brother      ?    Cancer Brother     Amblyopia Neg Hx     Blindness Neg Hx     Cataracts Neg Hx     Hypertension Neg Hx     Macular degeneration Neg Hx     Retinal detachment Neg Hx     Strabismus Neg Hx     Stroke Neg Hx     Thyroid disease Neg Hx          Current Outpatient Prescriptions:     amLODIPine (NORVASC) 5 MG tablet, TAKE 1 TABLET BY MOUTH ONCE DAILY, Disp: 90 tablet, Rfl: 0    benazepril (LOTENSIN) 40 MG tablet, TAKE 1 TABLET BY MOUTH ONCE DAILY, Disp: 90 tablet, Rfl: 0    blood sugar diagnostic (BLOOD GLUCOSE TEST) Strp, Test glucose 3 x daily, Disp: 100 strip, Rfl: 11    docusate sodium (COLACE) 100 MG capsule, Take 1 capsule (100 mg total) by mouth 2 (two) times daily. Can get OTC if more cost effective, Disp: 60 capsule, Rfl: 0    dulaglutide (TRULICITY) 0.75 mg/0.5 mL PnIj, Inject 0.5 mLs (0.75 mg total) into the skin once a week., Disp: 4 Syringe, Rfl: 6    hydroCHLOROthiazide (MICROZIDE) 12.5 mg capsule, TAKE ONE CAPSULE BY MOUTH DAILY, Disp: 90 capsule, Rfl: 0    lancets Misc, As directed, Disp: 100 each, Rfl: 11    meloxicam (MOBIC) 7.5 MG tablet, Take 1 tablet (7.5 mg total) by mouth once daily., Disp: 30 tablet, Rfl: 1    metformin (GLUCOPHAGE) 1000 MG tablet, Take 1 tablet (1,000 mg total) by mouth 2 (two) times daily with meals., Disp: 60 tablet, Rfl: 5    nicotine (NICODERM CQ) 21 mg/24 hr, Place 1 patch onto the skin once daily., Disp: 28 patch, Rfl: 0    oxyCODONE-acetaminophen (PERCOCET) 5-325 mg per tablet, Take 1 tablet by mouth every 8 (eight) hours as needed for Pain., Disp: 90 tablet, Rfl: 0    promethazine (PHENERGAN) 25 MG tablet, Take 1 tablet (25 mg total) by mouth every 6 (six) hours as needed for Nausea., Disp: 20 tablet, Rfl: 0    ranitidine (ZANTAC) 150 MG tablet, Take 1 tablet (150 mg total) by mouth once daily., Disp: 30 tablet, Rfl: 1    diclofenac sodium (VOLTAREN) 1 % Gel,  Apply 2 g topically 4 (four) times daily. Cannot take NSAIDs secondary GI upset, Disp: 300 g, Rfl: 1    Review of patient's allergies indicates:   Allergen Reactions    Aspirin      Other reaction(s): Stomach upset  Other reaction(s): Nausea       Vitals:    01/10/18 0857   BP: 125/81   Pulse: 62       Review of Systems   Musculoskeletal: Positive for joint pain and muscle weakness. Negative for joint swelling and stiffness.   All other systems reviewed and are negative.                  Objective:        General: Misha is well-developed, well-nourished, appears stated age, in no acute distress, alert and oriented to time, place and person.     General    Vitals reviewed.  Constitutional: He is oriented to person, place, and time. He appears well-developed and well-nourished. No distress.   HENT:   Head: Normocephalic and atraumatic.   Nose: Nose normal.   Eyes: Pupils are equal, round, and reactive to light.   Cardiovascular: Normal rate.    Pulmonary/Chest: Effort normal.   Neurological: He is alert and oriented to person, place, and time.   Psychiatric: He has a normal mood and affect. His behavior is normal. Judgment and thought content normal.     General Musculoskeletal Exam   Gait: antalgic       Right Knee Exam     Inspection   Erythema: absent  Scars: present  Swelling: absent  Effusion: effusion  Deformity: deformity  Bruising: absent    Tenderness   The patient is tender to palpation of the patella.    Range of Motion   Extension: 0   Flexion: 130     Tests   Ligament Examination   MCL - Valgus: normal (0 to 2mm)  LCL - Varus: normal  Patella   Patellar Apprehension: negative  Passive Patellar Tilt: neutral  Patellar Tracking: normal  Patellar Glide (quadrants): Lateral - 1   Medial - 1  Q-Angle at 90 degrees: normal  Patellar Grind: positive    Other   Sensation: normal    Comments:  Incisions are healed    + portal pain    Left Knee Exam     Range of Motion   Flexion: 150     Muscle Strength   Right  Lower Extremity   Hip Abduction: 5/5   Quadriceps:  5/5   Hamstrin/5     Vascular Exam     Right Pulses  Dorsalis Pedis:      2+  Posterior Tibial:      2+        Edema  Right Lower Leg: absent          Assessment:       Encounter Diagnoses   Name Primary?    S/P right knee arthroscopy Yes    Post-op pain           Plan:         Continue with pain control and will change to Tramadol  Will continue with therapy  F/u in 2 weeks or sooner if needed  No work until cleared by me

## 2018-01-24 ENCOUNTER — OFFICE VISIT (OUTPATIENT)
Dept: ORTHOPEDICS | Facility: CLINIC | Age: 63
End: 2018-01-24
Payer: COMMERCIAL

## 2018-01-24 VITALS
SYSTOLIC BLOOD PRESSURE: 147 MMHG | BODY MASS INDEX: 23.43 KG/M2 | WEIGHT: 173 LBS | HEIGHT: 72 IN | HEART RATE: 79 BPM | DIASTOLIC BLOOD PRESSURE: 83 MMHG

## 2018-01-24 DIAGNOSIS — M17.0 PRIMARY OSTEOARTHRITIS OF BOTH KNEES: ICD-10-CM

## 2018-01-24 DIAGNOSIS — G89.18 POST-OP PAIN: ICD-10-CM

## 2018-01-24 DIAGNOSIS — Z98.890 S/P RIGHT KNEE ARTHROSCOPY: Primary | ICD-10-CM

## 2018-01-24 DIAGNOSIS — M21.161 ACQUIRED VARUS DEFORMITY KNEE, RIGHT: ICD-10-CM

## 2018-01-24 PROCEDURE — 99999 PR PBB SHADOW E&M-EST. PATIENT-LVL III: CPT | Mod: PBBFAC,,, | Performed by: ORTHOPAEDIC SURGERY

## 2018-01-24 PROCEDURE — 99024 POSTOP FOLLOW-UP VISIT: CPT | Mod: S$GLB,,, | Performed by: ORTHOPAEDIC SURGERY

## 2018-01-24 NOTE — PROGRESS NOTES
Subjective:          Chief Complaint: Misha Simpson is a 62 y.o. male who had concerns including Post-op Evaluation of the Right Knee.    Mr. Simpson is here for f/u after his right knee surgery. He is in therapy at [facility] and is progressing but still having pain with increased ambulation.    Pain: 7/10    DATE OF PROCEDURE: 14 December 2017     PREOPERATIVE DIAGNOSES:   right knee medial meniscus tear.   right knee patellofemoral chondromalacia  right knee medial compartment chondromalacia  right knee synovitis     POSTOPERATIVE DIAGNOSES:   right knee medial meniscus tear.   right knee patellofemoral chondromalacia  right knee medial compartment chondromalacia  right knee synovitis     PROCEDURES:   right knee arthroscopic partial medial meniscectomy (25% resection)  right knee arthroscopic partial lateral meniscectomy inner edge only  right knee chondroplasty of patellofemoral and medial compartment(s)  right knee partial synovectomy         Pain   Pertinent negatives include no joint swelling. He has tried ice and rest for the symptoms. The treatment provided mild relief.         Past Medical History:   Diagnosis Date    Adrenal adenoma 2008    bx benign    Arthritis     Bronchiectasis 5/15/2012    Bronchiectasis 5/15/2012    Cataract     COPD, mild 10/2/2017    Diabetes mellitus, type 2     Emphysema     Hemoptysis     Hyperlipidemia     Hyperplastic colon polyp     repeat recommended 4/2013    Hypertension     Lung nodules     small stable on serial ct    Lymphocytosis     reactive    Peptic ulcer disease     Pneumonia     Tobacco abuse 5/15/2012       Past Surgical History:   Procedure Laterality Date    APPENDECTOMY      BONE MARROW BIOPSY      BRONCHOSCOPY      COLONOSCOPY  8/6/2013         KNEE ARTHROSCOPY Left     MULTIPLE TOOTH EXTRACTIONS         Family History   Problem Relation Age of Onset    Kidney failure Father     Diabetes Father     Heart disease Mother 60    Diabetes  Brother     Cancer Brother      prostate    Glaucoma Brother      ?    Cancer Brother     Amblyopia Neg Hx     Blindness Neg Hx     Cataracts Neg Hx     Hypertension Neg Hx     Macular degeneration Neg Hx     Retinal detachment Neg Hx     Strabismus Neg Hx     Stroke Neg Hx     Thyroid disease Neg Hx          Current Outpatient Prescriptions:     amLODIPine (NORVASC) 5 MG tablet, TAKE 1 TABLET BY MOUTH ONCE DAILY, Disp: 90 tablet, Rfl: 0    benazepril (LOTENSIN) 40 MG tablet, TAKE 1 TABLET BY MOUTH ONCE DAILY, Disp: 90 tablet, Rfl: 0    blood sugar diagnostic (BLOOD GLUCOSE TEST) Strp, Test glucose 3 x daily, Disp: 100 strip, Rfl: 11    docusate sodium (COLACE) 100 MG capsule, Take 1 capsule (100 mg total) by mouth 2 (two) times daily. Can get OTC if more cost effective, Disp: 60 capsule, Rfl: 0    dulaglutide (TRULICITY) 0.75 mg/0.5 mL PnIj, Inject 0.5 mLs (0.75 mg total) into the skin once a week., Disp: 4 Syringe, Rfl: 6    hydroCHLOROthiazide (MICROZIDE) 12.5 mg capsule, TAKE ONE CAPSULE BY MOUTH DAILY, Disp: 90 capsule, Rfl: 0    lancets Misc, As directed, Disp: 100 each, Rfl: 11    meloxicam (MOBIC) 7.5 MG tablet, Take 1 tablet (7.5 mg total) by mouth once daily., Disp: 30 tablet, Rfl: 1    metformin (GLUCOPHAGE) 1000 MG tablet, Take 1 tablet (1,000 mg total) by mouth 2 (two) times daily with meals., Disp: 60 tablet, Rfl: 5    nicotine (NICODERM CQ) 21 mg/24 hr, Place 1 patch onto the skin once daily., Disp: 28 patch, Rfl: 0    oxyCODONE-acetaminophen (PERCOCET) 5-325 mg per tablet, Take 1 tablet by mouth every 8 (eight) hours as needed for Pain., Disp: 90 tablet, Rfl: 0    promethazine (PHENERGAN) 25 MG tablet, Take 1 tablet (25 mg total) by mouth every 6 (six) hours as needed for Nausea., Disp: 20 tablet, Rfl: 0    ranitidine (ZANTAC) 150 MG tablet, Take 1 tablet (150 mg total) by mouth once daily., Disp: 30 tablet, Rfl: 1    diclofenac sodium (VOLTAREN) 1 % Gel, Apply 2 g topically  4 (four) times daily. Cannot take NSAIDs secondary GI upset, Disp: 300 g, Rfl: 1    Review of patient's allergies indicates:   Allergen Reactions    Aspirin      Other reaction(s): Stomach upset  Other reaction(s): Nausea       Vitals:    01/24/18 0818   BP: (!) 147/83   Pulse: 79       Review of Systems   Musculoskeletal: Positive for joint pain and muscle weakness. Negative for joint swelling and stiffness.   All other systems reviewed and are negative.                  Objective:        General: Misha is well-developed, well-nourished, appears stated age, in no acute distress, alert and oriented to time, place and person.     General    Vitals reviewed.  Constitutional: He is oriented to person, place, and time. He appears well-developed and well-nourished. No distress.   HENT:   Head: Normocephalic and atraumatic.   Nose: Nose normal.   Eyes: Pupils are equal, round, and reactive to light.   Cardiovascular: Normal rate.    Pulmonary/Chest: Effort normal.   Neurological: He is alert and oriented to person, place, and time.   Psychiatric: He has a normal mood and affect. His behavior is normal. Judgment and thought content normal.     General Musculoskeletal Exam   Gait: antalgic       Right Knee Exam     Inspection   Erythema: absent  Scars: present  Swelling: absent  Effusion: effusion  Deformity: deformity  Bruising: absent    Tenderness   The patient is tender to palpation of the lateral retinaculum.    Range of Motion   Extension: 0   Flexion: 130     Tests   Ligament Examination   MCL - Valgus: abnormal - grade I  LCL - Varus: abnormal - grade I  Patella   Patellar Apprehension: negative  Passive Patellar Tilt: neutral  Patellar Tracking: normal  Patellar Glide (quadrants): Lateral - 1   Medial - 1  Q-Angle at 90 degrees: normal  Patellar Grind: positive    Other   Sensation: normal    Comments:  Incisions are healed    + portal pain laterally     Left Knee Exam     Range of Motion   Flexion: 150      Muscle Strength   Right Lower Extremity   Hip Abduction: 5/5   Quadriceps:  5/5   Hamstrin/5     Vascular Exam     Right Pulses  Dorsalis Pedis:      2+  Posterior Tibial:      2+        Edema  Right Lower Leg: absent          Assessment:       Encounter Diagnoses   Name Primary?    S/P right knee arthroscopy Yes    Post-op pain     Primary osteoarthritis of both knees     Acquired varus deformity knee, right           Plan:         Continue with pain control: Tramadol  Will continue with therapy  F/u in 4 weeks or sooner if needed  Mr. Simpson primarily has pain when he is ambulating. His pain is on the medial aspect of the knee with correlates with the increased wear in the medial compartment (as seen on the x-rays) and the varus/valgus  deformity (as seen on his x-rays). The patient is not unstable when they ambulate and has not had a recent injury, but has had surgery on the knee. In order for the patient to become more effective when they ambulate and treat their symptoms in the best way possible, I believe that they are a great candidate for  bracing. The deformity is correctable on exam with passive stress. This medial  brace will unload the painful side of the knee and allow for less painful ambulation and hopefully allow the patient to ambulate for longer distances and for recreational activities. All of which should improve their current quality of life.  No work until cleared until brace is fit and physician releases (will need to f/u with Dr. May in the future)

## 2018-02-12 ENCOUNTER — OFFICE VISIT (OUTPATIENT)
Dept: ORTHOPEDICS | Facility: CLINIC | Age: 63
End: 2018-02-12
Payer: COMMERCIAL

## 2018-02-12 VITALS — HEIGHT: 72 IN | BODY MASS INDEX: 23.43 KG/M2 | WEIGHT: 173 LBS

## 2018-02-12 DIAGNOSIS — Z98.890 S/P RIGHT KNEE ARTHROSCOPY: Primary | ICD-10-CM

## 2018-02-12 PROCEDURE — 99999 PR PBB SHADOW E&M-EST. PATIENT-LVL II: CPT | Mod: PBBFAC,,, | Performed by: ORTHOPAEDIC SURGERY

## 2018-02-12 PROCEDURE — 99024 POSTOP FOLLOW-UP VISIT: CPT | Mod: S$GLB,,, | Performed by: ORTHOPAEDIC SURGERY

## 2018-02-12 NOTE — PROGRESS NOTES
DATE: 2/12/2018  PATIENT: Misha Simpson    Attending Physician: Tarun May M.D.    HISTORY:  Misha Simpson is a 62 y.o. male who returns for follow up evaluation of  his right knee.  He is status post arthroscopy with partial medial lateral meniscectomies and chondroplasty by Dr. Starks on 12/14/17.  He has been out of work since his surgery.  He has been known therapy.  He feels like he is making progress.  Pain is reported at 6/10.  Overall he feels he is 60% improved from preoperatively.    PMH/PSH/FamHx/SocHx:  Reviewed and unchanged from prior visit    ROS:  Constitution: Negative for chills, fever, and sweats. Negative for unexplained weight loss.  HENT: Negative for headaches and blurry vision.   Cardiovascular: Negative for chest pain, irregular heartbeat, leg swelling and palpitations.   Respiratory: Negative for cough and shortness of breath.   Gastrointestinal: Negative for abdominal pain, heartburn, nausea and vomiting.   Genitourinary: Negative for bladder incontinence and dysuria.   Musculoskeletal: Negative for systemic arthritis, joint swelling, muscle weakness and myalgias.   Neurological: Negative for numbness.   Psychiatric/Behavioral: Negative for depression.   Endocrine: Negative for polyuria.   Hematologic/Lymphatic: Negative for bleeding disorders.  Skin: Negative for poor wound healing.       EXAM:  Ht 6' (1.829 m)   Wt 78.5 kg (173 lb)   BMI 23.46 kg/m²   Healthy-appearing male in no acute distress.  Examination right knee reveals the R scopic portals are clean and dry and healing nicely.  No effusion.  Range of motion 0-30° of flexion.  No instability on exam.  Sensation intact to lower extremity.    IMAGING:   No x-rays are performed today.    ASSESSMENT:  Status post arthroscopy right knee with partial medial lateral meniscectomies and chondroplasty, 12/14/17 (Dr. Starks)    PLAN:  The implications of the patient's evolution of symptoms and findings were explained to the  patient . Aand his wife in detail.  Misha is doing well postoperatively.  We'll continue with therapy.  He'll remain out of work for 4 more weeks and then return to work full duty.  I will see him back in 6 weeks' time for reexam should she remain symptomatic.          This note was dictated using voice recognition software. Please excuse any grammatical or typographical errors.

## 2018-02-21 ENCOUNTER — OFFICE VISIT (OUTPATIENT)
Dept: ORTHOPEDICS | Facility: CLINIC | Age: 63
End: 2018-02-21
Payer: COMMERCIAL

## 2018-02-21 VITALS — WEIGHT: 173 LBS | HEIGHT: 72 IN | BODY MASS INDEX: 23.43 KG/M2

## 2018-02-21 DIAGNOSIS — Z98.890 S/P RIGHT KNEE ARTHROSCOPY: Primary | ICD-10-CM

## 2018-02-21 PROCEDURE — 99999 PR PBB SHADOW E&M-EST. PATIENT-LVL II: CPT | Mod: PBBFAC,,, | Performed by: ORTHOPAEDIC SURGERY

## 2018-02-21 PROCEDURE — 3008F BODY MASS INDEX DOCD: CPT | Mod: S$GLB,,, | Performed by: ORTHOPAEDIC SURGERY

## 2018-02-21 PROCEDURE — 99024 POSTOP FOLLOW-UP VISIT: CPT | Mod: S$GLB,,, | Performed by: ORTHOPAEDIC SURGERY

## 2018-02-21 NOTE — PROGRESS NOTES
DATE: 2/21/2018  PATIENT: Misha Simpson    Attending Physician: Tarun May M.D.    HISTORY:  Misha Simpson is a 62 y.o. male who returns for follow up evaluation of  his right knee.  He is status post partial mediolateral meniscectomies and chondroplasty, 12/14/70 by Dr. Starks.  Seen last week and reports he continues to do well.  He like to discuss his return to work status.  Pain is still reported at 7/10 today.    PMH/PSH/FamHx/SocHx:  Reviewed and unchanged from prior visit    ROS:  Constitution: Negative for chills, fever, and sweats. Negative for unexplained weight loss.  HENT: Negative for headaches and blurry vision.   Cardiovascular: Negative for chest pain, irregular heartbeat, leg swelling and palpitations.   Respiratory: Negative for cough and shortness of breath.   Gastrointestinal: Negative for abdominal pain, heartburn, nausea and vomiting.   Genitourinary: Negative for bladder incontinence and dysuria.   Musculoskeletal: Negative for systemic arthritis, joint swelling, muscle weakness and myalgias.   Neurological: Negative for numbness.   Psychiatric/Behavioral: Negative for depression.   Endocrine: Negative for polyuria.   Hematologic/Lymphatic: Negative for bleeding disorders.  Skin: Negative for poor wound healing.       EXAM:  Ht 6' (1.829 m)   Wt 78.5 kg (173 lb)   BMI 23.46 kg/m²   Healthy-appearing male in no acute distress.  Examination right knee reveals the arthroscopic portals are healing nicely.  No effusion.  Range of motion 0-130° of flexion.  No instability on exam.  Sensation intact to lower extremity.    IMAGING:   No x-rays are performed today.    ASSESSMENT:  Status post arthroscopy right knee with partial medial lateral meniscectomies and chondroplasty, 12/14/17 (Dr. Starks)    PLAN:  The implications of the patient's evolution of symptoms and findings were explained to the patient in detail.  As discussed last week explained to Misha that he is doing  satisfactorily.  He'll continue with therapy and completed his course.  He may return to work full duty effective 3/19/18.  I will see him back weeks after he is been working full duty 2 make sure he continues to do well.  Follow-up in 6 weeks' time for reexam.        This note was dictated using voice recognition software. Please excuse any grammatical or typographical errors.

## 2018-02-27 ENCOUNTER — TELEPHONE (OUTPATIENT)
Dept: ORTHOPEDICS | Facility: CLINIC | Age: 63
End: 2018-02-27

## 2018-02-27 NOTE — TELEPHONE ENCOUNTER
----- Message from Usha Mathews sent at 2/27/2018  9:49 AM CST -----  Contact: self [phone]  Pt would like you to call him.  He needs to discuss something with you from his last visit in [place]. Please advise

## 2018-02-27 NOTE — TELEPHONE ENCOUNTER
Contacted pt. Pt states Usha has paperwork for Dr May to fill out at [facility] for pt to be released to full duty work. Advised I would notify Dr May while at [place] location 2/28/18. Pt verbalized understanding.

## 2018-02-28 NOTE — TELEPHONE ENCOUNTER
Per Dr May pt will be released for full duty work on 3/19/18. Notified Usha with workers comp. Usah states she will notify pt.

## 2018-03-06 ENCOUNTER — LAB VISIT (OUTPATIENT)
Dept: LAB | Facility: HOSPITAL | Age: 63
End: 2018-03-06
Attending: FAMILY MEDICINE
Payer: COMMERCIAL

## 2018-03-06 DIAGNOSIS — E11.9 TYPE 2 DIABETES MELLITUS WITHOUT COMPLICATION, WITHOUT LONG-TERM CURRENT USE OF INSULIN: ICD-10-CM

## 2018-03-06 LAB
CREAT UR-MCNC: 190 MG/DL
MICROALBUMIN UR DL<=1MG/L-MCNC: 8 UG/ML
MICROALBUMIN/CREATININE RATIO: 4.2 UG/MG

## 2018-03-06 PROCEDURE — 82043 UR ALBUMIN QUANTITATIVE: CPT

## 2018-03-07 ENCOUNTER — TELEPHONE (OUTPATIENT)
Dept: ORTHOPEDICS | Facility: CLINIC | Age: 63
End: 2018-03-07

## 2018-03-07 NOTE — TELEPHONE ENCOUNTER
----- Message from Usha Mathews sent at 3/7/2018  9:29 AM CST -----  Contact: Mitch Mcclellan [phone]  Would like to schedule a rehab confrence  With you after April 4th on the patient please advise

## 2018-03-07 NOTE — TELEPHONE ENCOUNTER
----- Message from Usha Mathews sent at 3/7/2018  9:29 AM CST -----  Contact: Mitch Mcclellan [phone]  Would like to schedule a rehab confrence  With you after April 4th on the patient please advise

## 2018-03-14 ENCOUNTER — OFFICE VISIT (OUTPATIENT)
Dept: ORTHOPEDICS | Facility: CLINIC | Age: 63
End: 2018-03-14
Payer: COMMERCIAL

## 2018-03-14 VITALS — BODY MASS INDEX: 23.43 KG/M2 | WEIGHT: 173 LBS | HEIGHT: 72 IN

## 2018-03-14 DIAGNOSIS — Z98.890 S/P RIGHT KNEE ARTHROSCOPY: ICD-10-CM

## 2018-03-14 DIAGNOSIS — M25.561 RIGHT KNEE PAIN, UNSPECIFIED CHRONICITY: Primary | ICD-10-CM

## 2018-03-14 PROCEDURE — 99024 POSTOP FOLLOW-UP VISIT: CPT | Mod: S$GLB,,, | Performed by: ORTHOPAEDIC SURGERY

## 2018-03-14 PROCEDURE — 99999 PR PBB SHADOW E&M-EST. PATIENT-LVL II: CPT | Mod: PBBFAC,,, | Performed by: ORTHOPAEDIC SURGERY

## 2018-03-14 RX ORDER — TRAMADOL HYDROCHLORIDE 50 MG/1
50 TABLET ORAL
Qty: 30 TABLET | Refills: 0 | Status: SHIPPED | OUTPATIENT
Start: 2018-03-14 | End: 2018-03-24

## 2018-03-14 RX ORDER — HYDROCHLOROTHIAZIDE 12.5 MG/1
CAPSULE ORAL
Qty: 90 CAPSULE | Refills: 1 | Status: SHIPPED | OUTPATIENT
Start: 2018-03-14 | End: 2018-12-26 | Stop reason: SDUPTHER

## 2018-03-14 RX ORDER — BENAZEPRIL HYDROCHLORIDE 40 MG/1
TABLET ORAL
Qty: 90 TABLET | Refills: 1 | Status: SHIPPED | OUTPATIENT
Start: 2018-03-14 | End: 2019-01-04 | Stop reason: SDUPTHER

## 2018-03-14 NOTE — TELEPHONE ENCOUNTER
Spoke to Usha chiu AM who will contact med/legal to advise on fee schedule to see if they still want to schedule the rehab conference.

## 2018-03-14 NOTE — PROGRESS NOTES
DATE: 3/14/2018  PATIENT: Misha Simpson    Attending Physician: Tarun May M.D.    HISTORY:  Misha Simpson is a 62 y.o. male who returns for follow up evaluation of  his right knee.  He is status post arthroscopy with partial medial meniscectomy, partial lateral meniscectomy and chondroplasty 12/14/17 by Dr. Starks.  He reports that he still has discomfort.  He is taking the tramadol request a refill.  He noted pain after mowing his yard last weekend.  Pain is reported at 6/10 today.    PMH/PSH/FamHx/SocHx:  Reviewed and unchanged from prior visit    ROS:  Constitution: Negative for chills, fever, and sweats. Negative for unexplained weight loss.  HENT: Negative for headaches and blurry vision.   Cardiovascular: Negative for chest pain, irregular heartbeat, leg swelling and palpitations.   Respiratory: Negative for cough and shortness of breath.   Gastrointestinal: Negative for abdominal pain, heartburn, nausea and vomiting.   Genitourinary: Negative for bladder incontinence and dysuria.   Musculoskeletal: Negative for systemic arthritis, joint swelling, muscle weakness and myalgias.   Neurological: Negative for numbness.   Psychiatric/Behavioral: Negative for depression.   Endocrine: Negative for polyuria.   Hematologic/Lymphatic: Negative for bleeding disorders.  Skin: Negative for poor wound healing.       EXAM:  Ht 6' (1.829 m)   Wt 78.5 kg (173 lb)   BMI 23.46 kg/m²   Misha Simpson is a well developed, well nourished male in no acute distress. Physical examination of the right knee evaluated the following:    Gait and Alignment  Inspection for ecchymosis, swelling, atrophy, or deformity  Inspection for intra-articular and/or bursal effusions  Tenderness to palpation over the bony and soft tissue structures around the knee  Range of Motion and presence of extensor lag/contractures  Sensation and motor strength  Varus/valgus or anterior/posterior/rotatory instability  Flexion pinch and Herbert's  Tests  Patellar alignment/tracking/pain to palpation  Vascular exam to include skin temperature/color/capillary refill    Remarkable findings included:  Well-healed arthroscopic portals.  No effusion of the knee.  Mild medial lateral tenderness.  Range of motion 0-130° of flexion.  Negative flexion pinch.  Negative Herbert's      IMAGING:   No x-rays are performed today.    ASSESSMENT:  Status post arthroscopy right knee with partial medial lateral meniscectomies and chondroplasty, 12/14/17 (Dr. Starks)    PLAN:  The implications of the patient's evolution of symptoms and findings were explained to the patient in detail.  Although Misha has some complaints, explained that overall is doing fairly well.  He is released to return to work full duty effective 3/19/18.  Monitor symptoms.  Tramadol refilled today.  I will see him back in 4 weeks' time for reexam.        This note was dictated using voice recognition software. Please excuse any grammatical or typographical errors.

## 2018-03-14 NOTE — TELEPHONE ENCOUNTER
Left message informing that med legal has been contacted and will let him know if/when a rehab conference is to be scheduled.

## 2018-03-15 ENCOUNTER — TELEPHONE (OUTPATIENT)
Dept: ORTHOPEDICS | Facility: CLINIC | Age: 63
End: 2018-03-15

## 2018-03-15 DIAGNOSIS — Z98.890 S/P RIGHT KNEE ARTHROSCOPY: Primary | ICD-10-CM

## 2018-03-15 DIAGNOSIS — G89.18 POST-OP PAIN: ICD-10-CM

## 2018-03-16 ENCOUNTER — TELEPHONE (OUTPATIENT)
Dept: ORTHOPEDICS | Facility: CLINIC | Age: 63
End: 2018-03-16

## 2018-03-16 NOTE — TELEPHONE ENCOUNTER
----- Message from Glenys Yun sent at 3/16/2018 12:45 PM CDT -----  Contact: self  Patient [phone] is calling about a conference meeting about his returning to work/please call

## 2018-03-16 NOTE — TELEPHONE ENCOUNTER
Pt was attempting to contact med legal. Unsure of how to redirect. Notified rizwan jenkins who has recently spoken with pt.

## 2018-03-21 ENCOUNTER — TELEPHONE (OUTPATIENT)
Dept: ORTHOPEDICS | Facility: CLINIC | Age: 63
End: 2018-03-21

## 2018-03-21 NOTE — TELEPHONE ENCOUNTER
----- Message from Catie Oneill sent at 3/21/2018  1:26 PM CDT -----    Calling   From   [facility]  Calling about  A   Note  From  [facility]   For a  CDL physcial //[phone] //  kady

## 2018-04-02 PROBLEM — G89.18 POST-OP PAIN: Status: RESOLVED | Noted: 2017-12-27 | Resolved: 2018-04-02

## 2018-04-03 DIAGNOSIS — M25.561 ACUTE PAIN OF RIGHT KNEE: Primary | ICD-10-CM

## 2018-04-04 ENCOUNTER — HOSPITAL ENCOUNTER (OUTPATIENT)
Dept: RADIOLOGY | Facility: HOSPITAL | Age: 63
Discharge: HOME | End: 2018-04-04
Attending: ORTHOPAEDIC SURGERY
Payer: COMMERCIAL

## 2018-04-04 ENCOUNTER — OFFICE VISIT (OUTPATIENT)
Dept: ORTHOPEDICS | Facility: CLINIC | Age: 63
End: 2018-04-04
Payer: COMMERCIAL

## 2018-04-04 VITALS — WEIGHT: 173 LBS | BODY MASS INDEX: 23.43 KG/M2 | HEIGHT: 72 IN

## 2018-04-04 DIAGNOSIS — M25.561 ACUTE PAIN OF RIGHT KNEE: ICD-10-CM

## 2018-04-04 DIAGNOSIS — Z98.890 S/P RIGHT KNEE ARTHROSCOPY: Primary | ICD-10-CM

## 2018-04-04 PROCEDURE — 99213 OFFICE O/P EST LOW 20 MIN: CPT | Mod: S$GLB,,, | Performed by: ORTHOPAEDIC SURGERY

## 2018-04-04 PROCEDURE — 99999 PR PBB SHADOW E&M-EST. PATIENT-LVL II: CPT | Mod: PBBFAC,,, | Performed by: ORTHOPAEDIC SURGERY

## 2018-04-04 PROCEDURE — 73562 X-RAY EXAM OF KNEE 3: CPT | Mod: TC,PO,LT

## 2018-04-04 PROCEDURE — 73564 X-RAY EXAM KNEE 4 OR MORE: CPT | Mod: 26,RT,, | Performed by: RADIOLOGY

## 2018-04-04 PROCEDURE — 73562 X-RAY EXAM OF KNEE 3: CPT | Mod: 26,59,LT, | Performed by: RADIOLOGY

## 2018-04-04 NOTE — LETTER
April 4, 2018      [facility]  [address] [facility] [address]  Phone: [phone]       Patient: Misha Simpson   YOB: 1955  Date of Visit: 04/04/2018    To Whom It May Concern:    Janet Simpson  was at [facility] on 04/04/2018. He may return to work on 04-06-18 with no restrictions. If you have any questions or concerns, or if I can be of further assistance, please do not hesitate to contact me.    Sincerely,    Tarun May MD

## 2018-04-04 NOTE — PROGRESS NOTES
DATE: 4/4/2018  PATIENT: Misha Simpson    Attending Physician: Tarun May M.D.    HISTORY:  Misha Simpson is a 62 y.o. male who returns for follow up evaluation of  his right knee.  Status post arthroscopy partial medial and lateral meniscectomies and chondroplasty, 12/14/70 by Dr. Starks.  He did go back to work but notes a moderate amount of discomfort.  Pain is reported at 7/10 today.  He denies any swelling.  He presents for an unscheduled appointment.    PMH/PSH/FamHx/SocHx:  Reviewed and unchanged from prior visit    ROS:  Constitution: Negative for chills, fever, and sweats. Negative for unexplained weight loss.  HENT: Negative for headaches and blurry vision.   Cardiovascular: Negative for chest pain, irregular heartbeat, leg swelling and palpitations.   Respiratory: Negative for cough and shortness of breath.   Gastrointestinal: Negative for abdominal pain, heartburn, nausea and vomiting.   Genitourinary: Negative for bladder incontinence and dysuria.   Musculoskeletal: Negative for systemic arthritis, joint swelling, muscle weakness and myalgias.   Neurological: Negative for numbness.   Psychiatric/Behavioral: Negative for depression.   Endocrine: Negative for polyuria.   Hematologic/Lymphatic: Negative for bleeding disorders.  Skin: Negative for poor wound healing.       EXAM:  Ht 6' (1.829 m)   Wt 78.5 kg (173 lb)   BMI 23.46 kg/m²   Misha Simpson is a well developed, well nourished male in no acute distress. Physical examination of the right knee evaluated the following:     Gait and Alignment  Inspection for ecchymosis, swelling, atrophy, or deformity  Inspection for intra-articular and/or bursal effusions  Tenderness to palpation over the bony and soft tissue structures around the knee  Range of Motion and presence of extensor lag/contractures  Sensation and motor strength  Varus/valgus or anterior/posterior/rotatory instability  Flexion pinch and Herbert's Tests  Patellar  alignment/tracking/pain to palpation  Vascular exam to include skin temperature/color/capillary refill     Remarkable findings included:  Well-healed arthroscopic portals.  No effusion of the knee.  Mild medial lateral tenderness.  Range of motion 0-130° of flexion.  Negative flexion pinch.  Negative Herbert's       IMAGING:   No new x-rays are performed today.    ASSESSMENT:  Status post arthroscopy right knee with partial medial lateral meniscectomies and chondroplasty, 12/14/17 (Dr. Satrks)    PLAN:  The implications of the patient's evolution of symptoms and findings were explained to the patient and his wife in detail.  I have explained that he did have arthritic changes at arthroscopy.  He is most likely contributing to his pain, especially given the fact that he had partial medial lateral meniscectomies.  Options discussed included observation, work status modification, and viscosupplementation. All questions answered and the patient wishes to think about his options.  He'll continue to work full duty at this time.  He'll contact the office should he wish to proceed with Euflexxa injections.  Should he not be able to perform his regular job consideration for a functional capacity evaluation to determine work restrictions will be ordered.  Follow-up if not improving or worse..          This note was dictated using voice recognition software. Please excuse any grammatical or typographical errors.

## 2018-04-11 ENCOUNTER — OFFICE VISIT (OUTPATIENT)
Dept: ORTHOPEDICS | Facility: CLINIC | Age: 63
End: 2018-04-11
Payer: COMMERCIAL

## 2018-04-11 VITALS — HEIGHT: 72 IN | BODY MASS INDEX: 23.43 KG/M2 | WEIGHT: 173 LBS

## 2018-04-11 DIAGNOSIS — Z98.890 S/P RIGHT KNEE ARTHROSCOPY: Primary | ICD-10-CM

## 2018-04-11 DIAGNOSIS — M17.11 PRIMARY OSTEOARTHRITIS OF RIGHT KNEE: ICD-10-CM

## 2018-04-11 PROCEDURE — 99999 PR PBB SHADOW E&M-EST. PATIENT-LVL II: CPT | Mod: PBBFAC,,, | Performed by: ORTHOPAEDIC SURGERY

## 2018-04-11 PROCEDURE — 99213 OFFICE O/P EST LOW 20 MIN: CPT | Mod: S$GLB,,, | Performed by: ORTHOPAEDIC SURGERY

## 2018-04-11 NOTE — PROGRESS NOTES
DATE: 4/11/2018  PATIENT: Misha Simpson    Attending Physician: Tarun May M.D.    HISTORY:  Misha Simpson is a 62 y.o. male who returns for follow up evaluation of  his right knee.  Excess postarthroscopy of partial medial lateral meniscectomies and chondroplasty, 12/1417 by Dr. Starks.  He has been going to work and still notes some difficulty.  He states he has able perform his job with discomfort.  He presents today wishing to discuss Euflexxa injections.  Pain is less during the day but increases to 7/10 at night.    PMH/PSH/FamHx/SocHx:  Reviewed and unchanged from prior visit    ROS:  Constitution: Negative for chills, fever, and sweats. Negative for unexplained weight loss.  HENT: Negative for headaches and blurry vision.   Cardiovascular: Negative for chest pain, irregular heartbeat, leg swelling and palpitations.   Respiratory: Negative for cough and shortness of breath.   Gastrointestinal: Negative for abdominal pain, heartburn, nausea and vomiting.   Genitourinary: Negative for bladder incontinence and dysuria.   Musculoskeletal: Negative for systemic arthritis, joint swelling, muscle weakness and myalgias.   Neurological: Negative for numbness.   Psychiatric/Behavioral: Negative for depression.   Endocrine: Negative for polyuria.   Hematologic/Lymphatic: Negative for bleeding disorders.  Skin: Negative for poor wound healing.       EXAM:  Ht 6' (1.829 m)   Wt 78.5 kg (173 lb)   BMI 23.46 kg/m²   Misha Simpson is a well developed, well nourished male in no acute distress. Physical examination of the right knee evaluated the following:    Gait and Alignment  Inspection for ecchymosis, swelling, atrophy, or deformity  Inspection for intra-articular and/or bursal effusions  Tenderness to palpation over the bony and soft tissue structures around the knee  Range of Motion and presence of extensor lag/contractures  Sensation and motor strength  Varus/valgus or anterior/posterior/rotatory  instability  Flexion pinch and Herbert's Tests  Patellar alignment/tracking/pain to palpation  Vascular exam to include skin temperature/color/capillary refill    Remarkable findings included:  Well-healed arthroscopic portals.  No effusion of the knee.  Mild medial lateral tenderness.  Range of motion 0-130° of flexion.  Negative flexion pinch.  Negative Herbert's      IMAGING:   No new x-rays are performed today.    ASSESSMENT:  Status post arthroscopy right knee with partial medial lateral meniscectomies and chondroplasty, 12/14/17 (Dr. Starks)    PLAN:  The implications of the patient's evolution of symptoms and findings were explained to the patient in detail.  As discussed at the last visit, I do think Visco supplementation (Euflexxa injections) will provide some improvement.  He will continue to work his regular job.  We will request authorization from the insurance company for the injections.  Follow-up with Ladan Gore NP performed injections.  I will see him back injection to check his response    This note was dictated using voice recognition software. Please excuse any grammatical or typographical errors.

## 2018-04-13 ENCOUNTER — TELEPHONE (OUTPATIENT)
Dept: ORTHOPEDICS | Facility: CLINIC | Age: 63
End: 2018-04-13

## 2018-04-13 NOTE — TELEPHONE ENCOUNTER
----- Message from Usha Mathews sent at 4/12/2018 11:10 AM CDT -----  Contact: usha hines [facility] x [phone]  I need cpt code for procedure and euflexxa inj please before they can process claim for pt.  Thanks

## 2018-04-17 ENCOUNTER — TELEPHONE (OUTPATIENT)
Dept: ORTHOPEDICS | Facility: CLINIC | Age: 63
End: 2018-04-17

## 2018-04-17 NOTE — TELEPHONE ENCOUNTER
Pt scheduled for injection appts. States that his knee was sore after previous injections, so he may take a day off after injection. Will discuss at next weeks appt.

## 2018-04-17 NOTE — TELEPHONE ENCOUNTER
----- Message from Usha Mathews sent at 4/17/2018  8:17 AM CDT -----  Contact: usha/dexter hines x [phone]  Workers comp pt.  Xander maya.  Pt is authorized to get euflexxa inj.  Can he go to work after his inj? He is trying to find out what is best for him to do. Please advise  Thanks

## 2018-04-25 ENCOUNTER — OFFICE VISIT (OUTPATIENT)
Dept: ORTHOPEDICS | Facility: CLINIC | Age: 63
End: 2018-04-25
Payer: COMMERCIAL

## 2018-04-25 VITALS — BODY MASS INDEX: 23.43 KG/M2 | HEIGHT: 72 IN | WEIGHT: 173 LBS

## 2018-04-25 DIAGNOSIS — M17.11 PRIMARY OSTEOARTHRITIS OF RIGHT KNEE: Primary | ICD-10-CM

## 2018-04-25 PROCEDURE — 99999 PR PBB SHADOW E&M-EST. PATIENT-LVL II: CPT | Mod: PBBFAC,,, | Performed by: NURSE PRACTITIONER

## 2018-04-25 PROCEDURE — 99499 UNLISTED E&M SERVICE: CPT | Mod: S$GLB,,, | Performed by: NURSE PRACTITIONER

## 2018-04-25 PROCEDURE — 20610 DRAIN/INJ JOINT/BURSA W/O US: CPT | Mod: RT,S$GLB,, | Performed by: NURSE PRACTITIONER

## 2018-04-25 NOTE — PROGRESS NOTES
"DATE: 4/25/2018  PATIENT: Misha Simpson    Attending Physician: Tarun May M.D.    HISTORY:  Misha Simpson is a 62 y.o. male who returns for follow up evaluation of  his right knee.  Excess postarthroscopy of partial medial lateral meniscectomies and chondroplasty, 12/1417 by Dr. Starks.  He has been going to work and is "bearing it."  He has more pain with increased walking. His pain report today is 7/10.  He presents today for Euflexxa injection #1.      PMH/PSH/FamHx/SocHx:  Reviewed and unchanged from prior visit    ROS:  Constitution: Negative for chills, fever, and sweats. Negative for unexplained weight loss.  HENT: Negative for headaches and blurry vision.   Cardiovascular: Negative for chest pain, irregular heartbeat, leg swelling and palpitations.   Respiratory: Negative for cough and shortness of breath.   Gastrointestinal: Negative for abdominal pain, heartburn, nausea and vomiting.   Genitourinary: Negative for bladder incontinence and dysuria.   Musculoskeletal: Negative for systemic arthritis, joint swelling, muscle weakness and myalgias.   Neurological: Negative for numbness.   Psychiatric/Behavioral: Negative for depression.   Endocrine: Negative for polyuria.   Hematologic/Lymphatic: Negative for bleeding disorders.  Skin: Negative for poor wound healing.       EXAM:  There were no vitals taken for this visit.  Misha Simpson is a well developed, well nourished male in no acute distress. Physical examination of the right knee evaluated the following:     Gait and Alignment  Inspection for ecchymosis, swelling, atrophy, or deformity  Inspection for intra-articular and/or bursal effusions  Tenderness to palpation over the bony and soft tissue structures around the knee  Range of Motion and presence of extensor lag/contractures  Sensation and motor strength  Varus/valgus or anterior/posterior/rotatory instability  Flexion pinch and Herbert's Tests  Patellar alignment/tracking/pain to " palpation  Vascular exam to include skin temperature/color/capillary refill     Remarkable findings included:  Well-healed arthroscopic portals.  No effusion of the knee.  Mild medial lateral tenderness.  Range of motion 0-130° of flexion.  Negative flexion pinch.  Negative Herbert's        IMAGING:   No new x-rays are performed today.     ASSESSMENT:  Status post arthroscopy right knee with partial medial lateral meniscectomies and chondroplasty, 12/14/17 (Dr. Starks)     PLAN:  The implications of the patient's evolution of symptoms and findings were explained to the patient in detail.  Euflexa injection # 1 performed to the right knee. Patient instructed on signs and symptoms to monitor for inflammation/infection. Instructions to elevate leg and apply ice tonight.  Follow up 1 week for injection #2. Dr May will see him back post-injections to check his response.

## 2018-04-25 NOTE — LETTER
April 25, 2018      Tarun May MD  [address] [facility] [address] [place] [address]           [facility]  [address] [facility] [address] [facility] [address]  Phone: [phone]          Patient: Misha Simpson   MR Number: [id number]   YOB: 1955   Date of Visit: 4/25/2018       Dear Dr. Tarun May:    Thank you for referring Misha Simpson to me for evaluation. Attached you will find relevant portions of my assessment and plan of care.    If you have questions, please do not hesitate to call me. I look forward to following Misha Simpson along with you.    Sincerely,    Ladan Gore, MARIANGEL    Enclosure  CC:  No Recipients    If you would like to receive this communication electronically, please contact [email or web address] or [phone] to request more information on [employer] Link access.    For providers and/or their staff who would like to refer a patient to [facility], please contact us through our one-stop-shop provider referral line, [facility], at [phone].    If you feel you have received this communication in error or would no longer like to receive these types of communications, please e-mail [email or web address]

## 2018-05-04 ENCOUNTER — OFFICE VISIT (OUTPATIENT)
Dept: ORTHOPEDICS | Facility: CLINIC | Age: 63
End: 2018-05-04
Payer: COMMERCIAL

## 2018-05-04 VITALS — HEIGHT: 72 IN | WEIGHT: 173 LBS | BODY MASS INDEX: 23.43 KG/M2

## 2018-05-04 DIAGNOSIS — M17.11 PRIMARY OSTEOARTHRITIS OF RIGHT KNEE: Primary | ICD-10-CM

## 2018-05-04 PROCEDURE — 99499 UNLISTED E&M SERVICE: CPT | Mod: S$GLB,,, | Performed by: NURSE PRACTITIONER

## 2018-05-04 PROCEDURE — 99999 PR PBB SHADOW E&M-EST. PATIENT-LVL II: CPT | Mod: PBBFAC,,, | Performed by: NURSE PRACTITIONER

## 2018-05-04 PROCEDURE — 20610 DRAIN/INJ JOINT/BURSA W/O US: CPT | Mod: RT,S$GLB,, | Performed by: NURSE PRACTITIONER

## 2018-05-04 NOTE — PROGRESS NOTES
DATE: 5/4/2018  PATIENT: Misha Simpson    Attending Physician: Tarun May M.D.    HISTORY:  Misha Simpson is a 62 y.o. male who returns for follow up evaluation of  right knee.  He is diagnosed with osteoarthritis. He underwent Euflexxa injection # 2 last week without complication. He presents today for Euflexxa injection # 2.  He does note improvement in pain since receiving an injection last week.  His pain rating today is 8/10.        PMH/PSH/FamHx/SocHx:  Reviewed and unchanged from prior visit    ROS:  Constitution: Negative for chills, fever, and sweats. Negative for unexplained weight loss.  HENT: Negative for headaches and blurry vision.   Cardiovascular: Negative for chest pain, irregular heartbeat, leg swelling and palpitations.   Respiratory: Negative for cough and shortness of breath.   Gastrointestinal: Negative for abdominal pain, heartburn, nausea and vomiting.   Genitourinary: Negative for bladder incontinence and dysuria.   Musculoskeletal: Negative for systemic arthritis, joint swelling, muscle weakness and myalgias.   Neurological: Negative for numbness.   Psychiatric/Behavioral: Negative for depression.   Endocrine: Negative for polyuria.   Hematologic/Lymphatic: Negative for bleeding disorders.  Skin: Negative for poor wound healing.       EXAM:  Ht 6' (1.829 m)   Wt 78.5 kg (173 lb)   BMI 23.46 kg/m²   Misha Simpson is a well developed, well nourished male in no acute distress. Physical examination of the right knee evaluated the following:     Gait and Alignment  Inspection for ecchymosis, swelling, atrophy, or deformity  Inspection for intra-articular and/or bursal effusions  Tenderness to palpation over the bony and soft tissue structures around the knee  Range of Motion and presence of extensor lag/contractures  Sensation and motor strength  Varus/valgus or anterior/posterior/rotatory instability  Flexion pinch and Herbert's Tests  Patellar alignment/tracking/pain to  palpation  Vascular exam to include skin temperature/color/capillary refill     Remarkable findings included:  Well-healed arthroscopic portals.  No effusion of the knee.  Mild medial lateral tenderness.  Range of motion 0-130° of flexion.  Negative flexion pinch.  Negative Herbert's        IMAGING:   No new x-rays are performed today.     ASSESSMENT:  Status post arthroscopy right knee with partial medial lateral meniscectomies and chondroplasty, 12/14/17 (Dr. Starks)       PLAN:  The implications of the patient's evolution of symptoms and findings were explained to the patient in detail.  Euflexxa injection #2 performed Right knee today (see procedure documentation).  He will monitor for signs and symptoms of infection/inflammation.  Instructed to elevate legs and apply ice tonight.  He will return next week for Euflexxa injection #3 or for follow up evaluation as needed.

## 2018-05-04 NOTE — LETTER
May 4, 2018      Tarun May MD  [address] [facility] [address] [place] [address]           [facility]  [address] [address] [facility] [address]  Phone: [phone]          Patient: Misha Simpson   MR Number: [id number]   YOB: 1955   Date of Visit: 5/4/2018       Dear Dr. Tarun May:    Thank you for referring Misha Simpson to me for evaluation. Attached you will find relevant portions of my assessment and plan of care.    If you have questions, please do not hesitate to call me. I look forward to following Misha Simpson along with you.    Sincerely,    Ladan Gore, MARIANGEL    Enclosure  CC:  No Recipients    If you would like to receive this communication electronically, please contact [email or web address] or [phone] to request more information on [employer] Link access.    For providers and/or their staff who would like to refer a patient to [facility], please contact us through our one-stop-shop provider referral line, [facility], at [phone].    If you feel you have received this communication in error or would no longer like to receive these types of communications, please e-mail [email or web address]

## 2018-05-04 NOTE — PROCEDURES
Large Joint Aspiration/Injection  Date/Time: 5/4/2018 11:31 AM  Performed by: MADELYN CRUZ  Authorized by: MADELYN CRUZ     Consent Done?:  Yes (Verbal)  Indications:  Pain  Timeout: Prior to procedure the correct patient, procedure, and site was verified      Location:  Knee  Site:  R knee  Prep: Patient was prepped and draped in usual sterile fashion    Ultrasonic Guidance for needle placement: No  Needle size:  22 G  Approach:  Anterolateral  Medications:  20 mg sodium hyaluronate (viscosup) 10 mg/mL(mw 2.4 -3.6 million)  Patient tolerance:  Patient tolerated the procedure well with no immediate complications

## 2018-05-09 ENCOUNTER — OFFICE VISIT (OUTPATIENT)
Dept: ORTHOPEDICS | Facility: CLINIC | Age: 63
End: 2018-05-09
Payer: COMMERCIAL

## 2018-05-09 VITALS — BODY MASS INDEX: 23.43 KG/M2 | WEIGHT: 173 LBS | HEIGHT: 72 IN

## 2018-05-09 DIAGNOSIS — M17.11 PRIMARY OSTEOARTHRITIS OF RIGHT KNEE: Primary | ICD-10-CM

## 2018-05-09 PROCEDURE — 99499 UNLISTED E&M SERVICE: CPT | Mod: S$GLB,,, | Performed by: NURSE PRACTITIONER

## 2018-05-09 PROCEDURE — 99999 PR PBB SHADOW E&M-EST. PATIENT-LVL II: CPT | Mod: PBBFAC,,, | Performed by: NURSE PRACTITIONER

## 2018-05-09 PROCEDURE — 20610 DRAIN/INJ JOINT/BURSA W/O US: CPT | Mod: RT,S$GLB,, | Performed by: NURSE PRACTITIONER

## 2018-05-09 NOTE — LETTER
May 9, 2018      Tarun May MD  [address] [facility] [address] [place] [address]           [facility]  [address] [facility] [address]  Phone: [phone]          Patient: Misha Simpson   MR Number: [id number]   YOB: 1955   Date of Visit: 5/9/2018       Dear Dr. Tarun May:    Thank you for referring Misha Simpson to me for evaluation. Attached you will find relevant portions of my assessment and plan of care.    If you have questions, please do not hesitate to call me. I look forward to following Misha Simpson along with you.    Sincerely,    Ladan Gore, MARIANGEL    Enclosure  CC:  No Recipients    If you would like to receive this communication electronically, please contact [email or web address] or [phone] to request more information on [employer] Link access.    For providers and/or their staff who would like to refer a patient to [facility], please contact us through our one-stop-shop provider referral line, [facility] , at [phone].    If you feel you have received this communication in error or would no longer like to receive these types of communications, please e-mail [email or web address]

## 2018-05-09 NOTE — PROGRESS NOTES
DATE: 5/9/2018  PATIENT: Misha Simpson    Attending Physician: Tarun May M.D.    HISTORY:  Misha Simpson is a 62 y.o. male who returns for follow up evaluation of  right knee.  He is diagnosed with osteoarthritis. He underwent Euflexxa injection # 2 last week without complication. He presents today for Euflexxa injection # 3.  He does note improvement in pain since receiving an injection last week.  His pain rating today is 7/10.    PMH/PSH/FamHx/SocHx:  Reviewed and unchanged from prior visit    ROS:  Constitution: Negative for chills, fever, and sweats. Negative for unexplained weight loss.  HENT: Negative for headaches and blurry vision.   Cardiovascular: Negative for chest pain, irregular heartbeat, leg swelling and palpitations.   Respiratory: Negative for cough and shortness of breath.   Gastrointestinal: Negative for abdominal pain, heartburn, nausea and vomiting.   Genitourinary: Negative for bladder incontinence and dysuria.   Musculoskeletal: Negative for systemic arthritis, joint swelling, muscle weakness and myalgias.   Neurological: Negative for numbness.   Psychiatric/Behavioral: Negative for depression.   Endocrine: Negative for polyuria.   Hematologic/Lymphatic: Negative for bleeding disorders.  Skin: Negative for poor wound healing.       EXAM:  Ht 6' (1.829 m)   Wt 78.5 kg (173 lb)   BMI 23.46 kg/m²   Misha Simpson is a well developed, well nourished male in no acute distress. Physical examination of the right knee evaluated the following:     Gait and Alignment  Inspection for ecchymosis, swelling, atrophy, or deformity  Inspection for intra-articular and/or bursal effusions  Tenderness to palpation over the bony and soft tissue structures around the knee  Range of Motion and presence of extensor lag/contractures  Sensation and motor strength  Varus/valgus or anterior/posterior/rotatory instability  Flexion pinch and Herbert's Tests  Patellar alignment/tracking/pain to  palpation  Vascular exam to include skin temperature/color/capillary refill     Remarkable findings included:  Well-healed arthroscopic portals.  No effusion of the knee.  Mild medial lateral tenderness.  Range of motion 0-130° of flexion.  Negative flexion pinch.  Negative Herbert's        IMAGING:   No new x-rays are performed today.     ASSESSMENT:  Status post arthroscopy right knee with partial medial lateral meniscectomies and chondroplasty, 12/14/17 (Dr. Starks)        PLAN:  The implications of the patient's evolution of symptoms and findings were explained to the patient in detail.  Euflexxa injection #3 performed Right knee today (see procedure documentation).  He will monitor for signs and symptoms of infection/inflammation.  Instructed to elevate legs and apply ice tonight.  He will return in 4 weeks for  follow up evaluation of his response to the injections.

## 2018-05-09 NOTE — PROCEDURES
Large Joint Aspiration/Injection  Date/Time: 5/9/2018 10:28 AM  Performed by: MADELYN CRUZ  Authorized by: MADELYN CRUZ     Consent Done?:  Yes (Verbal)  Indications:  Pain and joint swelling  Timeout: Prior to procedure the correct patient, procedure, and site was verified      Location:  Knee  Site:  R knee  Prep: Patient was prepped and draped in usual sterile fashion    Ultrasonic Guidance for needle placement: No  Needle size:  22 G  Approach:  Anterolateral  Patient tolerance:  Patient tolerated the procedure well with no immediate complications

## 2018-06-05 ENCOUNTER — CLINICAL SUPPORT (OUTPATIENT)
Dept: SMOKING CESSATION | Facility: CLINIC | Age: 63
End: 2018-06-05
Payer: COMMERCIAL

## 2018-06-05 DIAGNOSIS — F17.200 NICOTINE DEPENDENCE: Primary | ICD-10-CM

## 2018-06-05 PROCEDURE — 99407 BEHAV CHNG SMOKING > 10 MIN: CPT | Mod: S$GLB,,,

## 2018-06-05 NOTE — PROGRESS NOTES
Called pt to f/u on his 3 and 6 month smoking cessation quit status. Pt stated he is still smoking. Informed him he has benefits available and is able to rejoin. Pt not ready to make appointment. He will call back when ready.

## 2018-06-06 ENCOUNTER — OFFICE VISIT (OUTPATIENT)
Dept: ORTHOPEDICS | Facility: CLINIC | Age: 63
End: 2018-06-06
Payer: COMMERCIAL

## 2018-06-06 VITALS — WEIGHT: 173 LBS | HEIGHT: 72 IN | BODY MASS INDEX: 23.43 KG/M2

## 2018-06-06 DIAGNOSIS — Z98.890 S/P RIGHT KNEE ARTHROSCOPY: Primary | ICD-10-CM

## 2018-06-06 DIAGNOSIS — M17.11 PRIMARY OSTEOARTHRITIS OF RIGHT KNEE: ICD-10-CM

## 2018-06-06 PROCEDURE — 99213 OFFICE O/P EST LOW 20 MIN: CPT | Mod: S$GLB,,, | Performed by: ORTHOPAEDIC SURGERY

## 2018-06-06 PROCEDURE — 99999 PR PBB SHADOW E&M-EST. PATIENT-LVL II: CPT | Mod: PBBFAC,,, | Performed by: ORTHOPAEDIC SURGERY

## 2018-06-06 RX ORDER — MELOXICAM 15 MG/1
15 TABLET ORAL DAILY
Qty: 30 TABLET | Refills: 0 | Status: SHIPPED | OUTPATIENT
Start: 2018-06-06 | End: 2018-08-06

## 2018-06-06 NOTE — PROGRESS NOTES
DATE: 6/6/2018  PATIENT: Misha Simpson    Attending Physician: Tarun May M.D.    HISTORY:  Misha Simpson is a 62 y.o. male who returns for follow up evaluation of  his right knee.  He is status post arthroscopy partial medial lateral meniscectomies and chondroplasty, 12/14/17 by Dr. Starks.  He did undergo a series of viscous supplementation injections which she reports has helped.  He has been able to work his regular job.  He still notes some pain on daily basis which she is tolerating.    PMH/PSH/FamHx/SocHx:  Reviewed and unchanged from prior visit    ROS:  Constitution: Negative for chills, fever, and sweats. Negative for unexplained weight loss.  HENT: Negative for headaches and blurry vision.   Cardiovascular: Negative for chest pain, irregular heartbeat, leg swelling and palpitations.   Respiratory: Negative for cough and shortness of breath.   Gastrointestinal: Negative for abdominal pain, heartburn, nausea and vomiting.   Genitourinary: Negative for bladder incontinence and dysuria.   Musculoskeletal: Negative for systemic arthritis, joint swelling, muscle weakness and myalgias.   Neurological: Negative for numbness.   Psychiatric/Behavioral: Negative for depression.   Endocrine: Negative for polyuria.   Hematologic/Lymphatic: Negative for bleeding disorders.  Skin: Negative for poor wound healing.       EXAM:  Ht 6' (1.829 m)   Wt 78.5 kg (173 lb)   BMI 23.46 kg/m²   Misha Simpson is a well developed, well nourished male in no acute distress. Physical examination of the right knee evaluated the following:     Gait and Alignment  Inspection for ecchymosis, swelling, atrophy, or deformity  Inspection for intra-articular and/or bursal effusions  Tenderness to palpation over the bony and soft tissue structures around the knee  Range of Motion and presence of extensor lag/contractures  Sensation and motor strength  Varus/valgus or anterior/posterior/rotatory instability  Flexion pinch and  Herbert's Tests  Patellar alignment/tracking/pain to palpation  Vascular exam to include skin temperature/color/capillary refill     Remarkable findings included:  Well-healed arthroscopic portals.  No effusion of the knee.  Mild medial lateral tenderness.  Range of motion 0-130° of flexion.  Negative flexion pinch.  Negative Herbert's       IMAGING:   No new x-rays are performed today.    ASSESSMENT:  Status post arthroscopy right knee with partial medial lateral meniscectomies and chondroplasty, 12/14/17 (Dr. Starks)    PLAN:  The implications of the patient's evolution of symptoms and findings were explained to the patient in detail.  Overall Misha is doing fairly well.  His symptoms although not perfect are tolerable.  I have sent a prescription of meloxicam to his pharmacy.  He will continue work his regular job.  Activities may be performed as tolerated.  Should he have further problems, I will see him back.  Otherwise, follow-up as needed.          This note was dictated using voice recognition software. Please excuse any grammatical or typographical errors.

## 2018-08-06 ENCOUNTER — OFFICE VISIT (OUTPATIENT)
Dept: FAMILY MEDICINE | Facility: CLINIC | Age: 63
End: 2018-08-06
Payer: COMMERCIAL

## 2018-08-06 ENCOUNTER — HOSPITAL ENCOUNTER (OUTPATIENT)
Dept: RADIOLOGY | Facility: HOSPITAL | Age: 63
Discharge: HOME | End: 2018-08-06
Attending: FAMILY MEDICINE
Payer: COMMERCIAL

## 2018-08-06 ENCOUNTER — OFFICE VISIT (OUTPATIENT)
Dept: PODIATRY | Facility: CLINIC | Age: 63
End: 2018-08-06
Payer: COMMERCIAL

## 2018-08-06 VITALS — WEIGHT: 173.94 LBS | HEIGHT: 72 IN | BODY MASS INDEX: 23.56 KG/M2

## 2018-08-06 VITALS
TEMPERATURE: 98 F | HEART RATE: 52 BPM | BODY MASS INDEX: 23.57 KG/M2 | SYSTOLIC BLOOD PRESSURE: 102 MMHG | HEIGHT: 72 IN | WEIGHT: 174 LBS | DIASTOLIC BLOOD PRESSURE: 67 MMHG

## 2018-08-06 DIAGNOSIS — S92.424A NONDISPLACED FRACTURE OF DISTAL PHALANX OF RIGHT GREAT TOE, INITIAL ENCOUNTER FOR CLOSED FRACTURE: Primary | ICD-10-CM

## 2018-08-06 DIAGNOSIS — E11.59 HYPERTENSION ASSOCIATED WITH DIABETES: ICD-10-CM

## 2018-08-06 DIAGNOSIS — S92.424D CLOSED NONDISPLACED FRACTURE OF DISTAL PHALANX OF RIGHT GREAT TOE WITH ROUTINE HEALING, SUBSEQUENT ENCOUNTER: ICD-10-CM

## 2018-08-06 DIAGNOSIS — Z86.010 HISTORY OF ADENOMATOUS POLYP OF COLON: ICD-10-CM

## 2018-08-06 DIAGNOSIS — I15.2 HYPERTENSION ASSOCIATED WITH DIABETES: ICD-10-CM

## 2018-08-06 DIAGNOSIS — S92.424D CLOSED NONDISPLACED FRACTURE OF DISTAL PHALANX OF RIGHT GREAT TOE WITH ROUTINE HEALING, SUBSEQUENT ENCOUNTER: Primary | ICD-10-CM

## 2018-08-06 PROCEDURE — 3008F BODY MASS INDEX DOCD: CPT | Mod: CPTII,S$GLB,, | Performed by: FAMILY MEDICINE

## 2018-08-06 PROCEDURE — 3074F SYST BP LT 130 MM HG: CPT | Mod: CPTII,S$GLB,, | Performed by: PODIATRIST

## 2018-08-06 PROCEDURE — 3044F HG A1C LEVEL LT 7.0%: CPT | Mod: CPTII,S$GLB,, | Performed by: FAMILY MEDICINE

## 2018-08-06 PROCEDURE — 73660 X-RAY EXAM OF TOE(S): CPT | Mod: TC,PO,RT

## 2018-08-06 PROCEDURE — 99999 PR PBB SHADOW E&M-EST. PATIENT-LVL IV: CPT | Mod: PBBFAC,,, | Performed by: PODIATRIST

## 2018-08-06 PROCEDURE — 99214 OFFICE O/P EST MOD 30 MIN: CPT | Mod: 25,S$GLB,, | Performed by: PODIATRIST

## 2018-08-06 PROCEDURE — 3074F SYST BP LT 130 MM HG: CPT | Mod: CPTII,S$GLB,, | Performed by: FAMILY MEDICINE

## 2018-08-06 PROCEDURE — 3008F BODY MASS INDEX DOCD: CPT | Mod: CPTII,S$GLB,, | Performed by: PODIATRIST

## 2018-08-06 PROCEDURE — 3078F DIAST BP <80 MM HG: CPT | Mod: CPTII,S$GLB,, | Performed by: FAMILY MEDICINE

## 2018-08-06 PROCEDURE — 99214 OFFICE O/P EST MOD 30 MIN: CPT | Mod: S$GLB,,, | Performed by: FAMILY MEDICINE

## 2018-08-06 PROCEDURE — 3078F DIAST BP <80 MM HG: CPT | Mod: CPTII,S$GLB,, | Performed by: PODIATRIST

## 2018-08-06 PROCEDURE — 99999 PR PBB SHADOW E&M-EST. PATIENT-LVL III: CPT | Mod: PBBFAC,,, | Performed by: FAMILY MEDICINE

## 2018-08-06 PROCEDURE — 73660 X-RAY EXAM OF TOE(S): CPT | Mod: 26,RT,, | Performed by: RADIOLOGY

## 2018-08-06 PROCEDURE — 28490 TREAT BIG TOE FRACTURE: CPT | Mod: RT,S$GLB,, | Performed by: PODIATRIST

## 2018-08-06 RX ORDER — PRAVASTATIN SODIUM 20 MG/1
20 TABLET ORAL DAILY
Qty: 90 TABLET | Refills: 3 | Status: SHIPPED | OUTPATIENT
Start: 2018-08-06 | End: 2021-11-10 | Stop reason: SDUPTHER

## 2018-08-06 RX ORDER — MELOXICAM 7.5 MG/1
7.5 TABLET ORAL DAILY
Qty: 30 TABLET | Refills: 1 | Status: SHIPPED | OUTPATIENT
Start: 2018-08-06 | End: 2019-03-11

## 2018-08-06 NOTE — PROGRESS NOTES
Patient had a hammer fall on his toe couple of weeks ago and sustained a fracture of the great toe distal phalanx as per below x-ray.  He has been using a hard-soled postop shoe.  Slowly improving.  Has not followed up with Orthopedics or Podiatry.  ER visit as per below.    XR Toe 1st Digit Right7/20/2018  [facility]  Result Narrative   REASON FOR EXAM: hammer fell onto toe     TECHNICAL FACTORS: Three views    COMPARISON: None    FINDINGS: Comminuted fracture great toe distal phalanx There is no evidence of subluxation. Minimal degenerative change first MTP joint. Soft tissue swelling of the great toe.    IMPRESSION:  Comminuted fracture base of the first toe distal phalanx. Fracture considered open due to nailbed proximity. Possible intra-articular extension to the lateral IP joint margin.    Electronically signed by Luis Alfredo Poole MD on 7/21/2018 12:02 AM     ED Provider Notes - Soni Thakur PA - 07/20/2018 10:28 PM CDT  Formatting of this note may be different from the original.    Triage Note Reviewed    History     Chief Complaint   Patient presents with    Toe Pain     HPI     63-year-old male presents for evaluation of pain, bruising, and swelling of the right great toe after a hammer fell onto it this afternoon. No weakness or numbness. Worse with movement, better at rest.     Review of Systems   Musculoskeletal: Positive for arthralgias and joint swelling.   Skin: Positive for color change.   Neurological: Negative for weakness and numbness.   All other systems reviewed and are negative.        Allergies   Allergen Reactions    Aspirin   Stomach problems and heart burn     Past Medical History:   Diagnosis Date    Diabetes mellitus type II    Emphysema of lung (HCC)    History of hemoptysis    Hypertension    Peptic ulcer disease     Past Surgical History:   Procedure Laterality Date    Adrenal mass status post biopsy    Appendectomy    Bone marrow biopsy    Bronchoscopy     Colonoscopy 8/6/2013   Procedure: COLONOSCOPY; Surgeon: Erlin Montero MD; Location: [facility]; Service: Gastroenterology; Laterality: N/A; with cold bx and cold snare    Colonoscopy w/ polypectomy   about 7 years ago    Hm colonoscopy 8/6/2013      Rt. knee surgery Right       Family History   Problem Relation Age of Onset    Heart disease Mother    Kidney disease Father     Social History   Substance Use Topics    Smoking status: Current Every Day Smoker   Packs/day: 0.50   Years: 50.00   Types: Cigarettes    Smokeless tobacco: Never Used   Comment: 3/22/18: smoking 6 per day ,7/6/16 Smokes 1/2 PPD    Alcohol use No     Physical Exam     Visit Vitals  /82 (BP Location: Right arm, Patient Position: Sitting)   Pulse 56   Temp 97.9 °F (36.6 °C) (Oral)   Resp 18   Ht 6' (1.829 m)   Wt 175 lb 3.2 oz (79.5 kg)   SpO2 99%   BMI 23.76 kg/m²     Physical Exam   Constitutional: He is oriented to person, place, and time. He appears well-developed and well-nourished. No distress.   HENT:   Head: Normocephalic and atraumatic.   Eyes: EOM are normal.   Neck: Normal range of motion.   Cardiovascular: Normal rate, regular rhythm and intact distal pulses.   Pulmonary/Chest: Effort normal. No respiratory distress.   Musculoskeletal:   Ecchymosis and swelling noted to the right great toe with superficial skin tear on the dorsal aspect. Tender to palpation. Range of motion intact. Sensation and capillary refill normal.   Neurological: He is alert and oriented to person, place, and time.   Skin: Skin is warm and dry.   Nursing note and vitals reviewed.    ED Course   Labs Reviewed - No data to display    Lab Results for last 36Hrs:  No results found for this or any previous visit (from the past 36 hour(s)).    Diagnostic Results for last 36Hrs:  Xr Toe 1st Digit Right    Result Date: 7/21/2018  REASON FOR EXAM: hammer fell onto toe TECHNICAL FACTORS: Three views COMPARISON: None FINDINGS: Comminuted fracture great  toe distal phalanx There is no evidence of subluxation. Minimal degenerative change first MTP joint. Soft tissue swelling of the great toe. IMPRESSION: Comminuted fracture base of the first toe distal phalanx. Fracture considered open due to nailbed proximity. Possible intra-articular extension to the lateral IP joint margin. Electronically signed by Luis Alfredo Poole MD on 7/21/2018 12:02 AM     Wet Read Results  XR Toe 1st Digit Right   Final Result       Medications - No data to display    Procedures        MDM     Nondisplaced fracture noted to the distal phalanx of the great toe per my interpretation. Radiologist report is pending. Hard sole shoe applied. Wound care in ED by nurse. Wound care instructions discussed. Rest, ice, elevate. Norco prescribed for pain. Follow-up with orthopedics. Return for any concerns.    New Prescriptions   HYDROCODONE-ACETAMINOPHEN (NORCO) 5-325 MG PER TABLET Take 1 tablet by mouth every 8 (eight) hours as needed for Pain.     ED Critical Care Time    Diagnosis:  Final diagnoses:   Closed nondisplaced fracture of distal phalanx of right great toe, initial encounter       Soni Thakur PA  07/21/18 0014          Diabetes Management Status    Statin: Taking  ACE/ARB: Taking    Screening or Prevention Patient's value Goal Complete/Controlled?   HgA1C Testing and Control   Lab Results   Component Value Date    HGBA1C 6.4 (H) 03/06/2018      Annually/Less than 8% Yes   Lipid profile : 03/06/2018 Annually Yes   LDL control Lab Results   Component Value Date    LDLCALC 76.2 03/06/2018    Annually/Less than 100 mg/dl  Yes   Nephropathy screening Lab Results   Component Value Date    LABMICR 8.0 03/06/2018     No results found for: PROTEINUA Annually Yes   Blood pressure BP Readings from Last 1 Encounters:   08/06/18 102/67    Less than 140/90 Yes   Dilated retinal exam : 12/12/2017 Annually Yes   Foot exam   : 08/06/2018 Annually Yes         Past Medical History:  Past Medical History:    Diagnosis Date    Adrenal adenoma 2008    bx benign    Arthritis     Bronchiectasis 5/15/2012    Bronchiectasis 5/15/2012    Cataract     COPD, mild 10/2/2017    Diabetes mellitus, type 2     Emphysema     Hemoptysis     Hyperlipidemia     Hyperplastic colon polyp     Hypertension     Lung nodules     small stable on serial ct    Lymphocytosis     reactive    Peptic ulcer disease     Pneumonia     Tobacco abuse 5/15/2012     Past Surgical History:   Procedure Laterality Date    APPENDECTOMY      BONE MARROW BIOPSY      BRONCHOSCOPY      COLONOSCOPY  8/6/2013         KNEE ARTHROSCOPY Left     MULTIPLE TOOTH EXTRACTIONS       Social History     Social History    Marital status:      Spouse name: N/A    Number of children: N/A    Years of education: N/A     Occupational History    Not on file.     Social History Main Topics    Smoking status: Current Every Day Smoker     Packs/day: 0.25     Years: 53.00     Types: Cigarettes    Smokeless tobacco: Never Used      Comment: 1 pack every 3 days    Alcohol use No      Comment: 1 beer every 2 weeks    Drug use: No    Sexual activity: Not on file     Other Topics Concern    Not on file     Social History Narrative    No narrative on file     Family History   Problem Relation Age of Onset    Kidney failure Father     Diabetes Father     Heart disease Mother 60    Diabetes Brother     Cancer Brother         prostate    Glaucoma Brother         ?    Cancer Brother     Amblyopia Neg Hx     Blindness Neg Hx     Cataracts Neg Hx     Hypertension Neg Hx     Macular degeneration Neg Hx     Retinal detachment Neg Hx     Strabismus Neg Hx     Stroke Neg Hx     Thyroid disease Neg Hx      Review of patient's allergies indicates:   Allergen Reactions    Aspirin      Other reaction(s): Stomach upset  Other reaction(s): Nausea     Current Outpatient Prescriptions on File Prior to Visit   Medication Sig Dispense Refill     amLODIPine (NORVASC) 5 MG tablet TAKE 1 TABLET BY MOUTH ONCE DAILY 90 tablet 0    benazepril (LOTENSIN) 40 MG tablet TAKE 1 TABLET BY MOUTH ONCE DAILY 90 tablet 1    blood sugar diagnostic (BLOOD GLUCOSE TEST) Strp Test glucose 3 x daily 100 strip 11    dulaglutide (TRULICITY) 0.75 mg/0.5 mL PnIj Inject 0.5 mLs (0.75 mg total) into the skin once a week. 4 Syringe 6    hydroCHLOROthiazide (MICROZIDE) 12.5 mg capsule TAKE ONE CAPSULE BY MOUTH EVERY DAY 90 capsule 1    lancets Misc As directed 100 each 11    metformin (GLUCOPHAGE) 1000 MG tablet Take 1 tablet (1,000 mg total) by mouth 2 (two) times daily with meals. 60 tablet 5    [DISCONTINUED] meloxicam (MOBIC) 7.5 MG tablet Take 1 tablet (7.5 mg total) by mouth once daily. 30 tablet 1    [DISCONTINUED] ranitidine (ZANTAC) 150 MG tablet Take 1 tablet (150 mg total) by mouth once daily. 30 tablet 1    [DISCONTINUED] diclofenac sodium (VOLTAREN) 1 % Gel Apply 2 g topically 4 (four) times daily. Cannot take NSAIDs secondary GI upset 300 g 1    [DISCONTINUED] docusate sodium (COLACE) 100 MG capsule Take 1 capsule (100 mg total) by mouth 2 (two) times daily. Can get OTC if more cost effective 60 capsule 0    [DISCONTINUED] meloxicam (MOBIC) 15 MG tablet Take 1 tablet (15 mg total) by mouth once daily. 30 tablet 0    [DISCONTINUED] nicotine (NICODERM CQ) 21 mg/24 hr Place 1 patch onto the skin once daily. 28 patch 0    [DISCONTINUED] oxyCODONE-acetaminophen (PERCOCET) 5-325 mg per tablet Take 1 tablet by mouth every 8 (eight) hours as needed for Pain. 90 tablet 0    [DISCONTINUED] promethazine (PHENERGAN) 25 MG tablet Take 1 tablet (25 mg total) by mouth every 6 (six) hours as needed for Nausea. 20 tablet 0     No current facility-administered medications on file prior to visit.          Vitals:    08/06/18 0918   BP: 102/67   Pulse: (!) 52   Temp: 98 °F (36.7 °C)   Weight: 78.9 kg (174 lb)   Height: 6' (1.829 m)       Wt Readings from Last 3 Encounters:    08/06/18 78.9 kg (173 lb 15.1 oz)   08/06/18 78.9 kg (174 lb)   06/06/18 78.5 kg (173 lb)       APPEARANCE: Well nourished, well developed, in no acute distress.    HEAD: Normocephalic.  Atraumatic.  EYES:   Right eye: Pupil reactive.  Conjunctiva clear.    Left eye: Pupil reactive.  Conjunctiva clear.    NECK: Supple. No bruits.  No JVD.  No cervical lymphadenopathy.  No thyromegaly.    CHEST: Breath sounds clear bilaterally.  Normal respiratory effort  CARDIOVASCULAR: Normal rate.  Regular rhythm.  No murmurs.  No rub.  No gallops.   No edema.  MENTAL STATUS: Alert.  Oriented x 3.  Right great toe with some swelling tenderness palpation distally.  He has subungual hematoma noted. No evidence of infection.  Sensation intact to monofilament testing.  The dorsalis pedis and posterior tibial pulses decreased.    Misha was seen today for follow-up.    Diagnoses and all orders for this visit:    Closed nondisplaced fracture of distal phalanx of right great toe with routine healing, subsequent encounter  -     Ambulatory referral to Podiatry  -     X-Ray Toe 2 or More Views Right; Future    Hypertension associated with diabetes  -     ALT (SGPT); Future    History of adenomatous polyp of colon  -     Case request GI: COLONOSCOPY    Other orders  -     meloxicam (MOBIC) 7.5 MG tablet; Take 1 tablet (7.5 mg total) by mouth once daily.  -     pravastatin (PRAVACHOL) 20 MG tablet; Take 1 tablet (20 mg total) by mouth once daily.     Reviewed most recent laboratory.  And pravastatin.  Add meloxicam.  Continue other medication as currently.  Follow up Podiatry today.

## 2018-08-06 NOTE — LETTER
August 6, 2018    Misha Simpson  [address]  [facility] [address]         [facility]  [address] [facility] [address] [place] [address]  Phone: [phone] August 6, 2018     Patient: Misha Simpson   YOB: 1955   Date of Visit: 8/6/2018       To Whom It May Concern:    It is my medical opinion that Misha Simpson should remain out of work until 09/03/18. He will need to remain in an open toe post op shoe until then and will not be able to wear steel toe boots until then.    If you have any questions or concerns, please don't hesitate to call.    Sincerely,        Flaco Alas DPM

## 2018-08-06 NOTE — LETTER
August 14, 2018      Trino Snyder MD  [address] [facility] [address]           [facility] [address] [facility] [address] [place] [address]  Phone: [phone]          Patient: Misha Simpson   MR Number: [id number]   YOB: 1955   Date of Visit: 8/6/2018       Dear Dr. Trino Snyder:    Thank you for referring Misha Simpson to me for evaluation. Attached you will find relevant portions of my assessment and plan of care.    If you have questions, please do not hesitate to call me. I look forward to following Misha Simpson along with you.    Sincerely,    Flaco Alas, RADHA    Enclosure  CC:  No Recipients    If you would like to receive this communication electronically, please contact [email or web address] or [phone] to request more information on [employer] Link access.    For providers and/or their staff who would like to refer a patient to [facility], please contact us through our one-stop-shop provider referral line, [facility], at [phone].    If you feel you have received this communication in error or would no longer like to receive these types of communications, please e-mail [email or web address]

## 2018-08-14 NOTE — PROGRESS NOTES
Subjective:      Patient ID: Misha Simpson is a 63 y.o. male.    Chief Complaint: Diabetes Mellitus (PCP:  Dr Snyder 8/6/18; HgbA1c: 3/6/18  6.4); Foot Injury (7/20 - hammer hit foot -right great); and Nail Problem (nail falling off)    Misha is a 63 y.o. male who presents to the podiatry clinic  with complaint of  right foot pain great toe. Onset of the symptoms was several days ago. Precipitating event: hammer fell onto his toe. Current symptoms include: ability to bear weight, but with some pain. Aggravating factors: any weight bearing. Symptoms have gradually improved.  Evaluation to date: plain films: comminuted fracture to the distal phalanx right great toe. Treatment to date: ambulating in a darco shoe. Patients rates pain 7/10 on pain scale.    Review of Systems   Constitution: Negative for chills and fever.   Cardiovascular: Negative for claudication and leg swelling.   Respiratory: Negative for shortness of breath.    Skin: Positive for nail changes. Negative for itching and rash.   Musculoskeletal: Negative for muscle cramps, muscle weakness and myalgias.   Gastrointestinal: Negative for nausea and vomiting.   Neurological: Negative for focal weakness, loss of balance, numbness and paresthesias.           Objective:      Physical Exam   Constitutional: He is oriented to person, place, and time. He appears well-developed and well-nourished. No distress.   Cardiovascular:   Pulses:       Dorsalis pedis pulses are 2+ on the right side, and 2+ on the left side.        Posterior tibial pulses are 2+ on the right side, and 2+ on the left side.   < 3 sec capillary refill time to toes 1-5 bilateral. Toes and feet are warm to touch proximally with normal distal cooling b/l. There is some hair growth on the feet and toes b/l. There is no edema b/l. No spider veins or varicosities present b/l.      Musculoskeletal:   Pain with palpation to the distal aspect of the right great toe    Equinus noted b/l ankles with <  10 deg DF noted. MMT 5/5 in DF/PF/Inv/Ev resistance with no reproduction of pain in any direction. Passive range of motion of ankle and pedal joints is painless b/l.     Neurological: He is alert and oriented to person, place, and time. He has normal strength. He displays no atrophy and no tremor. No sensory deficit. He exhibits normal muscle tone.   Negative tinel sign bilateral.   Skin: Skin is warm, dry and intact. No abrasion, no bruising, no burn, no ecchymosis, no laceration, no lesion, no petechiae and no rash noted. He is not diaphoretic. No cyanosis or erythema. No pallor. Nails show no clubbing.   Skin temperature, texture and turgor within normal limits.    Darkened discoloration to about 10% of the nail right great toe at the base.   Psychiatric: He has a normal mood and affect. His behavior is normal.             Assessment:       Encounter Diagnosis   Name Primary?    Nondisplaced fracture of distal phalanx of right great toe, initial encounter for closed fracture Yes         Plan:       Misha was seen today for diabetes mellitus, foot injury and nail problem.    Diagnoses and all orders for this visit:    Nondisplaced fracture of distal phalanx of right great toe, initial encounter for closed fracture  -     X-Ray Toe 2 or More Views Right; Future      I counseled the patient on his conditions, their implications and medical management.    He is to remain weight bearing in the darco shoe only for 6 weeks total    Ice as needed and elevate limb for edema    Return in 1 month with x-rays to ensure proper healing of fracture    Flaco Alas DPM

## 2018-08-16 ENCOUNTER — DOCUMENTATION ONLY (OUTPATIENT)
Dept: ENDOSCOPY | Facility: HOSPITAL | Age: 63
End: 2018-08-16

## 2018-09-10 ENCOUNTER — LAB VISIT (OUTPATIENT)
Dept: LAB | Facility: HOSPITAL | Age: 63
End: 2018-09-10
Attending: FAMILY MEDICINE
Payer: COMMERCIAL

## 2018-09-10 DIAGNOSIS — E11.59 HYPERTENSION ASSOCIATED WITH DIABETES: ICD-10-CM

## 2018-09-10 DIAGNOSIS — I15.2 HYPERTENSION ASSOCIATED WITH DIABETES: ICD-10-CM

## 2018-09-10 DIAGNOSIS — E11.9 TYPE 2 DIABETES MELLITUS WITHOUT COMPLICATION, WITHOUT LONG-TERM CURRENT USE OF INSULIN: ICD-10-CM

## 2018-09-10 LAB
ALT SERPL W/O P-5'-P-CCNC: 17 U/L
ESTIMATED AVG GLUCOSE: 148 MG/DL
HBA1C MFR BLD HPLC: 6.8 %

## 2018-09-10 PROCEDURE — 36415 COLL VENOUS BLD VENIPUNCTURE: CPT | Mod: PO

## 2018-09-10 PROCEDURE — 84460 ALANINE AMINO (ALT) (SGPT): CPT

## 2018-09-10 PROCEDURE — 83036 HEMOGLOBIN GLYCOSYLATED A1C: CPT

## 2018-09-26 ENCOUNTER — OFFICE VISIT (OUTPATIENT)
Dept: PODIATRY | Facility: CLINIC | Age: 63
End: 2018-09-26
Payer: COMMERCIAL

## 2018-09-26 VITALS
HEART RATE: 51 BPM | WEIGHT: 173.94 LBS | BODY MASS INDEX: 23.56 KG/M2 | HEIGHT: 72 IN | DIASTOLIC BLOOD PRESSURE: 81 MMHG | SYSTOLIC BLOOD PRESSURE: 141 MMHG

## 2018-09-26 DIAGNOSIS — S92.424D NONDISPLACED FRACTURE OF DISTAL PHALANX OF RIGHT GREAT TOE, SUBSEQUENT ENCOUNTER FOR FRACTURE WITH ROUTINE HEALING: Primary | ICD-10-CM

## 2018-09-26 PROCEDURE — 99024 POSTOP FOLLOW-UP VISIT: CPT | Mod: S$GLB,,, | Performed by: PODIATRIST

## 2018-09-26 PROCEDURE — 3079F DIAST BP 80-89 MM HG: CPT | Mod: CPTII,S$GLB,, | Performed by: PODIATRIST

## 2018-09-26 PROCEDURE — 3077F SYST BP >= 140 MM HG: CPT | Mod: CPTII,S$GLB,, | Performed by: PODIATRIST

## 2018-09-26 PROCEDURE — 3008F BODY MASS INDEX DOCD: CPT | Mod: CPTII,S$GLB,, | Performed by: PODIATRIST

## 2018-09-26 PROCEDURE — 99999 PR PBB SHADOW E&M-EST. PATIENT-LVL III: CPT | Mod: PBBFAC,,, | Performed by: PODIATRIST

## 2018-09-28 ENCOUNTER — TELEPHONE (OUTPATIENT)
Dept: ADMINISTRATIVE | Facility: OTHER | Age: 63
End: 2018-09-28

## 2018-09-28 NOTE — TELEPHONE ENCOUNTER
----- Message from Isidra Vines sent at 9/28/2018  4:58 PM CDT -----  Mr. Simpson came in claiming he has an xray on his foot due. I do not see any orders in. Patient would like a call regarding when he can come get this xray done.

## 2018-10-01 ENCOUNTER — DOCUMENTATION ONLY (OUTPATIENT)
Dept: ENDOSCOPY | Facility: HOSPITAL | Age: 63
End: 2018-10-01

## 2018-10-01 NOTE — PROGRESS NOTES
Mailed pt a letter requesting call to the office to schedule endoscopy procedure order entered on 8/6/18.

## 2018-10-01 NOTE — TELEPHONE ENCOUNTER
Patient informed that the xray order is in and that it can be done at anytime at any [facility] facility.      Flaco Alas, RADHA Chirinos LPN   Caller: Unspecified (3 days ago,  5:22 PM)             Orders in for x-ray patient can come get the x-ray done at his convenience sorry for the mix up

## 2018-10-05 ENCOUNTER — HOSPITAL ENCOUNTER (OUTPATIENT)
Dept: RADIOLOGY | Facility: HOSPITAL | Age: 63
Discharge: HOME | End: 2018-10-05
Attending: PODIATRIST
Payer: COMMERCIAL

## 2018-10-05 DIAGNOSIS — S92.424D NONDISPLACED FRACTURE OF DISTAL PHALANX OF RIGHT GREAT TOE, SUBSEQUENT ENCOUNTER FOR FRACTURE WITH ROUTINE HEALING: ICD-10-CM

## 2018-10-05 PROCEDURE — 73660 X-RAY EXAM OF TOE(S): CPT | Mod: 26,RT,, | Performed by: RADIOLOGY

## 2018-10-05 PROCEDURE — 73660 X-RAY EXAM OF TOE(S): CPT | Mod: TC,PO,RT

## 2018-10-05 NOTE — PROGRESS NOTES
Subjective:      Patient ID: Misha Simpson is a 63 y.o. male.    Chief Complaint: Toe Injury (f/u right great toe fracture, PCP--08/06/2018) and Diabetes Mellitus (A1c-6.8-09/10/2018)    Misha is a 63 y.o. male who presents to the podiatry clinic  with complaint of  right foot pain great toe. Onset of the symptoms was several days ago. Precipitating event: hammer fell onto his toe. Current symptoms include: ability to bear weight, but with some pain. Aggravating factors: any weight bearing. Symptoms have gradually improved.  Evaluation to date: plain films: comminuted fracture to the distal phalanx right great toe. Treatment to date: ambulating in a darco shoe. Patients rates pain 7/10 on pain scale.    10/4/18: Patient returns for follow up right great toe fracture ambulating in a darco shoe for the last 6 weeks, although he admits to working in his boots without the darco on about every day. There is some pain in the toe still but much improved overall. No new pedal complaints.     Review of Systems   Constitution: Negative for chills and fever.   Cardiovascular: Negative for claudication and leg swelling.   Respiratory: Negative for shortness of breath.    Skin: Positive for nail changes. Negative for itching and rash.   Musculoskeletal: Negative for muscle cramps, muscle weakness and myalgias.   Gastrointestinal: Negative for nausea and vomiting.   Neurological: Negative for focal weakness, loss of balance, numbness and paresthesias.           Objective:      Physical Exam   Constitutional: He is oriented to person, place, and time. He appears well-developed and well-nourished. No distress.   Cardiovascular:   Pulses:       Dorsalis pedis pulses are 2+ on the right side, and 2+ on the left side.        Posterior tibial pulses are 2+ on the right side, and 2+ on the left side.   < 3 sec capillary refill time to toes 1-5 bilateral. Toes and feet are warm to touch proximally with normal distal cooling  b/l. There is some hair growth on the feet and toes b/l. There is no edema b/l. No spider veins or varicosities present b/l.      Musculoskeletal:   Pain resolving with palpation to the distal aspect of the right great toe    Equinus noted b/l ankles with < 10 deg DF noted. MMT 5/5 in DF/PF/Inv/Ev resistance with no reproduction of pain in any direction. Passive range of motion of ankle and pedal joints is painless b/l.     Neurological: He is alert and oriented to person, place, and time. He has normal strength. He displays no atrophy and no tremor. No sensory deficit. He exhibits normal muscle tone.   Negative tinel sign bilateral.   Skin: Skin is warm, dry and intact. No abrasion, no bruising, no burn, no ecchymosis, no laceration, no lesion, no petechiae and no rash noted. He is not diaphoretic. No cyanosis or erythema. No pallor. Nails show no clubbing.   Skin temperature, texture and turgor within normal limits.    Darkened discoloration to about 10% of the nail right great toe at the base.   Psychiatric: He has a normal mood and affect. His behavior is normal.             Assessment:       Encounter Diagnosis   Name Primary?    Nondisplaced fracture of distal phalanx of right great toe, initial encounter for closed fracture Yes         Plan:       Misha was seen today for toe injury and diabetes mellitus.    Diagnoses and all orders for this visit:    Nondisplaced fracture of distal phalanx of right great toe, initial encounter for closed fracture  -     X-Ray Toe 2 or More Views Right; Future      I counseled the patient on his conditions, their implications and medical management.    He can transition to toleration out of the darco shoe, will get x-rays to confirm healing. He was supposed to come in and get them before the appointment but had forgotten, orders are in    Return PILAR Alas DPM

## 2018-11-21 ENCOUNTER — CLINICAL SUPPORT (OUTPATIENT)
Dept: SMOKING CESSATION | Facility: CLINIC | Age: 63
End: 2018-11-21
Payer: COMMERCIAL

## 2018-11-21 DIAGNOSIS — F17.200 NICOTINE DEPENDENCE: Primary | ICD-10-CM

## 2018-11-21 PROCEDURE — 99406 BEHAV CHNG SMOKING 3-10 MIN: CPT | Mod: S$GLB,,,

## 2018-11-21 NOTE — PROGRESS NOTES
Spoke with patient today in regard to smoking cessation progress 12 month phone follow up, states not tobacco free. Patient is not interested in returning to the smoking cessation program at this time. Will call when ready to schedule. Informed patient of benefit period, and contact information. Will complete and resolve smart form for 12 month phone follow up on Quit attempt #1.

## 2018-12-27 RX ORDER — HYDROCHLOROTHIAZIDE 12.5 MG/1
CAPSULE ORAL
Qty: 90 CAPSULE | Refills: 0 | Status: SHIPPED | OUTPATIENT
Start: 2018-12-27 | End: 2019-03-11 | Stop reason: SDUPTHER

## 2019-01-04 DIAGNOSIS — E11.59 HYPERTENSION ASSOCIATED WITH DIABETES: Primary | ICD-10-CM

## 2019-01-04 DIAGNOSIS — I15.2 HYPERTENSION ASSOCIATED WITH DIABETES: Primary | ICD-10-CM

## 2019-01-04 RX ORDER — BENAZEPRIL HYDROCHLORIDE 40 MG/1
40 TABLET ORAL DAILY
Qty: 90 TABLET | Refills: 0 | Status: SHIPPED | OUTPATIENT
Start: 2019-01-04 | End: 2019-03-11 | Stop reason: SDUPTHER

## 2019-01-07 ENCOUNTER — OFFICE VISIT (OUTPATIENT)
Dept: PODIATRY | Facility: CLINIC | Age: 64
End: 2019-01-07
Payer: COMMERCIAL

## 2019-01-07 ENCOUNTER — HOSPITAL ENCOUNTER (OUTPATIENT)
Dept: RADIOLOGY | Facility: HOSPITAL | Age: 64
Discharge: HOME | End: 2019-01-07
Attending: PODIATRIST
Payer: COMMERCIAL

## 2019-01-07 VITALS — HEIGHT: 72 IN | WEIGHT: 179.44 LBS | BODY MASS INDEX: 24.3 KG/M2

## 2019-01-07 DIAGNOSIS — M19.071 DEGENERATIVE ARTHRITIS OF TOE JOINT, RIGHT: ICD-10-CM

## 2019-01-07 DIAGNOSIS — S92.424S CLOSED NONDISPLACED FRACTURE OF DISTAL PHALANX OF RIGHT GREAT TOE, SEQUELA: Primary | ICD-10-CM

## 2019-01-07 DIAGNOSIS — S92.424S CLOSED NONDISPLACED FRACTURE OF DISTAL PHALANX OF RIGHT GREAT TOE, SEQUELA: ICD-10-CM

## 2019-01-07 PROCEDURE — 73660 X-RAY EXAM OF TOE(S): CPT | Mod: TC,PO,RT

## 2019-01-07 PROCEDURE — 3008F BODY MASS INDEX DOCD: CPT | Mod: CPTII,S$GLB,, | Performed by: PODIATRIST

## 2019-01-07 PROCEDURE — 99213 OFFICE O/P EST LOW 20 MIN: CPT | Mod: 25,S$GLB,, | Performed by: PODIATRIST

## 2019-01-07 PROCEDURE — 99213 PR OFFICE/OUTPT VISIT, EST, LEVL III, 20-29 MIN: ICD-10-PCS | Mod: 25,S$GLB,, | Performed by: PODIATRIST

## 2019-01-07 PROCEDURE — 73660 XR TOE 2 OR MORE VIEWS RIGHT: ICD-10-PCS | Mod: 26,RT,, | Performed by: RADIOLOGY

## 2019-01-07 PROCEDURE — 3008F PR BODY MASS INDEX (BMI) DOCUMENTED: ICD-10-PCS | Mod: CPTII,S$GLB,, | Performed by: PODIATRIST

## 2019-01-07 PROCEDURE — 20600 DRAIN/INJ JOINT/BURSA W/O US: CPT | Mod: T5,S$GLB,, | Performed by: PODIATRIST

## 2019-01-07 PROCEDURE — 99999 PR PBB SHADOW E&M-EST. PATIENT-LVL III: CPT | Mod: PBBFAC,,, | Performed by: PODIATRIST

## 2019-01-07 PROCEDURE — 73660 X-RAY EXAM OF TOE(S): CPT | Mod: 26,RT,, | Performed by: RADIOLOGY

## 2019-01-07 PROCEDURE — 99999 PR PBB SHADOW E&M-EST. PATIENT-LVL III: ICD-10-PCS | Mod: PBBFAC,,, | Performed by: PODIATRIST

## 2019-01-07 PROCEDURE — 20600 PR DRAIN/INJECT SMALL JOINT/BURSA: ICD-10-PCS | Mod: T5,S$GLB,, | Performed by: PODIATRIST

## 2019-01-07 RX ORDER — DEXAMETHASONE SODIUM PHOSPHATE 4 MG/ML
2 INJECTION, SOLUTION INTRA-ARTICULAR; INTRALESIONAL; INTRAMUSCULAR; INTRAVENOUS; SOFT TISSUE ONCE
Status: COMPLETED | OUTPATIENT
Start: 2019-01-07 | End: 2019-01-07

## 2019-01-07 RX ADMIN — DEXAMETHASONE SODIUM PHOSPHATE 2 MG: 4 INJECTION, SOLUTION INTRA-ARTICULAR; INTRALESIONAL; INTRAMUSCULAR; INTRAVENOUS; SOFT TISSUE at 02:01

## 2019-01-07 NOTE — PROGRESS NOTES
Subjective:      Patient ID: Misha Simpson is a 63 y.o. male.    Chief Complaint: Nail Problem (f/u right great toe fracture, nail came off, PCP--08/06/2018) and Diabetes Mellitus (A1c-6.8-09/10/2018)    Misha is a 63 y.o. male who presents to the podiatry clinic  with complaint of  right foot pain great toe. Onset of the symptoms was several days ago. Precipitating event: hammer fell onto his toe. Current symptoms include: ability to bear weight, but with some pain. Aggravating factors: any weight bearing. Symptoms have gradually improved.  Evaluation to date: plain films: comminuted fracture to the distal phalanx right great toe. Treatment to date: ambulating in a darco shoe. Patients rates pain 7/10 on pain scale.    10/4/18: Patient returns for follow up right great toe fracture ambulating in a darco shoe for the last 6 weeks, although he admits to working in his boots without the darco on about every day. There is some pain in the toe still but much improved overall. No new pedal complaints.     Review of Systems   Constitution: Negative for chills and fever.   Cardiovascular: Negative for claudication and leg swelling.   Respiratory: Negative for shortness of breath.    Skin: Positive for nail changes. Negative for itching and rash.   Musculoskeletal: Negative for muscle cramps, muscle weakness and myalgias.   Gastrointestinal: Negative for nausea and vomiting.   Neurological: Negative for focal weakness, loss of balance, numbness and paresthesias.           Objective:      Physical Exam   Constitutional: He is oriented to person, place, and time. He appears well-developed and well-nourished. No distress.   Cardiovascular:   Pulses:       Dorsalis pedis pulses are 2+ on the right side, and 2+ on the left side.        Posterior tibial pulses are 2+ on the right side, and 2+ on the left side.   < 3 sec capillary refill time to toes 1-5 bilateral. Toes and feet are warm to touch proximally with normal  distal cooling b/l. There is some hair growth on the feet and toes b/l. There is no edema b/l. No spider veins or varicosities present b/l.      Musculoskeletal:   There is some pain with ROM at the IPJ of the right great toe.    Equinus noted b/l ankles with < 10 deg DF noted. MMT 5/5 in DF/PF/Inv/Ev resistance with no reproduction of pain in any direction. Passive range of motion of ankle and pedal joints is painless b/l.     Neurological: He is alert and oriented to person, place, and time. He has normal strength. He displays no atrophy and no tremor. No sensory deficit. He exhibits normal muscle tone.   Negative tinel sign bilateral.   Skin: Skin is warm, dry and intact. No abrasion, no bruising, no burn, no ecchymosis, no laceration, no lesion, no petechiae and no rash noted. He is not diaphoretic. No cyanosis or erythema. No pallor. Nails show no clubbing.   Skin temperature, texture and turgor within normal limits.    Right great toe nail has come off with a new nail growing in at the base, no pain to palpation.    Psychiatric: He has a normal mood and affect. His behavior is normal.             Assessment:       Encounter Diagnosis   Name Primary?    Closed nondisplaced fracture of distal phalanx of right great toe, sequela Yes         Plan:       Misha was seen today for nail problem and diabetes mellitus.    Diagnoses and all orders for this visit:    Closed nondisplaced fracture of distal phalanx of right great toe, sequela  -     X-Ray Toe 2 or More Views Right; Future    Other orders  -     dexamethasone injection 2 mg      I counseled the patient on his conditions, their implications and medical management.    Will get repeat x-rays today to ensure the bone has healed properly    Obtained verbal consent from the patient to inject the affected hallux IP joint right foot. Skin prepped with alcohol and skin anesthesia achieved with ethyl chloride. Injected 2 mg dexamethasone with 0.5 mL 1% plain  lidocaine. Patient reported relief with the injection. Patient tolerated the procedure and anesthesia well without any complications.    Discussed surgically fusing the IPJ if pain gets worse or does not calm    Return in 2 months follow up care    Flaco Alas DPM

## 2019-01-07 NOTE — LETTER
January 7, 2019      [facility]  [address] [facility] [address] [place] [address]  Phone: [phone]       Patient: Misha Simpson   YOB: 1955  Date of Visit: 01/07/2019    To Whom It May Concern:    Janet Simpson  was at [facility] on 01/07/2019. He may return to work with no restrictions. If you have any questions or concerns, or if I can be of further assistance, please do not hesitate to contact me.    Sincerely,      Flaco Alas DPM

## 2019-03-04 ENCOUNTER — LAB VISIT (OUTPATIENT)
Dept: LAB | Facility: HOSPITAL | Age: 64
End: 2019-03-04
Attending: FAMILY MEDICINE
Payer: COMMERCIAL

## 2019-03-04 DIAGNOSIS — E11.9 TYPE 2 DIABETES MELLITUS WITHOUT COMPLICATION, WITHOUT LONG-TERM CURRENT USE OF INSULIN: ICD-10-CM

## 2019-03-04 LAB
ALBUMIN SERPL BCP-MCNC: 3.7 G/DL
ALP SERPL-CCNC: 89 U/L
ALT SERPL W/O P-5'-P-CCNC: 18 U/L
ANION GAP SERPL CALC-SCNC: 5 MMOL/L
AST SERPL-CCNC: 35 U/L
BILIRUB SERPL-MCNC: 0.9 MG/DL
BUN SERPL-MCNC: 13 MG/DL
CALCIUM SERPL-MCNC: 9.5 MG/DL
CHLORIDE SERPL-SCNC: 104 MMOL/L
CHOLEST SERPL-MCNC: 144 MG/DL
CHOLEST/HDLC SERPL: 3.5 {RATIO}
CO2 SERPL-SCNC: 26 MMOL/L
CREAT SERPL-MCNC: 1.2 MG/DL
EST. GFR  (AFRICAN AMERICAN): >60 ML/MIN/1.73 M^2
EST. GFR  (NON AFRICAN AMERICAN): >60 ML/MIN/1.73 M^2
ESTIMATED AVG GLUCOSE: 246 MG/DL
GLUCOSE SERPL-MCNC: 254 MG/DL
HBA1C MFR BLD HPLC: 10.2 %
HDLC SERPL-MCNC: 41 MG/DL
HDLC SERPL: 28.5 %
LDLC SERPL CALC-MCNC: 86.6 MG/DL
NONHDLC SERPL-MCNC: 103 MG/DL
POTASSIUM SERPL-SCNC: 4.5 MMOL/L
PROT SERPL-MCNC: 7.2 G/DL
SODIUM SERPL-SCNC: 135 MMOL/L
TRIGL SERPL-MCNC: 82 MG/DL

## 2019-03-04 PROCEDURE — 83036 HEMOGLOBIN GLYCOSYLATED A1C: CPT

## 2019-03-04 PROCEDURE — 80053 COMPREHEN METABOLIC PANEL: CPT

## 2019-03-04 PROCEDURE — 36415 COLL VENOUS BLD VENIPUNCTURE: CPT | Mod: PO

## 2019-03-04 PROCEDURE — 80061 LIPID PANEL: CPT

## 2019-03-08 ENCOUNTER — OFFICE VISIT (OUTPATIENT)
Dept: PODIATRY | Facility: CLINIC | Age: 64
End: 2019-03-08
Payer: COMMERCIAL

## 2019-03-08 VITALS
DIASTOLIC BLOOD PRESSURE: 77 MMHG | WEIGHT: 179.25 LBS | BODY MASS INDEX: 24.28 KG/M2 | HEART RATE: 59 BPM | SYSTOLIC BLOOD PRESSURE: 126 MMHG | HEIGHT: 72 IN

## 2019-03-08 DIAGNOSIS — E11.9 TYPE 2 DIABETES MELLITUS WITHOUT COMPLICATION, WITHOUT LONG-TERM CURRENT USE OF INSULIN: ICD-10-CM

## 2019-03-08 DIAGNOSIS — S92.424D NONDISPLACED FRACTURE OF DISTAL PHALANX OF RIGHT GREAT TOE, SUBSEQUENT ENCOUNTER FOR FRACTURE WITH ROUTINE HEALING: Primary | ICD-10-CM

## 2019-03-08 PROCEDURE — 99999 PR PBB SHADOW E&M-EST. PATIENT-LVL III: ICD-10-PCS | Mod: PBBFAC,,, | Performed by: PODIATRIST

## 2019-03-08 PROCEDURE — 99212 PR OFFICE/OUTPT VISIT, EST, LEVL II, 10-19 MIN: ICD-10-PCS | Mod: S$GLB,,, | Performed by: PODIATRIST

## 2019-03-08 PROCEDURE — 3008F PR BODY MASS INDEX (BMI) DOCUMENTED: ICD-10-PCS | Mod: CPTII,S$GLB,, | Performed by: PODIATRIST

## 2019-03-08 PROCEDURE — 3074F PR MOST RECENT SYSTOLIC BLOOD PRESSURE < 130 MM HG: ICD-10-PCS | Mod: CPTII,S$GLB,, | Performed by: PODIATRIST

## 2019-03-08 PROCEDURE — 3046F HEMOGLOBIN A1C LEVEL >9.0%: CPT | Mod: CPTII,S$GLB,, | Performed by: PODIATRIST

## 2019-03-08 PROCEDURE — 3078F PR MOST RECENT DIASTOLIC BLOOD PRESSURE < 80 MM HG: ICD-10-PCS | Mod: CPTII,S$GLB,, | Performed by: PODIATRIST

## 2019-03-08 PROCEDURE — 99212 OFFICE O/P EST SF 10 MIN: CPT | Mod: S$GLB,,, | Performed by: PODIATRIST

## 2019-03-08 PROCEDURE — 3008F BODY MASS INDEX DOCD: CPT | Mod: CPTII,S$GLB,, | Performed by: PODIATRIST

## 2019-03-08 PROCEDURE — 99999 PR PBB SHADOW E&M-EST. PATIENT-LVL III: CPT | Mod: PBBFAC,,, | Performed by: PODIATRIST

## 2019-03-08 PROCEDURE — 3074F SYST BP LT 130 MM HG: CPT | Mod: CPTII,S$GLB,, | Performed by: PODIATRIST

## 2019-03-08 PROCEDURE — 3078F DIAST BP <80 MM HG: CPT | Mod: CPTII,S$GLB,, | Performed by: PODIATRIST

## 2019-03-08 PROCEDURE — 3046F PR MOST RECENT HEMOGLOBIN A1C LEVEL > 9.0%: ICD-10-PCS | Mod: CPTII,S$GLB,, | Performed by: PODIATRIST

## 2019-03-11 ENCOUNTER — OFFICE VISIT (OUTPATIENT)
Dept: FAMILY MEDICINE | Facility: CLINIC | Age: 64
End: 2019-03-11
Payer: COMMERCIAL

## 2019-03-11 VITALS
BODY MASS INDEX: 23.68 KG/M2 | SYSTOLIC BLOOD PRESSURE: 111 MMHG | DIASTOLIC BLOOD PRESSURE: 76 MMHG | WEIGHT: 174.81 LBS | HEART RATE: 53 BPM | HEIGHT: 72 IN | TEMPERATURE: 98 F

## 2019-03-11 DIAGNOSIS — J47.9 BRONCHIECTASIS WITHOUT COMPLICATION: ICD-10-CM

## 2019-03-11 DIAGNOSIS — Z91.148 NONCOMPLIANCE WITH MEDICATION TREATMENT DUE TO UNDERUSE OF MEDICATION: ICD-10-CM

## 2019-03-11 DIAGNOSIS — E11.69 COMBINED HYPERLIPIDEMIA ASSOCIATED WITH TYPE 2 DIABETES MELLITUS: ICD-10-CM

## 2019-03-11 DIAGNOSIS — E11.59 HYPERTENSION ASSOCIATED WITH DIABETES: ICD-10-CM

## 2019-03-11 DIAGNOSIS — I15.2 HYPERTENSION ASSOCIATED WITH DIABETES: ICD-10-CM

## 2019-03-11 DIAGNOSIS — E78.2 COMBINED HYPERLIPIDEMIA ASSOCIATED WITH TYPE 2 DIABETES MELLITUS: ICD-10-CM

## 2019-03-11 DIAGNOSIS — J43.9 PULMONARY EMPHYSEMA, UNSPECIFIED EMPHYSEMA TYPE: ICD-10-CM

## 2019-03-11 DIAGNOSIS — E11.9 TYPE 2 DIABETES MELLITUS WITHOUT COMPLICATION, WITHOUT LONG-TERM CURRENT USE OF INSULIN: Primary | ICD-10-CM

## 2019-03-11 LAB — GLUCOSE SERPL-MCNC: 308 MG/DL (ref 70–110)

## 2019-03-11 PROCEDURE — 82962 POCT GLUCOSE, HAND-HELD DEVICE: ICD-10-PCS | Mod: S$GLB,,, | Performed by: FAMILY MEDICINE

## 2019-03-11 PROCEDURE — 3078F PR MOST RECENT DIASTOLIC BLOOD PRESSURE < 80 MM HG: ICD-10-PCS | Mod: CPTII,S$GLB,, | Performed by: FAMILY MEDICINE

## 2019-03-11 PROCEDURE — 3074F PR MOST RECENT SYSTOLIC BLOOD PRESSURE < 130 MM HG: ICD-10-PCS | Mod: CPTII,S$GLB,, | Performed by: FAMILY MEDICINE

## 2019-03-11 PROCEDURE — 82962 GLUCOSE BLOOD TEST: CPT | Mod: S$GLB,,, | Performed by: FAMILY MEDICINE

## 2019-03-11 PROCEDURE — 3046F PR MOST RECENT HEMOGLOBIN A1C LEVEL > 9.0%: ICD-10-PCS | Mod: CPTII,S$GLB,, | Performed by: FAMILY MEDICINE

## 2019-03-11 PROCEDURE — 3008F BODY MASS INDEX DOCD: CPT | Mod: CPTII,S$GLB,, | Performed by: FAMILY MEDICINE

## 2019-03-11 PROCEDURE — 3046F HEMOGLOBIN A1C LEVEL >9.0%: CPT | Mod: CPTII,S$GLB,, | Performed by: FAMILY MEDICINE

## 2019-03-11 PROCEDURE — 99999 PR PBB SHADOW E&M-EST. PATIENT-LVL III: CPT | Mod: PBBFAC,,, | Performed by: FAMILY MEDICINE

## 2019-03-11 PROCEDURE — 3074F SYST BP LT 130 MM HG: CPT | Mod: CPTII,S$GLB,, | Performed by: FAMILY MEDICINE

## 2019-03-11 PROCEDURE — 3008F PR BODY MASS INDEX (BMI) DOCUMENTED: ICD-10-PCS | Mod: CPTII,S$GLB,, | Performed by: FAMILY MEDICINE

## 2019-03-11 PROCEDURE — 99214 OFFICE O/P EST MOD 30 MIN: CPT | Mod: S$GLB,,, | Performed by: FAMILY MEDICINE

## 2019-03-11 PROCEDURE — 99214 PR OFFICE/OUTPT VISIT, EST, LEVL IV, 30-39 MIN: ICD-10-PCS | Mod: S$GLB,,, | Performed by: FAMILY MEDICINE

## 2019-03-11 PROCEDURE — 3078F DIAST BP <80 MM HG: CPT | Mod: CPTII,S$GLB,, | Performed by: FAMILY MEDICINE

## 2019-03-11 PROCEDURE — 99999 PR PBB SHADOW E&M-EST. PATIENT-LVL III: ICD-10-PCS | Mod: PBBFAC,,, | Performed by: FAMILY MEDICINE

## 2019-03-11 RX ORDER — METFORMIN HYDROCHLORIDE 1000 MG/1
1000 TABLET ORAL 2 TIMES DAILY WITH MEALS
Qty: 180 TABLET | Refills: 1 | Status: SHIPPED | OUTPATIENT
Start: 2019-03-11 | End: 2019-06-17 | Stop reason: DRUGHIGH

## 2019-03-11 RX ORDER — DEXTROSE 4 G
TABLET,CHEWABLE ORAL
Qty: 1 EACH | Status: SHIPPED | OUTPATIENT
Start: 2019-03-11

## 2019-03-11 RX ORDER — BENAZEPRIL HYDROCHLORIDE 40 MG/1
40 TABLET ORAL DAILY
Qty: 90 TABLET | Refills: 3 | Status: SHIPPED | OUTPATIENT
Start: 2019-03-11 | End: 2020-03-15

## 2019-03-11 RX ORDER — HYDROCHLOROTHIAZIDE 12.5 MG/1
12.5 CAPSULE ORAL DAILY
Qty: 90 CAPSULE | Refills: 3 | Status: SHIPPED | OUTPATIENT
Start: 2019-03-11 | End: 2020-03-15

## 2019-03-11 RX ORDER — LANCETS
EACH MISCELLANEOUS
Qty: 100 EACH | Status: SHIPPED | OUTPATIENT
Start: 2019-03-11

## 2019-03-11 NOTE — PROGRESS NOTES
Follow-up diabetes.  Poorly controlled.  He did not bring in glucose reading, but states between 40 and 120.  No symptoms of hypoglycemia.  Recent laboratory reviewed with elevated sugars.  States compliance medication however last prescription that we wrote for metformin was in 2017.  He did bring in a bottle today with pills which was filled approximately 6 months ago.  He does state that he stopped taking Trulicity because it upset his stomach.  He did quit smoking.  Breathing okay.  Occasional albuterol use.    Diabetes Management Status    Statin: Taking  ACE/ARB: Taking    Screening or Prevention Patient's value Goal Complete/Controlled?   HgA1C Testing and Control   Lab Results   Component Value Date    HGBA1C 10.2 (H) 03/04/2019      Annually/Less than 8% No   Lipid profile : 03/04/2019 Annually Yes   LDL control Lab Results   Component Value Date    LDLCALC 86.6 03/04/2019    Annually/Less than 100 mg/dl  Yes   Nephropathy screening Lab Results   Component Value Date    LABMICR 6.0 03/04/2019     No results found for: PROTEINUA Annually Yes   Blood pressure BP Readings from Last 1 Encounters:   03/11/19 111/76    Less than 140/90 Yes   Dilated retinal exam : 12/12/2017 Annually Yes   Foot exam   : 03/08/2019 Annually Yes       Past Medical History:  Past Medical History:   Diagnosis Date    Adrenal adenoma 2008    bx benign    Arthritis     Bronchiectasis 5/15/2012    Bronchiectasis 5/15/2012    Cataract     COPD, mild 10/2/2017    Diabetes mellitus, type 2     Emphysema     Hemoptysis     Hyperlipidemia     Hyperplastic colon polyp     Hypertension     Lung nodules     small stable on serial ct    Lymphocytosis     reactive    Peptic ulcer disease     Pneumonia     Tobacco abuse 5/15/2012     Past Surgical History:   Procedure Laterality Date    APPENDECTOMY      ARTHROSCOPY-KNEE W/ CHONDROPLASTY Right 12/14/2017    [facility]     ARTHROSCOPY-MENISCECTOMY Right 2017    [facility]    BONE MARROW BIOPSY      BRONCHOSCOPY      COLONOSCOPY  2013         KNEE ARTHROSCOPY Left     MULTIPLE TOOTH EXTRACTIONS      RELEASE-LATERAL Right 2017    [facility] OR     Social History     Socioeconomic History    Marital status:      Spouse name: Not on file    Number of children: Not on file    Years of education: Not on file    Highest education level: Not on file   Social Needs    Financial resource strain: Not on file    Food insecurity - worry: Not on file    Food insecurity - inability: Not on file    Transportation needs - medical: Not on file    Transportation needs - non-medical: Not on file   Occupational History    Not on file   Tobacco Use    Smoking status: Former Smoker     Packs/day: 0.25     Years: 53.00     Pack years: 13.25     Types: Cigarettes     Last attempt to quit: 2018     Years since quittin.2    Smokeless tobacco: Never Used    Tobacco comment: 1 pack every 3 days   Substance and Sexual Activity    Alcohol use: No     Alcohol/week: 0.6 oz     Types: 1 Cans of beer per week     Comment: 1 beer every 2 weeks    Drug use: No    Sexual activity: Not on file   Other Topics Concern    Not on file   Social History Narrative    Not on file     Family History   Problem Relation Age of Onset    Kidney failure Father     Diabetes Father     Heart disease Mother 60    Diabetes Brother     Cancer Brother         prostate    Glaucoma Brother         ?    Cancer Brother     Amblyopia Neg Hx     Blindness Neg Hx     Cataracts Neg Hx     Hypertension Neg Hx     Macular degeneration Neg Hx     Retinal detachment Neg Hx     Strabismus Neg Hx     Stroke Neg Hx     Thyroid disease Neg Hx      Review of patient's allergies indicates:   Allergen Reactions    Aspirin      Other reaction(s): Stomach upset  Other  reaction(s): Nausea     Current Outpatient Medications on File Prior to Visit   Medication Sig Dispense Refill    ALBUTEROL INHL Inhale into the lungs.      amLODIPine (NORVASC) 5 MG tablet TAKE 1 TABLET BY MOUTH ONCE DAILY 90 tablet 0    blood sugar diagnostic (BLOOD GLUCOSE TEST) Strp Test glucose 3 x daily 100 strip 11    lancets Misc As directed 100 each 11    pravastatin (PRAVACHOL) 20 MG tablet Take 1 tablet (20 mg total) by mouth once daily. 90 tablet 3    [DISCONTINUED] benazepril (LOTENSIN) 40 MG tablet Take 1 tablet (40 mg total) by mouth once daily. 90 tablet 0    [DISCONTINUED] hydroCHLOROthiazide (MICROZIDE) 12.5 mg capsule TAKE ONE CAPSULE BY MOUTH DAILY 90 capsule 0    [DISCONTINUED] metformin (GLUCOPHAGE) 1000 MG tablet Take 1 tablet (1,000 mg total) by mouth 2 (two) times daily with meals. 60 tablet 5    [DISCONTINUED] dulaglutide (TRULICITY) 0.75 mg/0.5 mL PnIj Inject 0.5 mLs (0.75 mg total) into the skin once a week. 4 Syringe 6    [DISCONTINUED] meloxicam (MOBIC) 7.5 MG tablet Take 1 tablet (7.5 mg total) by mouth once daily. 30 tablet 1     No current facility-administered medications on file prior to visit.        ROS:  GENERAL: No fever, chills,  or significant weight changes.   CARDIOVASCULAR: Denies chest pain, PND, orthopnea or reduced exercise tolerance.  ABDOMEN: Appetite fine. Denies diarrhea, abdominal pain, hematemesis or blood in stool.  URINARY: No flank pain, dysuria or hematuria.    Vitals:    03/11/19 0903   BP: 111/76   Pulse: (!) 53   Temp: 97.9 °F (36.6 °C)   Weight: 79.3 kg (174 lb 12.8 oz)   Height: 6' (1.829 m)       Wt Readings from Last 3 Encounters:   03/11/19 79.3 kg (174 lb 12.8 oz)   03/08/19 81.3 kg (179 lb 3.7 oz)   01/07/19 81.4 kg (179 lb 7.3 oz)       APPEARANCE: Well nourished, well developed, in no acute distress.    HEAD: Normocephalic.  Atraumatic.  EYES:   Right eye: Pupil reactive.  Conjunctiva clear.    Left eye: Pupil reactive.  Conjunctiva  clear.    NECK: Supple. No bruits.  No JVD.  No cervical lymphadenopathy.  No thyromegaly.    CHEST: Breath sounds clear bilaterally.  Normal respiratory effort  CARDIOVASCULAR: Normal rate.  Regular rhythm.  No murmurs.  No rub.  No gallops.   No edema.  MENTAL STATUS: Alert.  Oriented x 3.    Random glucose 308    Diagnoses and all orders for this visit:    Type 2 diabetes mellitus without complication, without long-term current use of insulin  -     POCT Glucose, Hand-Held Device  -     Ambulatory referral to Optometry  -     metFORMIN (GLUCOPHAGE) 1000 MG tablet; Take 1 tablet (1,000 mg total) by mouth 2 (two) times daily with meals.    Combined hyperlipidemia associated with type 2 diabetes mellitus    Pulmonary emphysema, unspecified emphysema type    Bronchiectasis without complication    Hypertension associated with diabetes  -     benazepril (LOTENSIN) 40 MG tablet; Take 1 tablet (40 mg total) by mouth once daily.    Noncompliance with medication treatment due to underuse of medication    Other orders  -     hydroCHLOROthiazide (MICROZIDE) 12.5 mg capsule; Take 1 capsule (12.5 mg total) by mouth once daily.  -     blood sugar diagnostic (BLOOD GLUCOSE TEST) Strp; Test glucose 1 x daily  -     blood-glucose meter Misc; Use as directed  -     lancets Misc; As directed      Does not appear to be compliant with diabetes medications.  Also glucose meter does not appear to be accurate.  Compliance encouraged with medications.  Follow-up appointment in 2 weeks with home glucose readings.  He will resume the metformin twice a day 1000 mg.  Will not restart Trulicity.  Recheck hemoglobin A1c 3 months.

## 2019-03-12 ENCOUNTER — PATIENT OUTREACH (OUTPATIENT)
Dept: ADMINISTRATIVE | Facility: HOSPITAL | Age: 64
End: 2019-03-12

## 2019-03-12 NOTE — LETTER
March 12, 2019        Misha Simpson  [address]  [facility] [address]      Dear Mr. Simpson,    You have an upcoming appointment with Trino Snyder MD on 3/25/19 @ 9:20 am.      Your chart is indicating you may be due for the following and I will be happy to assist you in scheduling any needed appointments:  Health Maintenance Due   Topic    Eye Exam      If you have had any of the above done at another facility, please bring the records or information with you so that your record at [facility] will be complete.    We will be happy to assist you with scheduling any necessary appointments or you may contact the [facility] appointment desk at [phone] to schedule at your convenience.     Thank you for choosing [facility] for your healthcare needs,      RYLEY Sanchez  Care Coordination Department  [facility]  [phone]

## 2019-03-17 PROBLEM — S92.424A NONDISPLACED FRACTURE OF DISTAL PHALANX OF RIGHT GREAT TOE, INITIAL ENCOUNTER FOR CLOSED FRACTURE: Status: RESOLVED | Noted: 2019-03-17 | Resolved: 2019-03-17

## 2019-03-17 PROBLEM — S92.424A NONDISPLACED FRACTURE OF DISTAL PHALANX OF RIGHT GREAT TOE, INITIAL ENCOUNTER FOR CLOSED FRACTURE: Status: ACTIVE | Noted: 2019-03-17

## 2019-03-25 ENCOUNTER — CLINICAL SUPPORT (OUTPATIENT)
Dept: FAMILY MEDICINE | Facility: CLINIC | Age: 64
End: 2019-03-25
Payer: COMMERCIAL

## 2019-03-25 ENCOUNTER — OFFICE VISIT (OUTPATIENT)
Dept: FAMILY MEDICINE | Facility: CLINIC | Age: 64
End: 2019-03-25
Payer: COMMERCIAL

## 2019-03-25 ENCOUNTER — TELEPHONE (OUTPATIENT)
Dept: FAMILY MEDICINE | Facility: CLINIC | Age: 64
End: 2019-03-25

## 2019-03-25 VITALS
DIASTOLIC BLOOD PRESSURE: 81 MMHG | HEART RATE: 64 BPM | HEIGHT: 72 IN | SYSTOLIC BLOOD PRESSURE: 122 MMHG | BODY MASS INDEX: 24.2 KG/M2 | WEIGHT: 178.63 LBS

## 2019-03-25 DIAGNOSIS — E11.9 TYPE 2 DIABETES MELLITUS WITHOUT COMPLICATION, WITHOUT LONG-TERM CURRENT USE OF INSULIN: Primary | ICD-10-CM

## 2019-03-25 DIAGNOSIS — R07.89 FEELING OF CHEST TIGHTNESS: ICD-10-CM

## 2019-03-25 DIAGNOSIS — M17.0 PRIMARY OSTEOARTHRITIS OF BOTH KNEES: ICD-10-CM

## 2019-03-25 DIAGNOSIS — J44.9 COPD, MILD: ICD-10-CM

## 2019-03-25 DIAGNOSIS — J47.9 BRONCHIECTASIS WITHOUT COMPLICATION: ICD-10-CM

## 2019-03-25 PROCEDURE — 99999 PR PBB SHADOW E&M-EST. PATIENT-LVL IV: CPT | Mod: PBBFAC,,, | Performed by: FAMILY MEDICINE

## 2019-03-25 PROCEDURE — 3008F BODY MASS INDEX DOCD: CPT | Mod: CPTII,S$GLB,, | Performed by: FAMILY MEDICINE

## 2019-03-25 PROCEDURE — 93010 EKG 12-LEAD: ICD-10-PCS | Mod: S$GLB,,, | Performed by: NUCLEAR MEDICINE

## 2019-03-25 PROCEDURE — 99214 PR OFFICE/OUTPT VISIT, EST, LEVL IV, 30-39 MIN: ICD-10-PCS | Mod: S$GLB,,, | Performed by: FAMILY MEDICINE

## 2019-03-25 PROCEDURE — 93010 ELECTROCARDIOGRAM REPORT: CPT | Mod: S$GLB,,, | Performed by: NUCLEAR MEDICINE

## 2019-03-25 PROCEDURE — 99999 PR PBB SHADOW E&M-EST. PATIENT-LVL IV: ICD-10-PCS | Mod: PBBFAC,,, | Performed by: FAMILY MEDICINE

## 2019-03-25 PROCEDURE — 99499 UNLISTED E&M SERVICE: CPT | Mod: S$GLB,,, | Performed by: FAMILY MEDICINE

## 2019-03-25 PROCEDURE — 99214 OFFICE O/P EST MOD 30 MIN: CPT | Mod: S$GLB,,, | Performed by: FAMILY MEDICINE

## 2019-03-25 PROCEDURE — 3079F DIAST BP 80-89 MM HG: CPT | Mod: CPTII,S$GLB,, | Performed by: FAMILY MEDICINE

## 2019-03-25 PROCEDURE — 3074F SYST BP LT 130 MM HG: CPT | Mod: CPTII,S$GLB,, | Performed by: FAMILY MEDICINE

## 2019-03-25 PROCEDURE — 3074F PR MOST RECENT SYSTOLIC BLOOD PRESSURE < 130 MM HG: ICD-10-PCS | Mod: CPTII,S$GLB,, | Performed by: FAMILY MEDICINE

## 2019-03-25 PROCEDURE — 93005 ELECTROCARDIOGRAM TRACING: CPT | Mod: S$GLB,,, | Performed by: FAMILY MEDICINE

## 2019-03-25 PROCEDURE — 93005 EKG 12-LEAD: ICD-10-PCS | Mod: S$GLB,,, | Performed by: FAMILY MEDICINE

## 2019-03-25 PROCEDURE — 3008F PR BODY MASS INDEX (BMI) DOCUMENTED: ICD-10-PCS | Mod: CPTII,S$GLB,, | Performed by: FAMILY MEDICINE

## 2019-03-25 PROCEDURE — 3079F PR MOST RECENT DIASTOLIC BLOOD PRESSURE 80-89 MM HG: ICD-10-PCS | Mod: CPTII,S$GLB,, | Performed by: FAMILY MEDICINE

## 2019-03-25 PROCEDURE — 3046F HEMOGLOBIN A1C LEVEL >9.0%: CPT | Mod: CPTII,S$GLB,, | Performed by: FAMILY MEDICINE

## 2019-03-25 PROCEDURE — 99499 NO LOS: ICD-10-PCS | Mod: S$GLB,,, | Performed by: FAMILY MEDICINE

## 2019-03-25 PROCEDURE — 3046F PR MOST RECENT HEMOGLOBIN A1C LEVEL > 9.0%: ICD-10-PCS | Mod: CPTII,S$GLB,, | Performed by: FAMILY MEDICINE

## 2019-03-25 RX ORDER — ASPIRIN 81 MG/1
81 TABLET ORAL DAILY
Refills: 0 | COMMUNITY
Start: 2019-03-25 | End: 2019-03-25 | Stop reason: CLARIF

## 2019-03-25 NOTE — PROGRESS NOTES
Follow-up diabetes.  States compliance with medications since last visit.  Glucose readings are improving.  Denies side effects.  He does mention today some intermittent chest tightness.  Symptoms last for less than a minute.  States he has had symptoms for several years, but seems to have increased in frequency 2 or 3 times a week currently.  He did quit smoking recently.  Does not seem to be exertional.  He does have mild COPD/bronchiectasis as well as arthritis in his knees, but believes he would be able to walk on a treadmill for stress testing.          Past Medical History:  Past Medical History:   Diagnosis Date    Adrenal adenoma 2008    bx benign    Arthritis     Bronchiectasis 5/15/2012    Cataract     COPD, mild 10/2/2017    Diabetes mellitus, type 2     Emphysema     Hemoptysis     Hyperlipidemia     Hyperplastic colon polyp     Hypertension     Lung nodules     small stable on serial ct    Lymphocytosis     reactive    Peptic ulcer disease     Pneumonia     Tobacco abuse 5/15/2012     Past Surgical History:   Procedure Laterality Date    APPENDECTOMY      ARTHROSCOPY-KNEE W/ CHONDROPLASTY Right 12/14/2017    [facility]    ARTHROSCOPY-MENISCECTOMY Right 12/14/2017    [facility]    BONE MARROW BIOPSY      BRONCHOSCOPY      COLONOSCOPY  8/6/2013         KNEE ARTHROSCOPY Left     MULTIPLE TOOTH EXTRACTIONS      RELEASE-LATERAL Right 12/14/2017    [facility]     Social History     Socioeconomic History    Marital status:      Spouse name: Not on file    Number of children: Not on file    Years of education: Not on file    Highest education level: Not on file   Occupational History    Not on file   Social Needs    Financial resource strain: Not on file    Food insecurity:     Worry: Not on file     Inability: Not on file    Transportation needs:     Medical: Not on file      Non-medical: Not on file   Tobacco Use    Smoking status: Former Smoker     Packs/day: 0.25     Years: 53.00     Pack years: 13.25     Types: Cigarettes     Last attempt to quit: 2018     Years since quittin.2    Smokeless tobacco: Never Used    Tobacco comment: 1 pack every 3 days   Substance and Sexual Activity    Alcohol use: No     Alcohol/week: 0.6 oz     Types: 1 Cans of beer per week     Comment: 1 beer every 2 weeks    Drug use: No    Sexual activity: Not on file   Lifestyle    Physical activity:     Days per week: Not on file     Minutes per session: Not on file    Stress: Not on file   Relationships    Social connections:     Talks on phone: Not on file     Gets together: Not on file     Attends Mormonism service: Not on file     Active member of club or organization: Not on file     Attends meetings of clubs or organizations: Not on file     Relationship status: Not on file    Intimate partner violence:     Fear of current or ex partner: Not on file     Emotionally abused: Not on file     Physically abused: Not on file     Forced sexual activity: Not on file   Other Topics Concern    Not on file   Social History Narrative    Not on file     Family History   Problem Relation Age of Onset    Kidney failure Father     Diabetes Father     Heart disease Mother 60    Diabetes Brother     Cancer Brother         prostate    Glaucoma Brother         ?    Cancer Brother     Amblyopia Neg Hx     Blindness Neg Hx     Cataracts Neg Hx     Hypertension Neg Hx     Macular degeneration Neg Hx     Retinal detachment Neg Hx     Strabismus Neg Hx     Stroke Neg Hx     Thyroid disease Neg Hx      Review of patient's allergies indicates:   Allergen Reactions    Aspirin      Other reaction(s): Stomach upset  Other reaction(s): Nausea     Current Outpatient Medications on File Prior to Visit   Medication Sig Dispense Refill    ALBUTEROL INHL Inhale into the lungs.      benazepril  (LOTENSIN) 40 MG tablet Take 1 tablet (40 mg total) by mouth once daily. 90 tablet 3    blood sugar diagnostic (BLOOD GLUCOSE TEST) Strp Test glucose 3 x daily 100 strip 11    blood-glucose meter Misc Use as directed 1 each prn    hydroCHLOROthiazide (MICROZIDE) 12.5 mg capsule Take 1 capsule (12.5 mg total) by mouth once daily. 90 capsule 3    lancets Misc As directed 100 each prn    metFORMIN (GLUCOPHAGE) 1000 MG tablet Take 1 tablet (1,000 mg total) by mouth 2 (two) times daily with meals. 180 tablet 1    pravastatin (PRAVACHOL) 20 MG tablet Take 1 tablet (20 mg total) by mouth once daily. 90 tablet 3    [DISCONTINUED] amLODIPine (NORVASC) 5 MG tablet TAKE 1 TABLET BY MOUTH ONCE DAILY 90 tablet 0    [DISCONTINUED] blood sugar diagnostic (BLOOD GLUCOSE TEST) Strp Test glucose 1 x daily 100 strip prn    [DISCONTINUED] lancets Misc As directed 100 each 11     No current facility-administered medications on file prior to visit.            ROS:  GENERAL: No fever, chills,  or significant weight changes.   CARDIOVASCULAR: Denies  PND, orthopnea or reduced exercise tolerance.  ABDOMEN: Appetite fine. Denies diarrhea, abdominal pain, hematemesis or blood in stool.  URINARY: No flank pain, dysuria or hematuria.    Vitals:    03/25/19 0931   BP: 122/81   Pulse: 64   Weight: 81 kg (178 lb 9.6 oz)   Height: 6' (1.829 m)       Wt Readings from Last 3 Encounters:   03/25/19 81 kg (178 lb 9.6 oz)   03/11/19 79.3 kg (174 lb 12.8 oz)   03/08/19 81.3 kg (179 lb 3.7 oz)       APPEARANCE: Well nourished, well developed, in no acute distress.    HEAD: Normocephalic.  Atraumatic.  EYES:   Right eye: Pupil reactive.  Conjunctiva clear.    Left eye: Pupil reactive.  Conjunctiva clear.    NECK: Supple. No bruits.  No JVD.  No cervical lymphadenopathy.  No thyromegaly.    CHEST: Breath sounds clear bilaterally.  Normal respiratory effort  CARDIOVASCULAR: Normal rate.  Regular rhythm.  No murmurs.  No rub.  No gallops.   No  edema.  MENTAL STATUS: Alert.  Oriented x 3.    Misha was seen today for follow-up.    Diagnoses and all orders for this visit:    Type 2 diabetes mellitus without complication, without long-term current use of insulin  -     Ambulatory referral to Optometry    Feeling of chest tightness  -     Exercise stress echo with color flow; Future  -     EKG 12-lead; Future    Bronchiectasis without complication    COPD, mild    Primary osteoarthritis of both knees    Other orders      Continue current medication.  Send glucose readings in 2 weeks.  Cardiac evaluation as above.  Arrange eye exam.  Follow-up laboratory as scheduled in June.

## 2019-03-25 NOTE — TELEPHONE ENCOUNTER
----- Message from Hoa Maldonado sent at 3/25/2019 11:05 AM CDT -----  Contact: spouse   Patient requesting office to fax an excuse to patient's job.Please fax to [phone]    Thanks,  Hoa Maldonado

## 2019-04-02 ENCOUNTER — CLINICAL SUPPORT (OUTPATIENT)
Dept: CARDIOLOGY | Facility: CLINIC | Age: 64
End: 2019-04-02
Attending: FAMILY MEDICINE
Payer: COMMERCIAL

## 2019-04-02 DIAGNOSIS — R07.89 FEELING OF CHEST TIGHTNESS: ICD-10-CM

## 2019-04-02 LAB
DIASTOLIC DYSFUNCTION: NO
ESTIMATED PA SYSTOLIC PRESSURE: 24.9
MITRAL VALVE MOBILITY: NORMAL
RETIRED EF AND QEF - SEE NOTES: 60 (ref 55–65)
TRICUSPID VALVE REGURGITATION: NORMAL

## 2019-04-02 PROCEDURE — 93351 EXERCISE STRESS ECHO WITH COLOR FLOW: ICD-10-PCS | Mod: S$GLB,,, | Performed by: INTERNAL MEDICINE

## 2019-04-02 PROCEDURE — 93325 EXERCISE STRESS ECHO WITH COLOR FLOW: ICD-10-PCS | Mod: S$GLB,,, | Performed by: INTERNAL MEDICINE

## 2019-04-02 PROCEDURE — 93320 EXERCISE STRESS ECHO WITH COLOR FLOW: ICD-10-PCS | Mod: S$GLB,,, | Performed by: INTERNAL MEDICINE

## 2019-04-02 PROCEDURE — 93320 DOPPLER ECHO COMPLETE: CPT | Mod: S$GLB,,, | Performed by: INTERNAL MEDICINE

## 2019-04-02 PROCEDURE — 93325 DOPPLER ECHO COLOR FLOW MAPG: CPT | Mod: S$GLB,,, | Performed by: INTERNAL MEDICINE

## 2019-04-02 PROCEDURE — 93351 STRESS TTE COMPLETE: CPT | Mod: S$GLB,,, | Performed by: INTERNAL MEDICINE

## 2019-04-03 ENCOUNTER — TELEPHONE (OUTPATIENT)
Dept: FAMILY MEDICINE | Facility: CLINIC | Age: 64
End: 2019-04-03

## 2019-04-03 NOTE — TELEPHONE ENCOUNTER
----- Message from Eloina Boyd sent at 4/3/2019 11:43 AM CDT -----  Contact: Sara/wife  Patient needs to get a excuse from yesterday he came in to do his stress test, Please  fax it to his job at [phone](attn: [employer] [facility]) and or please call her at [phone].    Thanks  Td

## 2019-06-11 ENCOUNTER — LAB VISIT (OUTPATIENT)
Dept: LAB | Facility: HOSPITAL | Age: 64
End: 2019-06-11
Attending: FAMILY MEDICINE
Payer: COMMERCIAL

## 2019-06-11 DIAGNOSIS — E11.9 TYPE 2 DIABETES MELLITUS WITHOUT COMPLICATION, WITHOUT LONG-TERM CURRENT USE OF INSULIN: ICD-10-CM

## 2019-06-11 LAB
ESTIMATED AVG GLUCOSE: 183 MG/DL (ref 68–131)
HBA1C MFR BLD HPLC: 8 % (ref 4–5.6)

## 2019-06-11 PROCEDURE — 83036 HEMOGLOBIN GLYCOSYLATED A1C: CPT

## 2019-06-11 PROCEDURE — 36415 COLL VENOUS BLD VENIPUNCTURE: CPT | Mod: PO

## 2019-06-17 ENCOUNTER — OFFICE VISIT (OUTPATIENT)
Dept: FAMILY MEDICINE | Facility: CLINIC | Age: 64
End: 2019-06-17
Payer: COMMERCIAL

## 2019-06-17 VITALS
BODY MASS INDEX: 23.59 KG/M2 | SYSTOLIC BLOOD PRESSURE: 119 MMHG | HEIGHT: 72 IN | WEIGHT: 174.19 LBS | DIASTOLIC BLOOD PRESSURE: 84 MMHG | HEART RATE: 59 BPM

## 2019-06-17 DIAGNOSIS — E11.59 HYPERTENSION ASSOCIATED WITH DIABETES: ICD-10-CM

## 2019-06-17 DIAGNOSIS — E11.9 TYPE 2 DIABETES MELLITUS WITHOUT COMPLICATION, WITHOUT LONG-TERM CURRENT USE OF INSULIN: Primary | ICD-10-CM

## 2019-06-17 DIAGNOSIS — Z12.5 SCREENING FOR PROSTATE CANCER: ICD-10-CM

## 2019-06-17 DIAGNOSIS — I15.2 HYPERTENSION ASSOCIATED WITH DIABETES: ICD-10-CM

## 2019-06-17 PROCEDURE — 3079F PR MOST RECENT DIASTOLIC BLOOD PRESSURE 80-89 MM HG: ICD-10-PCS | Mod: CPTII,S$GLB,, | Performed by: FAMILY MEDICINE

## 2019-06-17 PROCEDURE — 3074F SYST BP LT 130 MM HG: CPT | Mod: CPTII,S$GLB,, | Performed by: FAMILY MEDICINE

## 2019-06-17 PROCEDURE — 3074F PR MOST RECENT SYSTOLIC BLOOD PRESSURE < 130 MM HG: ICD-10-PCS | Mod: CPTII,S$GLB,, | Performed by: FAMILY MEDICINE

## 2019-06-17 PROCEDURE — 3079F DIAST BP 80-89 MM HG: CPT | Mod: CPTII,S$GLB,, | Performed by: FAMILY MEDICINE

## 2019-06-17 PROCEDURE — 99999 PR PBB SHADOW E&M-EST. PATIENT-LVL III: ICD-10-PCS | Mod: PBBFAC,,, | Performed by: FAMILY MEDICINE

## 2019-06-17 PROCEDURE — 99999 PR PBB SHADOW E&M-EST. PATIENT-LVL III: CPT | Mod: PBBFAC,,, | Performed by: FAMILY MEDICINE

## 2019-06-17 PROCEDURE — 3008F BODY MASS INDEX DOCD: CPT | Mod: CPTII,S$GLB,, | Performed by: FAMILY MEDICINE

## 2019-06-17 PROCEDURE — 99213 PR OFFICE/OUTPT VISIT, EST, LEVL III, 20-29 MIN: ICD-10-PCS | Mod: S$GLB,,, | Performed by: FAMILY MEDICINE

## 2019-06-17 PROCEDURE — 3045F PR MOST RECENT HEMOGLOBIN A1C LEVEL 7.0-9.0%: CPT | Mod: CPTII,S$GLB,, | Performed by: FAMILY MEDICINE

## 2019-06-17 PROCEDURE — 3045F PR MOST RECENT HEMOGLOBIN A1C LEVEL 7.0-9.0%: ICD-10-PCS | Mod: CPTII,S$GLB,, | Performed by: FAMILY MEDICINE

## 2019-06-17 PROCEDURE — 3008F PR BODY MASS INDEX (BMI) DOCUMENTED: ICD-10-PCS | Mod: CPTII,S$GLB,, | Performed by: FAMILY MEDICINE

## 2019-06-17 PROCEDURE — 99213 OFFICE O/P EST LOW 20 MIN: CPT | Mod: S$GLB,,, | Performed by: FAMILY MEDICINE

## 2019-06-17 RX ORDER — METFORMIN HYDROCHLORIDE 500 MG/1
2000 TABLET, EXTENDED RELEASE ORAL
Qty: 360 TABLET | Refills: 1 | Status: SHIPPED | OUTPATIENT
Start: 2019-06-17 | End: 2021-11-10 | Stop reason: SDDI

## 2019-06-18 NOTE — PROGRESS NOTES
Follow-up diabetes.  Hemoglobin A1c still elevated, but improving.  He does forget to take the evening dose of metformin a few days per week.  States compliance otherwise.  Blood pressure is controlled.  Home glucose readings brought in.  Most recent readings with fastings around 130.  Other readings mostly below 150.          Misha was seen today for follow-up.    Diagnoses and all orders for this visit:    Type 2 diabetes mellitus without complication, without long-term current use of insulin    Hypertension associated with diabetes    Screening for prostate cancer  -     PSA, Screening; Future    Other orders  -     metFORMIN (GLUCOPHAGE-XR) 500 MG 24 hr tablet; Take 4 tablets (2,000 mg total) by mouth daily with breakfast.  -     blood sugar diagnostic (BLOOD GLUCOSE TEST) Strp; Test glucose 1 x daily (use insurance preferred brand)      Change metformin to take all the medication in the morning.  Monitor glucose readings.  Arrange appointment with Optometry  Recheck hemoglobin A1c, PSA 3 months        Diabetes Management Status    Statin: Taking  ACE/ARB: Taking    Screening or Prevention Patient's value Goal Complete/Controlled?   HgA1C Testing and Control   Lab Results   Component Value Date    HGBA1C 8.0 (H) 06/11/2019      Annually/Less than 8% No   Lipid profile : 03/04/2019 Annually Yes   LDL control Lab Results   Component Value Date    LDLCALC 86.6 03/04/2019    Annually/Less than 100 mg/dl  Yes   Nephropathy screening Lab Results   Component Value Date    LABMICR 6.0 03/04/2019     No results found for: PROTEINUA Annually Yes   Blood pressure BP Readings from Last 1 Encounters:   06/17/19 119/84    Less than 140/90 Yes   Dilated retinal exam : 12/12/2017 Annually Yes   Foot exam   : 03/08/2019 Annually Yes       Past Medical History:  Past Medical History:   Diagnosis Date    Adrenal adenoma 2008    bx benign    Arthritis     Bronchiectasis 5/15/2012    Cataract     COPD, mild 10/2/2017     Diabetes mellitus, type 2     Emphysema     Hemoptysis     Hyperlipidemia     Hyperplastic colon polyp     Hypertension     Lung nodules     small stable on serial ct    Lymphocytosis     reactive    Peptic ulcer disease     Pneumonia     Tobacco abuse 5/15/2012     Past Surgical History:   Procedure Laterality Date    APPENDECTOMY      ARTHROSCOPY-KNEE W/ CHONDROPLASTY Right 12/14/2017    [facility]    ARTHROSCOPY-MENISCECTOMY Right 12/14/2017    [facility]    BONE MARROW BIOPSY      BRONCHOSCOPY      COLONOSCOPY  8/6/2013         KNEE ARTHROSCOPY Left     MULTIPLE TOOTH EXTRACTIONS      RELEASE-LATERAL Right 12/14/2017    [facility] OR     Review of patient's allergies indicates:   Allergen Reactions    Aspirin      Other reaction(s): Stomach upset  Other reaction(s): Nausea     Current Outpatient Medications on File Prior to Visit   Medication Sig Dispense Refill    ALBUTEROL INHL Inhale into the lungs as needed.       benazepril (LOTENSIN) 40 MG tablet Take 1 tablet (40 mg total) by mouth once daily. 90 tablet 3    blood-glucose meter Misc Use as directed 1 each prn    hydroCHLOROthiazide (MICROZIDE) 12.5 mg capsule Take 1 capsule (12.5 mg total) by mouth once daily. 90 capsule 3    lancets Misc As directed 100 each prn    [DISCONTINUED] blood sugar diagnostic (BLOOD GLUCOSE TEST) Strp Test glucose 3 x daily 100 strip 11    [DISCONTINUED] metFORMIN (GLUCOPHAGE) 1000 MG tablet Take 1 tablet (1,000 mg total) by mouth 2 (two) times daily with meals. 180 tablet 1    pravastatin (PRAVACHOL) 20 MG tablet Take 1 tablet (20 mg total) by mouth once daily. 90 tablet 3     No current facility-administered medications on file prior to visit.      Social History     Socioeconomic History    Marital status:      Spouse name: Not on file    Number of children: Not on file    Years of  education: Not on file    Highest education level: Not on file   Occupational History    Not on file   Social Needs    Financial resource strain: Not on file    Food insecurity:     Worry: Not on file     Inability: Not on file    Transportation needs:     Medical: Not on file     Non-medical: Not on file   Tobacco Use    Smoking status: Former Smoker     Packs/day: 0.25     Years: 53.00     Pack years: 13.25     Types: Cigarettes     Last attempt to quit: 2018     Years since quittin.5    Smokeless tobacco: Never Used    Tobacco comment: 1 pack every 3 days   Substance and Sexual Activity    Alcohol use: No     Alcohol/week: 0.6 oz     Types: 1 Cans of beer per week     Comment: 1 beer every 2 weeks    Drug use: No    Sexual activity: Not on file   Lifestyle    Physical activity:     Days per week: Not on file     Minutes per session: Not on file    Stress: Not on file   Relationships    Social connections:     Talks on phone: Not on file     Gets together: Not on file     Attends Sikh service: Not on file     Active member of club or organization: Not on file     Attends meetings of clubs or organizations: Not on file     Relationship status: Not on file   Other Topics Concern    Not on file   Social History Narrative    Not on file     Family History   Problem Relation Age of Onset    Kidney failure Father     Diabetes Father     Heart disease Mother 60    Diabetes Brother     Cancer Brother         prostate    Glaucoma Brother         ?    Cancer Brother     Amblyopia Neg Hx     Blindness Neg Hx     Cataracts Neg Hx     Hypertension Neg Hx     Macular degeneration Neg Hx     Retinal detachment Neg Hx     Strabismus Neg Hx     Stroke Neg Hx     Thyroid disease Neg Hx            ROS:  GENERAL: No fever, chills,  or significant weight changes.   CARDIOVASCULAR: Denies chest pain, PND, orthopnea or reduced exercise tolerance.  ABDOMEN: Appetite fine. Denies diarrhea,  abdominal pain, hematemesis or blood in stool.  URINARY: No flank pain, dysuria or hematuria.    Vitals:    06/17/19 1559   BP: 119/84   Pulse: (!) 59   Weight: 79 kg (174 lb 3.2 oz)   Height: 6' (1.829 m)       Wt Readings from Last 3 Encounters:   06/17/19 79 kg (174 lb 3.2 oz)   03/25/19 81 kg (178 lb 9.6 oz)   03/11/19 79.3 kg (174 lb 12.8 oz)       APPEARANCE: Well nourished, well developed, in no acute distress.    HEAD: Normocephalic.  Atraumatic.  EYES:   Right eye: Pupil reactive.  Conjunctiva clear.    Left eye: Pupil reactive.  Conjunctiva clear.    NECK: Supple. No bruits.  No JVD.  No cervical lymphadenopathy.  No thyromegaly.    CHEST: Breath sounds clear bilaterally.  Normal respiratory effort  CARDIOVASCULAR: Normal rate.  Regular rhythm.  No murmurs.  No rub.  No gallops.   No edema.  MENTAL STATUS: Alert.  Oriented x 3.

## 2019-08-08 ENCOUNTER — OFFICE VISIT (OUTPATIENT)
Dept: OPTOMETRY | Facility: CLINIC | Age: 64
End: 2019-08-08
Payer: COMMERCIAL

## 2019-08-08 DIAGNOSIS — H43.812 POSTERIOR VITREOUS DETACHMENT, LEFT: ICD-10-CM

## 2019-08-08 DIAGNOSIS — H52.203 MYOPIA WITH ASTIGMATISM AND PRESBYOPIA, BILATERAL: ICD-10-CM

## 2019-08-08 DIAGNOSIS — H52.4 MYOPIA WITH ASTIGMATISM AND PRESBYOPIA, BILATERAL: ICD-10-CM

## 2019-08-08 DIAGNOSIS — E11.9 DIABETES MELLITUS TYPE 2 WITHOUT RETINOPATHY: Primary | ICD-10-CM

## 2019-08-08 DIAGNOSIS — H25.13 NUCLEAR SCLEROSIS, BILATERAL: ICD-10-CM

## 2019-08-08 DIAGNOSIS — H52.13 MYOPIA WITH ASTIGMATISM AND PRESBYOPIA, BILATERAL: ICD-10-CM

## 2019-08-08 DIAGNOSIS — H40.023 OAG (OPEN ANGLE GLAUCOMA) SUSPECT, HIGH RISK, BILATERAL: ICD-10-CM

## 2019-08-08 LAB
LEFT EYE DM RETINOPATHY: NEGATIVE
RIGHT EYE DM RETINOPATHY: NEGATIVE

## 2019-08-08 PROCEDURE — 92014 COMPRE OPH EXAM EST PT 1/>: CPT | Mod: S$GLB,,, | Performed by: OPTOMETRIST

## 2019-08-08 PROCEDURE — 99999 PR PBB SHADOW E&M-EST. PATIENT-LVL III: ICD-10-PCS | Mod: PBBFAC,,, | Performed by: OPTOMETRIST

## 2019-08-08 PROCEDURE — 99999 PR PBB SHADOW E&M-EST. PATIENT-LVL III: CPT | Mod: PBBFAC,,, | Performed by: OPTOMETRIST

## 2019-08-08 PROCEDURE — 92133 POSTERIOR SEGMENT OCT OPTIC NERVE(OCULAR COHERENCE TOMOGRAPHY) - OU - BOTH EYES: ICD-10-PCS | Mod: S$GLB,,, | Performed by: OPTOMETRIST

## 2019-08-08 PROCEDURE — 92133 CPTRZD OPH DX IMG PST SGM ON: CPT | Mod: S$GLB,,, | Performed by: OPTOMETRIST

## 2019-08-08 PROCEDURE — 92015 DETERMINE REFRACTIVE STATE: CPT | Mod: S$GLB,,, | Performed by: OPTOMETRIST

## 2019-08-08 PROCEDURE — 92015 PR REFRACTION: ICD-10-PCS | Mod: S$GLB,,, | Performed by: OPTOMETRIST

## 2019-08-08 PROCEDURE — 92014 PR EYE EXAM, EST PATIENT,COMPREHESV: ICD-10-PCS | Mod: S$GLB,,, | Performed by: OPTOMETRIST

## 2019-08-08 NOTE — PROGRESS NOTES
HPI     Routine diabetic /glaucoma exam--dle-12/12/17    Pt denies any blurred vision. Needing updated glasses rx. Pt complains of   FBS OS since this morning while working. Pt states he had glass removed   out of OD x 1 month ago in [place]. Slight pain OS. Denies any flashes or   floaters. BSL controled.    Hemoglobin A1C       Date                     Value               Ref Range             Status                06/11/2019               8.0 (H)             4.0 - 5.6 %           Final              Comment:    ADA Screening Guidelines:  5.7-6.4%  Consistent with   prediabetes  >or=6.5%  Consistent with diabetes  High levels of fetal   hemoglobin interfere with the HbA1C  assay. Heterozygous hemoglobin   variants (HbS, HgC, etc)do  not significantly interfere with this assay.     However, presence of multiple variants may affect accuracy.         03/04/2019               10.2 (H)            4.0 - 5.6 %           Final              Comment:    ADA Screening Guidelines:  5.7-6.4%  Consistent with   prediabetes  >or=6.5%  Consistent with diabetes  High levels of fetal   hemoglobin interfere with the HbA1C  assay. Heterozygous hemoglobin   variants (HbS, HgC, etc)do  not significantly interfere with this assay.     However, presence of multiple variants may affect accuracy.         09/10/2018               6.8 (H)             4.0 - 5.6 %           Final              Comment:    ADA Screening Guidelines:  5.7-6.4%  Consistent with   prediabetes  >or=6.5%  Consistent with diabetes  High levels of fetal   hemoglobin interfere with the HbA1C  assay. Heterozygous hemoglobin   variants (HbS, HgC, etc)do  not significantly interfere with this assay.     However, presence of multiple variants may affect accuracy.    ----------      Last edited by Lisa Vasquez on 8/8/2019  1:33 PM. (History)        ROS     Positive for: Eyes    Negative for: Constitutional, Gastrointestinal, Neurological, Skin,   Genitourinary,  Musculoskeletal, HENT, Endocrine, Cardiovascular,   Respiratory, Psychiatric, Allergic/Imm, Heme/Lymph    Last edited by ZONIA Kent, OD on 8/8/2019  2:02 PM. (History)        Assessment /Plan     For exam results, see Encounter Report.    Diabetes mellitus type 2 without retinopathy    Posterior vitreous detachment, left    Nuclear sclerosis, bilateral    OAG (open angle glaucoma) suspect, high risk, bilateral  -     Posterior Segment OCT Optic Nerve- Both eyes    Myopia with astigmatism and presbyopia, bilateral      1. No ret/ no csme, gave info, control glucose, annual DFE  2. RD precautions given  3. Early vis changes, not ready for consult, cautions night driving  4. Large c/d supsect, angles open  Thin /493  IOP below teens approach single digits  ? fhx glaucoma brother    Updated OCT today   Good rnfl OU  Will repeat fields at next exam  Continue no tx for now    5. Updated specs rx, gave copy, fill prn    Discussed and educated patient on current findings /plan.  RTC 1 year, prn if any changes / issues

## 2019-08-08 NOTE — LETTER
August 8, 2019      [place] [facility]  [address]  [place] [address]  Phone: [phone]  Fax: [phone]       Patient: Misha Simpson   YOB: 1955  Date of Visit: 08/08/2019    To Whom It May Concern:    Lizabeth Simpson  was at [facility] on 08/08/2019. He may return to work on 8/9/19 with no restrictions. If you have any questions or concerns, or if I can be of further assistance, please do not hesitate to contact me.    Sincerely,    Lisa Vasquez

## 2019-08-08 NOTE — PATIENT INSTRUCTIONS
DIABETES AND THE EYE / DIABETIC RETINOPATHY    Diabetic retinopathy is a condition occurring in persons with diabetes, which causes progressive damage to the retina, the light sensitive lining at the back of the eye. It is a serious sight-threatening complication of diabetes.    Diabetic retinopathy is the result of damage to the tiny blood vessels that nourish the retina. They leak blood and other fluids that cause swelling of retinal tissue and clouding of vision. The condition usually affects both eyes. The longer a person has diabetes, the more likely they will develop diabetic retinopathy. If left untreated, diabetic retinopathy can cause blindness.  There are two basic types of diabetic retinopathy:    Background or nonproliferative diabetic retinopathy (NPDR)  Nonproliferative diabetic retinopathy (NPDR) is the earliest stage of diabetic retinopathy. With this condition, damaged blood vessels in the retina begin to leak extra fluid and small amounts of blood into the eye. Sometimes, deposits of cholesterol or other fats from the blood may leak into the retina. Many people with diabetes have mild NPDR, which usually does not affect their vision. However, if their vision is affected, it is the result of macular edema and macular ischemia.    If vision is affected due to macular changes, a consult with a Retina Specialist may be advised.  This is an ophthalmologist that treats retina conditions, including diabetic retinopathy.     Proliferative diabetic retinopathy (PDR)  Proliferative diabetic retinopathy (PDR) mainly occurs when many of the blood vessels in the retina close, preventing enough blood flow. In an attempt to supply blood to the area where the original vessels closed, the retina responds by growing new blood vessels. This is called neovascularization. However, these new blood vessels are abnormal and do not supply the retina with proper blood flow. The new vessels are also often accompanied by scar  "tissue that may cause the retina to wrinkle or detach. PDR may cause more severe vision loss than NPDR because it can affect both central and peripheral vision.     A patient diagnosed with proliferative diabetic eye disease will be referred to a retinal specialist for consultation.    Often there are no visual symptoms in the early stages of diabetic retinopathy. That is why our eye care professionals recommend that everyone with diabetes have a comprehensive dilated eye examination once a year. Early detection and treatment can limit the potential for significant vision loss from diabetic retinopathy.        Early Cataracts--not visually significant for surgery consultation.    What Are Cataracts?  A clear lens in the eye focuses light. This lets the eye see images sharply. With age, the lens slowly becomes cloudy. The cloudy lens is a cataract. A cataract scatters light and makes it hard for the eye to focus. Cataracts often form in both eyes. But one lens may cloud faster than the other.      The Aging of Your Lens    Your lens may cloud so slowly that you don`t notice any vision changes at first. But as the cataract gets worse, the eye has a harder time focusing. In early stages, glasses may help you see better. As the lens gets cloudier, your doctor may recommend surgery to restore your vision.  FLASHES / FLOATERS / POSTERIOR VITREOUS DETACHMENT    Call the clinic if you have any further changes in symptoms.  Including:  Increased numbers of floaters or flashing lights, dimness or darkness that moves through or stays constant in your vision, or any pain in the eye (s).            DRY EYES:  Use Over The Counter artificial tears as needed for dry eye symptoms.  Some common brands include:  Systane, Optive, and Refresh.  These drops can be used as frequently as desired, but may be most helpful use during long periods of concentrated work.  For example, reading / working at the computer.  Avoid drops that "get " "redness out", as these contain medication that may further irritate the eyes.    ALLERGY EYES / SYMPTOMS:    Over the counter medications include--Zaditor and Alaway  Use as directed 1-2 drops daily for symptoms of itching / watering eyes.  These drops will not help for dry eye or exposure symptoms.    GLAUCOMA SUSPECT    Glaucoma is a condition in which damage occurs to the optic nerve inside the eye.  The optic nerve is the wire that transmits vision signals to the brain.   It is typically, but not always, associated with a painless increase in eye pressure over time. In some cases if untreated, Glaucoma can cause blindness.    A Glaucoma Suspect could be defined as a patient that has one or more of the following signs:    Elevated eye pressure.  Enlarged optic nerve head cupping.  Narrow anterior chamber angle.      Tests that may be performed to rule out glaucoma as a diagnosis include:    Visual fields- This test measures the health of the optic nerve and the extent of the peripheral vision as compared to normal.  Visual field loss can be consistent with advancing glaucoma.  HRT / OCT imaging- These computerized tests can obtain 3D scanning images of the optic nerve  / optic nerve cupping to compare past and future results.    Corneal thickness / pachymetry- This test measures the thickness of the clear outer surface of the eye, the cornea.  This physical thickness can have a bearing on the measurement of the eye pressure.  Gonioscopy- This measures or grades the anterior drainage angle.  This structure depth is the distance between the cornea (the clear surface layer) and iris (the colored portion) inside the eye.  If this angle is very slim, or narrow, it can impede normal fluid outflow from the eye, and sometimes cause the eye pressure to rise, and damage the optic nerve.  Optic nerve photography- baseline pictures of the optic nerve may be taken for future comparison.      If a diagnosis of Glaucoma is " made based on test results, you and your doctor will discuss treatment options. Treatment may include:    Rx Eye Drops- Many Glaucoma patients have their disease controlled with 1or 2 topical (eye drop) medications.    Laser Treatment of the Iris or Anterior Drainage Angle-  Out patient / in office laser treatments may be used as an alternative / additional therapy to prescription eye drops.  Advanced eye surgeries-  A small percentage of difficult cases may need more involved surgery with a Glaucoma specialist.  This is an eye surgeon that specializes in the treatment of Glaucoma.

## 2019-08-08 NOTE — LETTER
August 8, 2019      Trino Snyder MD  [address]  [place] [address]           [place] - [facility]ometry  [address] [facility] [address] [place] [address]  Phone: [phone]  Fax: [phone]          Patient: Misha Simpson   MR Number: [id number]   YOB: 1955   Date of Visit: 8/8/2019       Dear Dr. Trino Snyder:    Thank you for referring Misha Simpson to me for evaluation. Attached you will find relevant portions of my assessment and plan of care.    If you have questions, please do not hesitate to call me. I look forward to following Misha Simpson along with you.    Sincerely,    ZONIA Kent, OD    Enclosure  CC:  No Recipients    If you would like to receive this communication electronically, please contact [email or web address] or [phone] to request more information on [employer] access.    For providers and/or their staff who would like to refer a patient to [facility], please contact us through our one-stop-shop provider referral line, Abdirizak Hartman, at [phone].    If you feel you have received this communication in error or would no longer like to receive these types of communications, please e-mail [email or web address]

## 2019-09-12 ENCOUNTER — TELEPHONE (OUTPATIENT)
Dept: FAMILY MEDICINE | Facility: CLINIC | Age: 64
End: 2019-09-12

## 2019-09-12 NOTE — TELEPHONE ENCOUNTER
----- Message from Dale Duffy sent at 9/12/2019  9:21 AM CDT -----  Contact: Pt's wife Sara  Type: Needs Medical Advice    Who Called:  Sara    Best Call Back Number: [phone]  Additional Information: Pt had an outpatient surgery yesterday 9/11/19 for a foreign object in his leg. Pt is in some pain and would like something called into the pharmacy.     [facility] - [facility]raz[facility] - [address] [id number] [facility] [place] [address]  Phone: [phone] Fax: [phone]

## 2019-09-12 NOTE — TELEPHONE ENCOUNTER
Patient informed to try OTC ar recommended or if severe, contact surgeon or got to ER or urgent care, verbalized understanding.

## 2019-09-12 NOTE — TELEPHONE ENCOUNTER
Patient informed Dr Snyder is out until next week.  Advised to contact the doctor that did the procedure and he was told to get from PCP, per patient.  Please advise

## 2019-09-12 NOTE — TELEPHONE ENCOUNTER
Reviewed records.  Has the patient tried over-the-counter anti-inflammatories such as ibuprofen 800 mg every 6 hr as needed for pain and Tylenol 1000 mg every 4 to 6 hr?

## 2019-09-17 ENCOUNTER — LAB VISIT (OUTPATIENT)
Dept: LAB | Facility: HOSPITAL | Age: 64
End: 2019-09-17
Attending: FAMILY MEDICINE
Payer: COMMERCIAL

## 2019-09-17 DIAGNOSIS — Z12.5 SCREENING FOR PROSTATE CANCER: ICD-10-CM

## 2019-09-17 DIAGNOSIS — E11.9 TYPE 2 DIABETES MELLITUS WITHOUT COMPLICATION, WITHOUT LONG-TERM CURRENT USE OF INSULIN: ICD-10-CM

## 2019-09-17 LAB
COMPLEXED PSA SERPL-MCNC: 1.1 NG/ML (ref 0–4)
ESTIMATED AVG GLUCOSE: 177 MG/DL (ref 68–131)
HBA1C MFR BLD HPLC: 7.8 % (ref 4–5.6)

## 2019-09-17 PROCEDURE — 84153 ASSAY OF PSA TOTAL: CPT

## 2019-09-17 PROCEDURE — 36415 COLL VENOUS BLD VENIPUNCTURE: CPT | Mod: PO

## 2019-09-17 PROCEDURE — 83036 HEMOGLOBIN GLYCOSYLATED A1C: CPT

## 2019-09-24 RX ORDER — PIOGLITAZONEHYDROCHLORIDE 15 MG/1
15 TABLET ORAL DAILY
Qty: 90 TABLET | Refills: 3 | Status: SHIPPED | OUTPATIENT
Start: 2019-09-24 | End: 2020-02-04 | Stop reason: DRUGHIGH

## 2019-09-24 NOTE — TELEPHONE ENCOUNTER
Message   Received: Yesterday   Message Contents   [facility] Staff             Sugar is slightly improved from last check.  Recommend add Actos 15 mg daily, not expensive medication..  Continue other medication including metformin as currently.   Schedule lipid panel, CMP, hemoglobin A1c,  urine microalbumin beginning of January.  My nurse will contact you to arrange.   Thanks,   Dr. Snyder

## 2020-01-17 ENCOUNTER — LAB VISIT (OUTPATIENT)
Dept: LAB | Facility: HOSPITAL | Age: 65
End: 2020-01-17
Attending: FAMILY MEDICINE
Payer: COMMERCIAL

## 2020-01-17 DIAGNOSIS — E11.9 TYPE 2 DIABETES MELLITUS WITHOUT COMPLICATION, WITHOUT LONG-TERM CURRENT USE OF INSULIN: ICD-10-CM

## 2020-01-17 LAB
ALBUMIN SERPL BCP-MCNC: 3.9 G/DL (ref 3.5–5.2)
ALP SERPL-CCNC: 82 U/L (ref 55–135)
ALT SERPL W/O P-5'-P-CCNC: 17 U/L (ref 10–44)
ANION GAP SERPL CALC-SCNC: 6 MMOL/L (ref 8–16)
AST SERPL-CCNC: 34 U/L (ref 10–40)
BILIRUB SERPL-MCNC: 0.7 MG/DL (ref 0.1–1)
BUN SERPL-MCNC: 11 MG/DL (ref 8–23)
CALCIUM SERPL-MCNC: 9.2 MG/DL (ref 8.7–10.5)
CHLORIDE SERPL-SCNC: 108 MMOL/L (ref 95–110)
CHOLEST SERPL-MCNC: 155 MG/DL (ref 120–199)
CHOLEST/HDLC SERPL: 3 {RATIO} (ref 2–5)
CO2 SERPL-SCNC: 26 MMOL/L (ref 23–29)
CREAT SERPL-MCNC: 1.1 MG/DL (ref 0.5–1.4)
EST. GFR  (AFRICAN AMERICAN): >60 ML/MIN/1.73 M^2
EST. GFR  (NON AFRICAN AMERICAN): >60 ML/MIN/1.73 M^2
ESTIMATED AVG GLUCOSE: 169 MG/DL (ref 68–131)
GLUCOSE SERPL-MCNC: 145 MG/DL (ref 70–110)
HBA1C MFR BLD HPLC: 7.5 % (ref 4–5.6)
HDLC SERPL-MCNC: 51 MG/DL (ref 40–75)
HDLC SERPL: 32.9 % (ref 20–50)
LDLC SERPL CALC-MCNC: 88 MG/DL (ref 63–159)
NONHDLC SERPL-MCNC: 104 MG/DL
POTASSIUM SERPL-SCNC: 4.1 MMOL/L (ref 3.5–5.1)
PROT SERPL-MCNC: 7.6 G/DL (ref 6–8.4)
SODIUM SERPL-SCNC: 140 MMOL/L (ref 136–145)
TRIGL SERPL-MCNC: 80 MG/DL (ref 30–150)

## 2020-01-17 PROCEDURE — 36415 COLL VENOUS BLD VENIPUNCTURE: CPT | Mod: PO

## 2020-01-17 PROCEDURE — 80061 LIPID PANEL: CPT

## 2020-01-17 PROCEDURE — 80053 COMPREHEN METABOLIC PANEL: CPT

## 2020-01-17 PROCEDURE — 83036 HEMOGLOBIN GLYCOSYLATED A1C: CPT

## 2020-02-04 RX ORDER — PIOGLITAZONEHYDROCHLORIDE 30 MG/1
30 TABLET ORAL DAILY
Qty: 90 TABLET | Refills: 3 | Status: SHIPPED | OUTPATIENT
Start: 2020-02-04 | End: 2020-09-12 | Stop reason: SDUPTHER

## 2020-02-04 NOTE — TELEPHONE ENCOUNTER
[facility] Staff             Sugar improving.  Cholesterol okay.  Recommend increase Actos 30 mg daily.  Continue other medication as currently.  Recheck hemoglobin A1c, urine microalbumin 3 months.

## 2020-03-14 DIAGNOSIS — I15.2 HYPERTENSION ASSOCIATED WITH DIABETES: ICD-10-CM

## 2020-03-14 DIAGNOSIS — E11.59 HYPERTENSION ASSOCIATED WITH DIABETES: ICD-10-CM

## 2020-03-15 RX ORDER — HYDROCHLOROTHIAZIDE 12.5 MG/1
CAPSULE ORAL
Qty: 90 CAPSULE | Refills: 0 | Status: SHIPPED | OUTPATIENT
Start: 2020-03-15 | End: 2020-06-15

## 2020-03-15 RX ORDER — BENAZEPRIL HYDROCHLORIDE 40 MG/1
TABLET ORAL
Qty: 90 TABLET | Refills: 0 | Status: SHIPPED | OUTPATIENT
Start: 2020-03-15 | End: 2020-06-15

## 2020-05-04 ENCOUNTER — LAB VISIT (OUTPATIENT)
Dept: LAB | Facility: HOSPITAL | Age: 65
End: 2020-05-04
Attending: FAMILY MEDICINE
Payer: COMMERCIAL

## 2020-05-04 DIAGNOSIS — E11.9 TYPE 2 DIABETES MELLITUS WITHOUT COMPLICATION, WITHOUT LONG-TERM CURRENT USE OF INSULIN: ICD-10-CM

## 2020-05-04 LAB
ESTIMATED AVG GLUCOSE: 163 MG/DL (ref 68–131)
HBA1C MFR BLD HPLC: 7.3 % (ref 4–5.6)

## 2020-05-04 PROCEDURE — 36415 COLL VENOUS BLD VENIPUNCTURE: CPT | Mod: PO

## 2020-05-04 PROCEDURE — 83036 HEMOGLOBIN GLYCOSYLATED A1C: CPT

## 2020-06-15 DIAGNOSIS — E11.59 HYPERTENSION ASSOCIATED WITH DIABETES: ICD-10-CM

## 2020-06-15 DIAGNOSIS — I15.2 HYPERTENSION ASSOCIATED WITH DIABETES: ICD-10-CM

## 2020-06-15 RX ORDER — HYDROCHLOROTHIAZIDE 12.5 MG/1
CAPSULE ORAL
Qty: 90 CAPSULE | Refills: 0 | Status: SHIPPED | OUTPATIENT
Start: 2020-06-15 | End: 2020-07-16

## 2020-06-15 RX ORDER — BENAZEPRIL HYDROCHLORIDE 40 MG/1
TABLET ORAL
Qty: 90 TABLET | Refills: 0 | Status: SHIPPED | OUTPATIENT
Start: 2020-06-15 | End: 2020-09-12

## 2020-06-22 ENCOUNTER — OFFICE VISIT (OUTPATIENT)
Dept: FAMILY MEDICINE | Facility: CLINIC | Age: 65
End: 2020-06-22
Payer: COMMERCIAL

## 2020-06-22 VITALS
TEMPERATURE: 98 F | SYSTOLIC BLOOD PRESSURE: 143 MMHG | DIASTOLIC BLOOD PRESSURE: 74 MMHG | HEIGHT: 72 IN | HEART RATE: 59 BPM | BODY MASS INDEX: 24.46 KG/M2 | WEIGHT: 180.63 LBS

## 2020-06-22 DIAGNOSIS — Z11.4 SCREENING FOR HIV (HUMAN IMMUNODEFICIENCY VIRUS): ICD-10-CM

## 2020-06-22 DIAGNOSIS — J47.9 BRONCHIECTASIS WITHOUT COMPLICATION: ICD-10-CM

## 2020-06-22 DIAGNOSIS — J43.9 PULMONARY EMPHYSEMA, UNSPECIFIED EMPHYSEMA TYPE: ICD-10-CM

## 2020-06-22 DIAGNOSIS — E11.9 TYPE 2 DIABETES MELLITUS WITHOUT COMPLICATION, WITHOUT LONG-TERM CURRENT USE OF INSULIN: ICD-10-CM

## 2020-06-22 DIAGNOSIS — Z00.00 ROUTINE HISTORY AND PHYSICAL EXAMINATION OF ADULT: Primary | ICD-10-CM

## 2020-06-22 DIAGNOSIS — I15.2 HYPERTENSION ASSOCIATED WITH DIABETES: ICD-10-CM

## 2020-06-22 DIAGNOSIS — E11.69 COMBINED HYPERLIPIDEMIA ASSOCIATED WITH TYPE 2 DIABETES MELLITUS: ICD-10-CM

## 2020-06-22 DIAGNOSIS — E11.59 HYPERTENSION ASSOCIATED WITH DIABETES: ICD-10-CM

## 2020-06-22 DIAGNOSIS — E78.2 COMBINED HYPERLIPIDEMIA ASSOCIATED WITH TYPE 2 DIABETES MELLITUS: ICD-10-CM

## 2020-06-22 PROCEDURE — 90472 IMMUNIZATION ADMIN EACH ADD: CPT | Mod: S$GLB,,, | Performed by: FAMILY MEDICINE

## 2020-06-22 PROCEDURE — 99999 PR PBB SHADOW E&M-EST. PATIENT-LVL IV: CPT | Mod: PBBFAC,,, | Performed by: FAMILY MEDICINE

## 2020-06-22 PROCEDURE — 3051F HG A1C>EQUAL 7.0%<8.0%: CPT | Mod: CPTII,S$GLB,, | Performed by: FAMILY MEDICINE

## 2020-06-22 PROCEDURE — 99397 PER PM REEVAL EST PAT 65+ YR: CPT | Mod: 25,S$GLB,, | Performed by: FAMILY MEDICINE

## 2020-06-22 PROCEDURE — 90472 ZOSTER RECOMBINANT VACCINE: ICD-10-PCS | Mod: S$GLB,,, | Performed by: FAMILY MEDICINE

## 2020-06-22 PROCEDURE — 3078F DIAST BP <80 MM HG: CPT | Mod: CPTII,S$GLB,, | Performed by: FAMILY MEDICINE

## 2020-06-22 PROCEDURE — 90471 PNEUMOCOCCAL CONJUGATE VACCINE 13-VALENT LESS THAN 5YO & GREATER THAN: ICD-10-PCS | Mod: S$GLB,,, | Performed by: FAMILY MEDICINE

## 2020-06-22 PROCEDURE — 90750 ZOSTER RECOMBINANT VACCINE: ICD-10-PCS | Mod: S$GLB,,, | Performed by: FAMILY MEDICINE

## 2020-06-22 PROCEDURE — 3077F SYST BP >= 140 MM HG: CPT | Mod: CPTII,S$GLB,, | Performed by: FAMILY MEDICINE

## 2020-06-22 PROCEDURE — 90471 IMMUNIZATION ADMIN: CPT | Mod: S$GLB,,, | Performed by: FAMILY MEDICINE

## 2020-06-22 PROCEDURE — 90750 HZV VACC RECOMBINANT IM: CPT | Mod: S$GLB,,, | Performed by: FAMILY MEDICINE

## 2020-06-22 PROCEDURE — 90670 PCV13 VACCINE IM: CPT | Mod: S$GLB,,, | Performed by: FAMILY MEDICINE

## 2020-06-22 PROCEDURE — 3051F PR MOST RECENT HEMOGLOBIN A1C LEVEL 7.0 - < 8.0%: ICD-10-PCS | Mod: CPTII,S$GLB,, | Performed by: FAMILY MEDICINE

## 2020-06-22 PROCEDURE — 99397 PR PREVENTIVE VISIT,EST,65 & OVER: ICD-10-PCS | Mod: 25,S$GLB,, | Performed by: FAMILY MEDICINE

## 2020-06-22 PROCEDURE — 90670 PNEUMOCOCCAL CONJUGATE VACCINE 13-VALENT LESS THAN 5YO & GREATER THAN: ICD-10-PCS | Mod: S$GLB,,, | Performed by: FAMILY MEDICINE

## 2020-06-22 PROCEDURE — 99999 PR PBB SHADOW E&M-EST. PATIENT-LVL IV: ICD-10-PCS | Mod: PBBFAC,,, | Performed by: FAMILY MEDICINE

## 2020-06-22 PROCEDURE — 3077F PR MOST RECENT SYSTOLIC BLOOD PRESSURE >= 140 MM HG: ICD-10-PCS | Mod: CPTII,S$GLB,, | Performed by: FAMILY MEDICINE

## 2020-06-22 PROCEDURE — 3078F PR MOST RECENT DIASTOLIC BLOOD PRESSURE < 80 MM HG: ICD-10-PCS | Mod: CPTII,S$GLB,, | Performed by: FAMILY MEDICINE

## 2020-06-22 RX ORDER — DOXYCYCLINE 100 MG/1
CAPSULE ORAL
COMMUNITY
Start: 2020-06-05 | End: 2020-06-22

## 2020-06-22 RX ORDER — FLUOROMETHOLONE 1 MG/ML
SUSPENSION/ DROPS OPHTHALMIC
COMMUNITY
Start: 2020-06-18 | End: 2022-03-16

## 2020-06-23 NOTE — PROGRESS NOTES
Presents physical exam follow-up diabetes.  Blood pressure has been controlled though elevated today.  Sees pulmonary regarding bronchiectasis and emphysema.      Misha was seen today for annual exam.    Diagnoses and all orders for this visit:    Routine history and physical examination of adult    Type 2 diabetes mellitus without complication, without long-term current use of insulin  -     Ambulatory referral/consult to Optometry; Future    Hypertension associated with diabetes    Bronchiectasis without complication    Combined hyperlipidemia associated with type 2 diabetes mellitus    Pulmonary emphysema, unspecified emphysema type    Screening for HIV (human immunodeficiency virus)  -     HIV 1/2 Ag/Ab (4th Gen); Future    Other orders  -     Pneumococcal Conjugate Vaccine (13 Valent) (IM)  -     Zoster Recombinant Vaccine  -     Zoster Recombinant Vaccine      Recheck blood pressure in 3 weeks.  Follow-up laboratory already scheduled.  Continue follow-up with pulmonary.    Anticipatory guidance: Don't smoke.  Healthy diet and regular exercise recommended.      Diabetes Management Status    Statin: Taking  ACE/ARB: Taking    Screening or Prevention Patient's value Goal Complete/Controlled?   HgA1C Testing and Control   Lab Results   Component Value Date    HGBA1C 7.3 (H) 05/04/2020      Annually/Less than 8% Yes   Lipid profile : 01/17/2020 Annually Yes   LDL control Lab Results   Component Value Date    LDLCALC 88.0 01/17/2020    Annually/Less than 100 mg/dl  Yes   Nephropathy screening Lab Results   Component Value Date    LABMICR 7.0 05/04/2020     No results found for: PROTEINUA Annually Yes   Blood pressure BP Readings from Last 1 Encounters:   06/22/20 (!) 143/74    Less than 140/90 No   Dilated retinal exam : 08/08/2019 Annually Yes   Foot exam   : 06/22/2020 Annually Yes       Past Medical History:  Past Medical History:   Diagnosis Date    Adrenal adenoma 2008    bx benign    Arthritis      Bronchiectasis 5/15/2012    Cataract     COPD, mild 10/2/2017    Diabetes mellitus, type 2     Emphysema     Hemoptysis     Hyperlipidemia     Hyperplastic colon polyp     Hypertension     Lung nodules     small stable on serial ct    Lymphocytosis     reactive    Peptic ulcer disease     Pneumonia     Tobacco abuse 5/15/2012     Past Surgical History:   Procedure Laterality Date    APPENDECTOMY      BONE MARROW BIOPSY      BRONCHOSCOPY      COLONOSCOPY  8/6/2013         KNEE ARTHROSCOPY Left     MULTIPLE TOOTH EXTRACTIONS       Review of patient's allergies indicates:   Allergen Reactions    Aspirin      Other reaction(s): Stomach upset  Other reaction(s): Nausea     Current Outpatient Medications on File Prior to Visit   Medication Sig Dispense Refill    ALBUTEROL INHL Inhale into the lungs as needed.       benazepriL (LOTENSIN) 40 MG tablet TAKE 1 TABLET BY MOUTH ONCE DAILY 90 tablet 0    blood sugar diagnostic (BLOOD GLUCOSE TEST) Strp Test glucose 1 x daily (use insurance preferred brand) 50 strip 11    blood-glucose meter Misc Use as directed 1 each prn    fluorometholone 0.1% (FML) 0.1 % DrpS INSTILL ONE DROP into the right eye FOUR TIMES DAILY FOR 10 DAYS      hydroCHLOROthiazide (MICROZIDE) 12.5 mg capsule TAKE ONE CAPSULE BY MOUTH ONCE DAILY 90 capsule 0    lancets Misc As directed 100 each prn    pioglitazone (ACTOS) 30 MG tablet Take 1 tablet (30 mg total) by mouth once daily. 90 tablet 3    pravastatin (PRAVACHOL) 20 MG tablet Take 1 tablet (20 mg total) by mouth once daily. 90 tablet 3    metFORMIN (GLUCOPHAGE-XR) 500 MG 24 hr tablet Take 4 tablets (2,000 mg total) by mouth daily with breakfast. 360 tablet 1    [DISCONTINUED] doxycycline (MONODOX) 100 MG capsule        No current facility-administered medications on file prior to visit.      Social History     Socioeconomic History    Marital status:      Spouse name: Not on file    Number of children: Not on  file    Years of education: Not on file    Highest education level: Not on file   Occupational History    Not on file   Social Needs    Financial resource strain: Not on file    Food insecurity     Worry: Not on file     Inability: Not on file    Transportation needs     Medical: Not on file     Non-medical: Not on file   Tobacco Use    Smoking status: Former Smoker     Packs/day: 0.25     Years: 53.00     Pack years: 13.25     Types: Cigarettes     Quit date: 2018     Years since quittin.5    Smokeless tobacco: Never Used    Tobacco comment: 1 pack every 3 days   Substance and Sexual Activity    Alcohol use: No     Alcohol/week: 1.0 standard drinks     Types: 1 Cans of beer per week     Comment: 1 beer every 2 weeks    Drug use: No    Sexual activity: Not on file   Lifestyle    Physical activity     Days per week: Not on file     Minutes per session: Not on file    Stress: Not on file   Relationships    Social connections     Talks on phone: Not on file     Gets together: Not on file     Attends Scientologist service: Not on file     Active member of club or organization: Not on file     Attends meetings of clubs or organizations: Not on file     Relationship status: Not on file   Other Topics Concern    Not on file   Social History Narrative    Not on file     Family History   Problem Relation Age of Onset    Kidney failure Father     Diabetes Father     Heart disease Mother 60    Diabetes Brother     Cancer Brother         prostate    Glaucoma Brother         ?    Cancer Brother     Amblyopia Neg Hx     Blindness Neg Hx     Cataracts Neg Hx     Hypertension Neg Hx     Macular degeneration Neg Hx     Retinal detachment Neg Hx     Strabismus Neg Hx     Stroke Neg Hx     Thyroid disease Neg Hx              ROS:  GENERAL: No fever, chills,  or significant weight changes.  HEENT: No headache or hearing complaints.  No dysphagia  Eyes: No vision complaints  CHEST: Denies HERN[place]NDE[place],  cyanosis, wheezing, cough and sputum production.  CARDIOVASCULAR: Denies chest pain, PND, orthopnea or reduced exercise tolerance.  ABDOMEN: Appetite fine. Denies diarrhea, abdominal pain, hematemesis or blood in stool.  URINARY: No flank pain, dysuria or hematuria.  MUSCULOSKELETAL: No warmth swelling or tenderness of the joints  NEUROLOGIC: No focal weakness numbness or paresthesia  PSYCHIATRIC: Denies depression      OBJECTIVE:   Vitals:    06/22/20 1541   BP: (!) 143/74   Pulse: (!) 59   Temp: 98 °F (36.7 °C)   Weight: 81.9 kg (180 lb 9.6 oz)   Height: 6' (1.829 m)     Wt Readings from Last 3 Encounters:   06/22/20 81.9 kg (180 lb 9.6 oz)   06/17/19 79 kg (174 lb 3.2 oz)   03/25/19 81 kg (178 lb 9.6 oz)       APPEARANCE: Well nourished, well developed, in no acute distress.   HEAD: Normocephalic. Atraumatic. No sinus tenderness.   EYES:   Right eye: Pupil reactive. Conjunctiva clear.   Left eye: Pupil reactive. Conjunctiva clear.   Both fundi: Grossly normal to nondilated exam. EOMI.   EARS: TM's intact. Light reflex normal. No retraction or perforation.   NOSE: clear.   MOUTH & THROAT: No pharyngeal erythema or exudate. No lesions.   NECK: No bruits. No JVD. No cervical lymphadenopathy. No thyromegaly.   CHEST: Breath sounds clear bilaterally. Normal respiratory effort   CARDIOVASCULAR: Normal rate. Regular rhythm. No murmurs. No rub. No gallops.   ABDOMEN: Bowel sounds normal. Soft. No tenderness. No organomegaly.   PERIPHERAL VASCULAR: No cyanosis. No clubbing. No edema.   NEUROLOGIC: No focal findings.    Feet:Sensation in the feet intact to monofilament testing.   No significant foot lesions.Pulses palpable.  MENTAL STATUS: Alert. Oriented x 3.

## 2020-07-16 ENCOUNTER — CLINICAL SUPPORT (OUTPATIENT)
Dept: FAMILY MEDICINE | Facility: CLINIC | Age: 65
End: 2020-07-16
Payer: COMMERCIAL

## 2020-07-16 VITALS — DIASTOLIC BLOOD PRESSURE: 73 MMHG | HEART RATE: 52 BPM | SYSTOLIC BLOOD PRESSURE: 156 MMHG

## 2020-07-16 DIAGNOSIS — Z01.30 BLOOD PRESSURE CHECK: Primary | ICD-10-CM

## 2020-07-16 PROCEDURE — 99999 PR PBB SHADOW E&M-EST. PATIENT-LVL I: ICD-10-PCS | Mod: PBBFAC,,,

## 2020-07-16 PROCEDURE — 99999 PR PBB SHADOW E&M-EST. PATIENT-LVL I: CPT | Mod: PBBFAC,,,

## 2020-07-16 RX ORDER — HYDROCHLOROTHIAZIDE 25 MG/1
25 TABLET ORAL DAILY
Qty: 30 TABLET | Refills: 11 | Status: SHIPPED | OUTPATIENT
Start: 2020-07-16 | End: 2020-09-12 | Stop reason: SDUPTHER

## 2020-07-16 NOTE — PROGRESS NOTES
Blood pressure still elevated.  Recommend increase hydrochlorothiazide 25 mg daily.  Prescription sent to pharmacy.  Recheck blood pressure with nurse in 4 weeks.

## 2020-08-06 ENCOUNTER — LAB VISIT (OUTPATIENT)
Dept: LAB | Facility: HOSPITAL | Age: 65
End: 2020-08-06
Attending: FAMILY MEDICINE
Payer: COMMERCIAL

## 2020-08-06 DIAGNOSIS — E11.9 TYPE 2 DIABETES MELLITUS WITHOUT COMPLICATION, WITHOUT LONG-TERM CURRENT USE OF INSULIN: ICD-10-CM

## 2020-08-06 DIAGNOSIS — Z11.4 SCREENING FOR HIV (HUMAN IMMUNODEFICIENCY VIRUS): ICD-10-CM

## 2020-08-06 PROCEDURE — 36415 COLL VENOUS BLD VENIPUNCTURE: CPT | Mod: PO

## 2020-08-06 PROCEDURE — 86703 HIV-1/HIV-2 1 RESULT ANTBDY: CPT

## 2020-08-06 PROCEDURE — 83036 HEMOGLOBIN GLYCOSYLATED A1C: CPT

## 2020-08-07 LAB
ESTIMATED AVG GLUCOSE: 154 MG/DL (ref 68–131)
HBA1C MFR BLD HPLC: 7 % (ref 4–5.6)
HIV 1+2 AB+HIV1 P24 AG SERPL QL IA: NEGATIVE

## 2020-09-12 DIAGNOSIS — I15.2 HYPERTENSION ASSOCIATED WITH DIABETES: ICD-10-CM

## 2020-09-12 DIAGNOSIS — E11.59 HYPERTENSION ASSOCIATED WITH DIABETES: ICD-10-CM

## 2020-09-12 RX ORDER — PIOGLITAZONEHYDROCHLORIDE 30 MG/1
30 TABLET ORAL DAILY
Qty: 90 TABLET | Refills: 1 | Status: SHIPPED | OUTPATIENT
Start: 2020-09-12 | End: 2021-03-08

## 2020-09-12 RX ORDER — BENAZEPRIL HYDROCHLORIDE 40 MG/1
TABLET ORAL
Qty: 90 TABLET | Refills: 0 | Status: SHIPPED | OUTPATIENT
Start: 2020-09-12 | End: 2020-12-10

## 2020-09-12 RX ORDER — HYDROCHLOROTHIAZIDE 25 MG/1
25 TABLET ORAL DAILY
Qty: 30 TABLET | Refills: 11 | OUTPATIENT
Start: 2020-09-12 | End: 2021-09-12

## 2020-09-12 RX ORDER — HYDROCHLOROTHIAZIDE 25 MG/1
25 TABLET ORAL DAILY
Qty: 90 TABLET | Refills: 0 | Status: SHIPPED | OUTPATIENT
Start: 2020-09-12 | End: 2020-12-10

## 2020-09-12 RX ORDER — PIOGLITAZONEHYDROCHLORIDE 15 MG/1
15 TABLET ORAL DAILY
Qty: 90 TABLET | Refills: 3 | OUTPATIENT
Start: 2020-09-12 | End: 2021-09-12

## 2020-09-12 NOTE — TELEPHONE ENCOUNTER
The hydrochlorothiazide was previously increased to 25 mg.  Actos was increased to 30 mg daily.  Please make sure that he takes the correct medication.  Needs nurse blood pressure check.

## 2020-09-21 ENCOUNTER — PATIENT OUTREACH (OUTPATIENT)
Dept: ADMINISTRATIVE | Facility: HOSPITAL | Age: 65
End: 2020-09-21

## 2020-10-01 ENCOUNTER — CLINICAL SUPPORT (OUTPATIENT)
Dept: FAMILY MEDICINE | Facility: CLINIC | Age: 65
End: 2020-10-01
Payer: COMMERCIAL

## 2020-10-01 VITALS — SYSTOLIC BLOOD PRESSURE: 107 MMHG | DIASTOLIC BLOOD PRESSURE: 69 MMHG | HEART RATE: 62 BPM

## 2020-10-01 DIAGNOSIS — Z01.30 BP CHECK: Primary | ICD-10-CM

## 2020-10-01 PROCEDURE — 99999 PR PBB SHADOW E&M-EST. PATIENT-LVL III: CPT | Mod: PBBFAC,,,

## 2020-10-01 PROCEDURE — 99999 PR PBB SHADOW E&M-EST. PATIENT-LVL III: ICD-10-PCS | Mod: PBBFAC,,,

## 2020-12-10 DIAGNOSIS — I15.2 HYPERTENSION ASSOCIATED WITH DIABETES: ICD-10-CM

## 2020-12-10 DIAGNOSIS — E11.59 HYPERTENSION ASSOCIATED WITH DIABETES: ICD-10-CM

## 2020-12-10 RX ORDER — HYDROCHLOROTHIAZIDE 25 MG/1
25 TABLET ORAL DAILY
Qty: 90 TABLET | Refills: 0 | Status: SHIPPED | OUTPATIENT
Start: 2020-12-10 | End: 2021-11-10 | Stop reason: SDUPTHER

## 2020-12-10 RX ORDER — BENAZEPRIL HYDROCHLORIDE 40 MG/1
TABLET ORAL
Qty: 90 TABLET | Refills: 0 | Status: SHIPPED | OUTPATIENT
Start: 2020-12-10 | End: 2021-03-08

## 2021-02-03 ENCOUNTER — LAB VISIT (OUTPATIENT)
Dept: LAB | Facility: HOSPITAL | Age: 66
End: 2021-02-03
Attending: FAMILY MEDICINE
Payer: COMMERCIAL

## 2021-02-03 DIAGNOSIS — E11.9 TYPE 2 DIABETES MELLITUS WITHOUT COMPLICATION, WITHOUT LONG-TERM CURRENT USE OF INSULIN: ICD-10-CM

## 2021-02-03 PROCEDURE — 80053 COMPREHEN METABOLIC PANEL: CPT

## 2021-02-03 PROCEDURE — 80061 LIPID PANEL: CPT

## 2021-02-03 PROCEDURE — 36415 COLL VENOUS BLD VENIPUNCTURE: CPT | Mod: PO

## 2021-02-03 PROCEDURE — 83036 HEMOGLOBIN GLYCOSYLATED A1C: CPT

## 2021-02-04 LAB
ALBUMIN SERPL BCP-MCNC: 4 G/DL (ref 3.5–5.2)
ALP SERPL-CCNC: 82 U/L (ref 55–135)
ALT SERPL W/O P-5'-P-CCNC: 16 U/L (ref 10–44)
ANION GAP SERPL CALC-SCNC: 12 MMOL/L (ref 8–16)
AST SERPL-CCNC: 37 U/L (ref 10–40)
BILIRUB SERPL-MCNC: 0.9 MG/DL (ref 0.1–1)
BUN SERPL-MCNC: 9 MG/DL (ref 8–23)
CALCIUM SERPL-MCNC: 9.3 MG/DL (ref 8.7–10.5)
CHLORIDE SERPL-SCNC: 105 MMOL/L (ref 95–110)
CHOLEST SERPL-MCNC: 145 MG/DL (ref 120–199)
CHOLEST/HDLC SERPL: 2.8 {RATIO} (ref 2–5)
CO2 SERPL-SCNC: 20 MMOL/L (ref 23–29)
CREAT SERPL-MCNC: 1.1 MG/DL (ref 0.5–1.4)
EST. GFR  (AFRICAN AMERICAN): >60 ML/MIN/1.73 M^2
EST. GFR  (NON AFRICAN AMERICAN): >60 ML/MIN/1.73 M^2
ESTIMATED AVG GLUCOSE: 148 MG/DL (ref 68–131)
GLUCOSE SERPL-MCNC: 131 MG/DL (ref 70–110)
HBA1C MFR BLD: 6.8 % (ref 4–5.6)
HDLC SERPL-MCNC: 52 MG/DL (ref 40–75)
HDLC SERPL: 35.9 % (ref 20–50)
LDLC SERPL CALC-MCNC: 82.4 MG/DL (ref 63–159)
NONHDLC SERPL-MCNC: 93 MG/DL
POTASSIUM SERPL-SCNC: 4 MMOL/L (ref 3.5–5.1)
PROT SERPL-MCNC: 7.3 G/DL (ref 6–8.4)
SODIUM SERPL-SCNC: 137 MMOL/L (ref 136–145)
TRIGL SERPL-MCNC: 53 MG/DL (ref 30–150)

## 2021-10-04 ENCOUNTER — TELEPHONE (OUTPATIENT)
Dept: FAMILY MEDICINE | Facility: CLINIC | Age: 66
End: 2021-10-04

## 2021-10-04 ENCOUNTER — PATIENT OUTREACH (OUTPATIENT)
Dept: ADMINISTRATIVE | Facility: HOSPITAL | Age: 66
End: 2021-10-04

## 2021-10-14 ENCOUNTER — PATIENT OUTREACH (OUTPATIENT)
Dept: ADMINISTRATIVE | Facility: HOSPITAL | Age: 66
End: 2021-10-14

## 2021-10-26 ENCOUNTER — PATIENT OUTREACH (OUTPATIENT)
Dept: ADMINISTRATIVE | Facility: HOSPITAL | Age: 66
End: 2021-10-26
Payer: COMMERCIAL

## 2021-10-28 NOTE — TELEPHONE ENCOUNTER
----- Message from Eliona Emanuel sent at 3/15/2018  9:02 AM CDT -----  Contact: [facility]  She stated that she needs the pt's referral for the pt to continue receiving Physical therapy    She can be reached at [phone]  Fax Number [phone]  
Therapy order faxed.  
negative -  no rash

## 2021-11-02 ENCOUNTER — TELEPHONE (OUTPATIENT)
Dept: FAMILY MEDICINE | Facility: CLINIC | Age: 66
End: 2021-11-02
Payer: COMMERCIAL

## 2021-11-02 ENCOUNTER — PATIENT OUTREACH (OUTPATIENT)
Dept: ADMINISTRATIVE | Facility: HOSPITAL | Age: 66
End: 2021-11-02
Payer: COMMERCIAL

## 2021-11-02 DIAGNOSIS — Z12.5 SCREENING FOR PROSTATE CANCER: ICD-10-CM

## 2021-11-02 DIAGNOSIS — E11.59 HYPERTENSION ASSOCIATED WITH DIABETES: ICD-10-CM

## 2021-11-02 DIAGNOSIS — I15.2 HYPERTENSION ASSOCIATED WITH DIABETES: ICD-10-CM

## 2021-11-02 DIAGNOSIS — E78.2 COMBINED HYPERLIPIDEMIA ASSOCIATED WITH TYPE 2 DIABETES MELLITUS: ICD-10-CM

## 2021-11-02 DIAGNOSIS — E11.69 COMBINED HYPERLIPIDEMIA ASSOCIATED WITH TYPE 2 DIABETES MELLITUS: ICD-10-CM

## 2021-11-02 DIAGNOSIS — Z00.00 ROUTINE HISTORY AND PHYSICAL EXAMINATION OF ADULT: Primary | ICD-10-CM

## 2021-11-04 ENCOUNTER — PATIENT OUTREACH (OUTPATIENT)
Dept: ADMINISTRATIVE | Facility: OTHER | Age: 66
End: 2021-11-04
Payer: COMMERCIAL

## 2021-11-05 ENCOUNTER — LAB VISIT (OUTPATIENT)
Dept: LAB | Facility: HOSPITAL | Age: 66
End: 2021-11-05
Attending: FAMILY MEDICINE
Payer: COMMERCIAL

## 2021-11-05 DIAGNOSIS — E11.59 HYPERTENSION ASSOCIATED WITH DIABETES: ICD-10-CM

## 2021-11-05 DIAGNOSIS — I15.2 HYPERTENSION ASSOCIATED WITH DIABETES: ICD-10-CM

## 2021-11-05 DIAGNOSIS — E78.2 COMBINED HYPERLIPIDEMIA ASSOCIATED WITH TYPE 2 DIABETES MELLITUS: ICD-10-CM

## 2021-11-05 DIAGNOSIS — E11.69 COMBINED HYPERLIPIDEMIA ASSOCIATED WITH TYPE 2 DIABETES MELLITUS: ICD-10-CM

## 2021-11-05 DIAGNOSIS — Z00.00 ROUTINE HISTORY AND PHYSICAL EXAMINATION OF ADULT: ICD-10-CM

## 2021-11-05 DIAGNOSIS — Z12.5 SCREENING FOR PROSTATE CANCER: ICD-10-CM

## 2021-11-05 LAB
ALBUMIN SERPL BCP-MCNC: 3.9 G/DL (ref 3.5–5.2)
ALP SERPL-CCNC: 80 U/L (ref 55–135)
ALT SERPL W/O P-5'-P-CCNC: 15 U/L (ref 10–44)
ANION GAP SERPL CALC-SCNC: 5 MMOL/L (ref 8–16)
AST SERPL-CCNC: 35 U/L (ref 10–40)
BILIRUB SERPL-MCNC: 1.4 MG/DL (ref 0.1–1)
BUN SERPL-MCNC: 7 MG/DL (ref 8–23)
CALCIUM SERPL-MCNC: 9.3 MG/DL (ref 8.7–10.5)
CHLORIDE SERPL-SCNC: 105 MMOL/L (ref 95–110)
CHOLEST SERPL-MCNC: 135 MG/DL (ref 120–199)
CHOLEST/HDLC SERPL: 2.9 {RATIO} (ref 2–5)
CO2 SERPL-SCNC: 26 MMOL/L (ref 23–29)
COMPLEXED PSA SERPL-MCNC: 0.98 NG/ML (ref 0–4)
CREAT SERPL-MCNC: 1.1 MG/DL (ref 0.5–1.4)
EST. GFR  (AFRICAN AMERICAN): >60 ML/MIN/1.73 M^2
EST. GFR  (NON AFRICAN AMERICAN): >60 ML/MIN/1.73 M^2
ESTIMATED AVG GLUCOSE: 151 MG/DL (ref 68–131)
GLUCOSE SERPL-MCNC: 119 MG/DL (ref 70–110)
HBA1C MFR BLD: 6.9 % (ref 4–5.6)
HDLC SERPL-MCNC: 46 MG/DL (ref 40–75)
HDLC SERPL: 34.1 % (ref 20–50)
LDLC SERPL CALC-MCNC: 76.6 MG/DL (ref 63–159)
NONHDLC SERPL-MCNC: 89 MG/DL
POTASSIUM SERPL-SCNC: 3.9 MMOL/L (ref 3.5–5.1)
PROT SERPL-MCNC: 7.6 G/DL (ref 6–8.4)
SODIUM SERPL-SCNC: 136 MMOL/L (ref 136–145)
TRIGL SERPL-MCNC: 62 MG/DL (ref 30–150)
VIT B12 SERPL-MCNC: 271 PG/ML (ref 210–950)

## 2021-11-05 PROCEDURE — 83036 HEMOGLOBIN GLYCOSYLATED A1C: CPT | Performed by: FAMILY MEDICINE

## 2021-11-05 PROCEDURE — 80061 LIPID PANEL: CPT | Performed by: FAMILY MEDICINE

## 2021-11-05 PROCEDURE — 84153 ASSAY OF PSA TOTAL: CPT | Performed by: FAMILY MEDICINE

## 2021-11-05 PROCEDURE — 82607 VITAMIN B-12: CPT | Performed by: FAMILY MEDICINE

## 2021-11-05 PROCEDURE — 36415 COLL VENOUS BLD VENIPUNCTURE: CPT | Mod: PO | Performed by: FAMILY MEDICINE

## 2021-11-05 PROCEDURE — 80053 COMPREHEN METABOLIC PANEL: CPT | Performed by: FAMILY MEDICINE

## 2021-11-08 ENCOUNTER — OFFICE VISIT (OUTPATIENT)
Dept: OPTOMETRY | Facility: CLINIC | Age: 66
End: 2021-11-08
Payer: COMMERCIAL

## 2021-11-08 DIAGNOSIS — E11.9 DIABETES MELLITUS TYPE 2 WITHOUT RETINOPATHY: Primary | ICD-10-CM

## 2021-11-08 DIAGNOSIS — H57.11 DISCOMFORT OF EYE, RIGHT: ICD-10-CM

## 2021-11-08 DIAGNOSIS — H40.013 OAG (OPEN ANGLE GLAUCOMA) SUSPECT, LOW RISK, BILATERAL: ICD-10-CM

## 2021-11-08 DIAGNOSIS — E11.36 CATARACT ASSOCIATED WITH TYPE 2 DIABETES MELLITUS: ICD-10-CM

## 2021-11-08 DIAGNOSIS — H52.13 MYOPIA WITH ASTIGMATISM AND PRESBYOPIA, BILATERAL: ICD-10-CM

## 2021-11-08 DIAGNOSIS — H43.393 VITREOUS FLOATERS, BILATERAL: ICD-10-CM

## 2021-11-08 DIAGNOSIS — H52.4 MYOPIA WITH ASTIGMATISM AND PRESBYOPIA, BILATERAL: ICD-10-CM

## 2021-11-08 DIAGNOSIS — H52.203 MYOPIA WITH ASTIGMATISM AND PRESBYOPIA, BILATERAL: ICD-10-CM

## 2021-11-08 PROCEDURE — 3066F PR DOCUMENTATION OF TREATMENT FOR NEPHROPATHY: ICD-10-PCS | Mod: CPTII,S$GLB,, | Performed by: OPTOMETRIST

## 2021-11-08 PROCEDURE — 3066F NEPHROPATHY DOC TX: CPT | Mod: CPTII,S$GLB,, | Performed by: OPTOMETRIST

## 2021-11-08 PROCEDURE — 1159F MED LIST DOCD IN RCRD: CPT | Mod: CPTII,S$GLB,, | Performed by: OPTOMETRIST

## 2021-11-08 PROCEDURE — 2023F PR DILATED RETINAL EXAM W/O EVID OF RETINOPATHY: ICD-10-PCS | Mod: CPTII,S$GLB,, | Performed by: OPTOMETRIST

## 2021-11-08 PROCEDURE — 92133 POSTERIOR SEGMENT OCT OPTIC NERVE(OCULAR COHERENCE TOMOGRAPHY) - OU - BOTH EYES: ICD-10-PCS | Mod: S$GLB,,, | Performed by: OPTOMETRIST

## 2021-11-08 PROCEDURE — 1126F AMNT PAIN NOTED NONE PRSNT: CPT | Mod: CPTII,S$GLB,, | Performed by: OPTOMETRIST

## 2021-11-08 PROCEDURE — 3061F NEG MICROALBUMINURIA REV: CPT | Mod: CPTII,S$GLB,, | Performed by: OPTOMETRIST

## 2021-11-08 PROCEDURE — 99999 PR PBB SHADOW E&M-EST. PATIENT-LVL III: ICD-10-PCS | Mod: PBBFAC,,, | Performed by: OPTOMETRIST

## 2021-11-08 PROCEDURE — 92133 CPTRZD OPH DX IMG PST SGM ON: CPT | Mod: S$GLB,,, | Performed by: OPTOMETRIST

## 2021-11-08 PROCEDURE — 99999 PR PBB SHADOW E&M-EST. PATIENT-LVL III: CPT | Mod: PBBFAC,,, | Performed by: OPTOMETRIST

## 2021-11-08 PROCEDURE — 4010F PR ACE/ARB THEARPY RXD/TAKEN: ICD-10-PCS | Mod: CPTII,S$GLB,, | Performed by: OPTOMETRIST

## 2021-11-08 PROCEDURE — 3044F PR MOST RECENT HEMOGLOBIN A1C LEVEL <7.0%: ICD-10-PCS | Mod: CPTII,S$GLB,, | Performed by: OPTOMETRIST

## 2021-11-08 PROCEDURE — 3061F PR NEG MICROALBUMINURIA RESULT DOCUMENTED/REVIEW: ICD-10-PCS | Mod: CPTII,S$GLB,, | Performed by: OPTOMETRIST

## 2021-11-08 PROCEDURE — 92014 PR EYE EXAM, EST PATIENT,COMPREHESV: ICD-10-PCS | Mod: S$GLB,,, | Performed by: OPTOMETRIST

## 2021-11-08 PROCEDURE — 3044F HG A1C LEVEL LT 7.0%: CPT | Mod: CPTII,S$GLB,, | Performed by: OPTOMETRIST

## 2021-11-08 PROCEDURE — 3288F PR FALLS RISK ASSESSMENT DOCUMENTED: ICD-10-PCS | Mod: CPTII,S$GLB,, | Performed by: OPTOMETRIST

## 2021-11-08 PROCEDURE — 1159F PR MEDICATION LIST DOCUMENTED IN MEDICAL RECORD: ICD-10-PCS | Mod: CPTII,S$GLB,, | Performed by: OPTOMETRIST

## 2021-11-08 PROCEDURE — 92014 COMPRE OPH EXAM EST PT 1/>: CPT | Mod: S$GLB,,, | Performed by: OPTOMETRIST

## 2021-11-08 PROCEDURE — 3288F FALL RISK ASSESSMENT DOCD: CPT | Mod: CPTII,S$GLB,, | Performed by: OPTOMETRIST

## 2021-11-08 PROCEDURE — 1101F PR PT FALLS ASSESS DOC 0-1 FALLS W/OUT INJ PAST YR: ICD-10-PCS | Mod: CPTII,S$GLB,, | Performed by: OPTOMETRIST

## 2021-11-08 PROCEDURE — 1126F PR PAIN SEVERITY QUANTIFIED, NO PAIN PRESENT: ICD-10-PCS | Mod: CPTII,S$GLB,, | Performed by: OPTOMETRIST

## 2021-11-08 PROCEDURE — 4010F ACE/ARB THERAPY RXD/TAKEN: CPT | Mod: CPTII,S$GLB,, | Performed by: OPTOMETRIST

## 2021-11-08 PROCEDURE — 2023F DILAT RTA XM W/O RTNOPTHY: CPT | Mod: CPTII,S$GLB,, | Performed by: OPTOMETRIST

## 2021-11-08 PROCEDURE — 1101F PT FALLS ASSESS-DOCD LE1/YR: CPT | Mod: CPTII,S$GLB,, | Performed by: OPTOMETRIST

## 2021-11-08 RX ORDER — ALBUTEROL SULFATE 90 UG/1
2 AEROSOL, METERED RESPIRATORY (INHALATION) EVERY 6 HOURS PRN
COMMUNITY
Start: 2020-12-21

## 2021-11-09 ENCOUNTER — TELEPHONE (OUTPATIENT)
Dept: FAMILY MEDICINE | Facility: CLINIC | Age: 66
End: 2021-11-09
Payer: COMMERCIAL

## 2021-11-09 DIAGNOSIS — R17 ELEVATED BILIRUBIN: Primary | ICD-10-CM

## 2021-11-10 ENCOUNTER — OFFICE VISIT (OUTPATIENT)
Dept: FAMILY MEDICINE | Facility: CLINIC | Age: 66
End: 2021-11-10
Payer: COMMERCIAL

## 2021-11-10 VITALS
HEART RATE: 87 BPM | DIASTOLIC BLOOD PRESSURE: 76 MMHG | HEIGHT: 72 IN | WEIGHT: 177 LBS | SYSTOLIC BLOOD PRESSURE: 148 MMHG | BODY MASS INDEX: 23.98 KG/M2 | TEMPERATURE: 98 F

## 2021-11-10 DIAGNOSIS — Z00.00 ROUTINE HISTORY AND PHYSICAL EXAMINATION OF ADULT: Primary | ICD-10-CM

## 2021-11-10 DIAGNOSIS — E11.59 HYPERTENSION ASSOCIATED WITH DIABETES: ICD-10-CM

## 2021-11-10 DIAGNOSIS — E11.9 TYPE 2 DIABETES MELLITUS WITHOUT COMPLICATION, WITHOUT LONG-TERM CURRENT USE OF INSULIN: ICD-10-CM

## 2021-11-10 DIAGNOSIS — Z91.148 NONCOMPLIANCE WITH MEDICATION TREATMENT DUE TO UNDERUSE OF MEDICATION: ICD-10-CM

## 2021-11-10 DIAGNOSIS — J44.9 COPD, MILD: ICD-10-CM

## 2021-11-10 DIAGNOSIS — J47.9 BRONCHIECTASIS WITHOUT COMPLICATION: ICD-10-CM

## 2021-11-10 DIAGNOSIS — I15.2 HYPERTENSION ASSOCIATED WITH DIABETES: ICD-10-CM

## 2021-11-10 PROCEDURE — 3066F PR DOCUMENTATION OF TREATMENT FOR NEPHROPATHY: ICD-10-PCS | Mod: CPTII,S$GLB,, | Performed by: FAMILY MEDICINE

## 2021-11-10 PROCEDURE — 3077F SYST BP >= 140 MM HG: CPT | Mod: CPTII,S$GLB,, | Performed by: FAMILY MEDICINE

## 2021-11-10 PROCEDURE — 3078F PR MOST RECENT DIASTOLIC BLOOD PRESSURE < 80 MM HG: ICD-10-PCS | Mod: CPTII,S$GLB,, | Performed by: FAMILY MEDICINE

## 2021-11-10 PROCEDURE — 3008F BODY MASS INDEX DOCD: CPT | Mod: CPTII,S$GLB,, | Performed by: FAMILY MEDICINE

## 2021-11-10 PROCEDURE — 1101F PR PT FALLS ASSESS DOC 0-1 FALLS W/OUT INJ PAST YR: ICD-10-PCS | Mod: CPTII,S$GLB,, | Performed by: FAMILY MEDICINE

## 2021-11-10 PROCEDURE — 90471 IMMUNIZATION ADMIN: CPT | Mod: S$GLB,,, | Performed by: FAMILY MEDICINE

## 2021-11-10 PROCEDURE — 90694 VACC AIIV4 NO PRSRV 0.5ML IM: CPT | Mod: S$GLB,,, | Performed by: FAMILY MEDICINE

## 2021-11-10 PROCEDURE — 90471 FLU VACCINE - QUADRIVALENT - ADJUVANTED: ICD-10-PCS | Mod: S$GLB,,, | Performed by: FAMILY MEDICINE

## 2021-11-10 PROCEDURE — 1101F PT FALLS ASSESS-DOCD LE1/YR: CPT | Mod: CPTII,S$GLB,, | Performed by: FAMILY MEDICINE

## 2021-11-10 PROCEDURE — 99999 PR PBB SHADOW E&M-EST. PATIENT-LVL IV: CPT | Mod: PBBFAC,,, | Performed by: FAMILY MEDICINE

## 2021-11-10 PROCEDURE — 3044F HG A1C LEVEL LT 7.0%: CPT | Mod: CPTII,S$GLB,, | Performed by: FAMILY MEDICINE

## 2021-11-10 PROCEDURE — 99999 PR PBB SHADOW E&M-EST. PATIENT-LVL IV: ICD-10-PCS | Mod: PBBFAC,,, | Performed by: FAMILY MEDICINE

## 2021-11-10 PROCEDURE — 99397 PER PM REEVAL EST PAT 65+ YR: CPT | Mod: 25,S$GLB,, | Performed by: FAMILY MEDICINE

## 2021-11-10 PROCEDURE — 3008F PR BODY MASS INDEX (BMI) DOCUMENTED: ICD-10-PCS | Mod: CPTII,S$GLB,, | Performed by: FAMILY MEDICINE

## 2021-11-10 PROCEDURE — 4010F ACE/ARB THERAPY RXD/TAKEN: CPT | Mod: CPTII,S$GLB,, | Performed by: FAMILY MEDICINE

## 2021-11-10 PROCEDURE — 99397 PR PREVENTIVE VISIT,EST,65 & OVER: ICD-10-PCS | Mod: 25,S$GLB,, | Performed by: FAMILY MEDICINE

## 2021-11-10 PROCEDURE — 3061F PR NEG MICROALBUMINURIA RESULT DOCUMENTED/REVIEW: ICD-10-PCS | Mod: CPTII,S$GLB,, | Performed by: FAMILY MEDICINE

## 2021-11-10 PROCEDURE — 3288F FALL RISK ASSESSMENT DOCD: CPT | Mod: CPTII,S$GLB,, | Performed by: FAMILY MEDICINE

## 2021-11-10 PROCEDURE — 3044F PR MOST RECENT HEMOGLOBIN A1C LEVEL <7.0%: ICD-10-PCS | Mod: CPTII,S$GLB,, | Performed by: FAMILY MEDICINE

## 2021-11-10 PROCEDURE — 3066F NEPHROPATHY DOC TX: CPT | Mod: CPTII,S$GLB,, | Performed by: FAMILY MEDICINE

## 2021-11-10 PROCEDURE — 1159F PR MEDICATION LIST DOCUMENTED IN MEDICAL RECORD: ICD-10-PCS | Mod: CPTII,S$GLB,, | Performed by: FAMILY MEDICINE

## 2021-11-10 PROCEDURE — 3078F DIAST BP <80 MM HG: CPT | Mod: CPTII,S$GLB,, | Performed by: FAMILY MEDICINE

## 2021-11-10 PROCEDURE — 4010F PR ACE/ARB THEARPY RXD/TAKEN: ICD-10-PCS | Mod: CPTII,S$GLB,, | Performed by: FAMILY MEDICINE

## 2021-11-10 PROCEDURE — 3077F PR MOST RECENT SYSTOLIC BLOOD PRESSURE >= 140 MM HG: ICD-10-PCS | Mod: CPTII,S$GLB,, | Performed by: FAMILY MEDICINE

## 2021-11-10 PROCEDURE — 1159F MED LIST DOCD IN RCRD: CPT | Mod: CPTII,S$GLB,, | Performed by: FAMILY MEDICINE

## 2021-11-10 PROCEDURE — 90694 FLU VACCINE - QUADRIVALENT - ADJUVANTED: ICD-10-PCS | Mod: S$GLB,,, | Performed by: FAMILY MEDICINE

## 2021-11-10 PROCEDURE — 3288F PR FALLS RISK ASSESSMENT DOCUMENTED: ICD-10-PCS | Mod: CPTII,S$GLB,, | Performed by: FAMILY MEDICINE

## 2021-11-10 PROCEDURE — 3061F NEG MICROALBUMINURIA REV: CPT | Mod: CPTII,S$GLB,, | Performed by: FAMILY MEDICINE

## 2021-11-10 RX ORDER — BENAZEPRIL HYDROCHLORIDE 40 MG/1
40 TABLET ORAL DAILY
Qty: 90 TABLET | Refills: 0 | Status: SHIPPED | OUTPATIENT
Start: 2021-11-10 | End: 2022-02-07

## 2021-11-10 RX ORDER — PIOGLITAZONEHYDROCHLORIDE 30 MG/1
30 TABLET ORAL DAILY
Qty: 90 TABLET | Refills: 3 | Status: SHIPPED | OUTPATIENT
Start: 2021-11-10 | End: 2022-12-01 | Stop reason: SDUPTHER

## 2021-11-10 RX ORDER — PRAVASTATIN SODIUM 20 MG/1
20 TABLET ORAL DAILY
Qty: 90 TABLET | Refills: 3 | Status: SHIPPED | OUTPATIENT
Start: 2021-11-10 | End: 2022-12-01 | Stop reason: SDUPTHER

## 2021-11-10 RX ORDER — HYDROCHLOROTHIAZIDE 25 MG/1
25 TABLET ORAL DAILY
Qty: 90 TABLET | Refills: 0 | Status: SHIPPED | OUTPATIENT
Start: 2021-11-10 | End: 2022-02-07

## 2021-11-29 ENCOUNTER — PATIENT OUTREACH (OUTPATIENT)
Dept: ADMINISTRATIVE | Facility: HOSPITAL | Age: 66
End: 2021-11-29
Payer: COMMERCIAL

## 2021-12-07 ENCOUNTER — LAB VISIT (OUTPATIENT)
Dept: LAB | Facility: HOSPITAL | Age: 66
End: 2021-12-07
Attending: FAMILY MEDICINE
Payer: COMMERCIAL

## 2021-12-07 DIAGNOSIS — R17 ELEVATED BILIRUBIN: ICD-10-CM

## 2021-12-07 LAB
ALBUMIN SERPL BCP-MCNC: 4 G/DL (ref 3.5–5.2)
ALP SERPL-CCNC: 81 U/L (ref 55–135)
ALT SERPL W/O P-5'-P-CCNC: 14 U/L (ref 10–44)
AST SERPL-CCNC: 32 U/L (ref 10–40)
BILIRUB DIRECT SERPL-MCNC: 0.3 MG/DL (ref 0.1–0.3)
BILIRUB SERPL-MCNC: 0.8 MG/DL (ref 0.1–1)
PROT SERPL-MCNC: 7.6 G/DL (ref 6–8.4)

## 2021-12-07 PROCEDURE — 36415 COLL VENOUS BLD VENIPUNCTURE: CPT | Mod: PO | Performed by: FAMILY MEDICINE

## 2021-12-07 PROCEDURE — 80076 HEPATIC FUNCTION PANEL: CPT | Performed by: FAMILY MEDICINE

## 2021-12-08 ENCOUNTER — PATIENT OUTREACH (OUTPATIENT)
Dept: ADMINISTRATIVE | Facility: HOSPITAL | Age: 66
End: 2021-12-08
Payer: COMMERCIAL

## 2022-02-07 DIAGNOSIS — E11.59 HYPERTENSION ASSOCIATED WITH DIABETES: ICD-10-CM

## 2022-02-07 DIAGNOSIS — I15.2 HYPERTENSION ASSOCIATED WITH DIABETES: ICD-10-CM

## 2022-02-07 RX ORDER — BENAZEPRIL HYDROCHLORIDE 40 MG/1
40 TABLET ORAL DAILY
Qty: 90 TABLET | Refills: 0 | Status: SHIPPED | OUTPATIENT
Start: 2022-02-07 | End: 2022-03-16 | Stop reason: SDUPTHER

## 2022-02-07 RX ORDER — HYDROCHLOROTHIAZIDE 25 MG/1
25 TABLET ORAL DAILY
Qty: 90 TABLET | Refills: 0 | Status: SHIPPED | OUTPATIENT
Start: 2022-02-07 | End: 2022-03-16 | Stop reason: SDUPTHER

## 2022-02-07 NOTE — TELEPHONE ENCOUNTER
Care Due:                  Date            Visit Type   Department     Provider  --------------------------------------------------------------------------------                                EP -                              PRIMARY      [facility] FAM[facility]  Last Visit: 11-      CARE ([facility])   MEDICINE       Trino Snyder  Next Visit: None Scheduled  None         None Found                                                            Last  Test          Frequency    Reason                     Performed    Due Date  --------------------------------------------------------------------------------    HBA1C.......  6 months...  pioglitazone.............  11- 05-    Powered by [employer]. Reference number: [id number].   2/07/2022 8:20:46 AM CST

## 2022-02-25 ENCOUNTER — CLINICAL SUPPORT (OUTPATIENT)
Dept: FAMILY MEDICINE | Facility: CLINIC | Age: 67
End: 2022-02-25
Payer: COMMERCIAL

## 2022-02-25 VITALS — HEART RATE: 49 BPM | SYSTOLIC BLOOD PRESSURE: 153 MMHG | DIASTOLIC BLOOD PRESSURE: 63 MMHG

## 2022-02-25 DIAGNOSIS — Z01.30 BLOOD PRESSURE CHECK: Primary | ICD-10-CM

## 2022-02-25 PROCEDURE — 99999 PR PBB SHADOW E&M-EST. PATIENT-LVL II: CPT | Mod: PBBFAC,,,

## 2022-02-25 PROCEDURE — 99999 PR PBB SHADOW E&M-EST. PATIENT-LVL II: ICD-10-PCS | Mod: PBBFAC,,,

## 2022-03-01 RX ORDER — AMLODIPINE BESYLATE 5 MG/1
5 TABLET ORAL DAILY
Qty: 30 TABLET | Refills: 1 | Status: SHIPPED | OUTPATIENT
Start: 2022-03-01 | End: 2022-03-16 | Stop reason: SDUPTHER

## 2022-03-01 NOTE — PROGRESS NOTES
Blood pressure too high.  Have him start amlodipine 5 mg daily and follow-up appointment with me in 2-3 weeks.  Prescription sent.  Continue other current medications.

## 2022-03-07 ENCOUNTER — CLINICAL SUPPORT (OUTPATIENT)
Dept: FAMILY MEDICINE | Facility: CLINIC | Age: 67
End: 2022-03-07
Payer: COMMERCIAL

## 2022-03-07 VITALS — HEART RATE: 54 BPM | SYSTOLIC BLOOD PRESSURE: 154 MMHG | DIASTOLIC BLOOD PRESSURE: 76 MMHG

## 2022-03-07 DIAGNOSIS — I10 HYPERTENSION, UNSPECIFIED TYPE: Primary | ICD-10-CM

## 2022-03-07 PROCEDURE — 99999 PR PBB SHADOW E&M-EST. PATIENT-LVL III: CPT | Mod: PBBFAC,,,

## 2022-03-07 PROCEDURE — 99999 PR PBB SHADOW E&M-EST. PATIENT-LVL III: ICD-10-PCS | Mod: PBBFAC,,,

## 2022-03-16 ENCOUNTER — OFFICE VISIT (OUTPATIENT)
Dept: FAMILY MEDICINE | Facility: CLINIC | Age: 67
End: 2022-03-16
Payer: COMMERCIAL

## 2022-03-16 VITALS
HEART RATE: 55 BPM | DIASTOLIC BLOOD PRESSURE: 71 MMHG | WEIGHT: 175.69 LBS | BODY MASS INDEX: 23.8 KG/M2 | TEMPERATURE: 98 F | SYSTOLIC BLOOD PRESSURE: 130 MMHG | HEIGHT: 72 IN

## 2022-03-16 DIAGNOSIS — E11.59 HYPERTENSION ASSOCIATED WITH DIABETES: Primary | ICD-10-CM

## 2022-03-16 DIAGNOSIS — I15.2 HYPERTENSION ASSOCIATED WITH DIABETES: Primary | ICD-10-CM

## 2022-03-16 PROCEDURE — 3072F PR LOW RISK FOR RETINOPATHY: ICD-10-PCS | Mod: CPTII,S$GLB,, | Performed by: FAMILY MEDICINE

## 2022-03-16 PROCEDURE — 3072F LOW RISK FOR RETINOPATHY: CPT | Mod: CPTII,S$GLB,, | Performed by: FAMILY MEDICINE

## 2022-03-16 PROCEDURE — 1101F PR PT FALLS ASSESS DOC 0-1 FALLS W/OUT INJ PAST YR: ICD-10-PCS | Mod: CPTII,S$GLB,, | Performed by: FAMILY MEDICINE

## 2022-03-16 PROCEDURE — 4010F PR ACE/ARB THEARPY RXD/TAKEN: ICD-10-PCS | Mod: CPTII,S$GLB,, | Performed by: FAMILY MEDICINE

## 2022-03-16 PROCEDURE — 3008F BODY MASS INDEX DOCD: CPT | Mod: CPTII,S$GLB,, | Performed by: FAMILY MEDICINE

## 2022-03-16 PROCEDURE — 90732 PPSV23 VACC 2 YRS+ SUBQ/IM: CPT | Mod: S$GLB,,, | Performed by: FAMILY MEDICINE

## 2022-03-16 PROCEDURE — 4010F ACE/ARB THERAPY RXD/TAKEN: CPT | Mod: CPTII,S$GLB,, | Performed by: FAMILY MEDICINE

## 2022-03-16 PROCEDURE — 99213 PR OFFICE/OUTPT VISIT, EST, LEVL III, 20-29 MIN: ICD-10-PCS | Mod: 25,S$GLB,, | Performed by: FAMILY MEDICINE

## 2022-03-16 PROCEDURE — 1126F AMNT PAIN NOTED NONE PRSNT: CPT | Mod: CPTII,S$GLB,, | Performed by: FAMILY MEDICINE

## 2022-03-16 PROCEDURE — 99213 OFFICE O/P EST LOW 20 MIN: CPT | Mod: 25,S$GLB,, | Performed by: FAMILY MEDICINE

## 2022-03-16 PROCEDURE — 1159F MED LIST DOCD IN RCRD: CPT | Mod: CPTII,S$GLB,, | Performed by: FAMILY MEDICINE

## 2022-03-16 PROCEDURE — 90732 PNEUMOCOCCAL POLYSACCHARIDE VACCINE 23-VALENT =>2YO SQ IM: ICD-10-PCS | Mod: S$GLB,,, | Performed by: FAMILY MEDICINE

## 2022-03-16 PROCEDURE — 1126F PR PAIN SEVERITY QUANTIFIED, NO PAIN PRESENT: ICD-10-PCS | Mod: CPTII,S$GLB,, | Performed by: FAMILY MEDICINE

## 2022-03-16 PROCEDURE — 99999 PR PBB SHADOW E&M-EST. PATIENT-LVL IV: ICD-10-PCS | Mod: PBBFAC,,, | Performed by: FAMILY MEDICINE

## 2022-03-16 PROCEDURE — 3078F PR MOST RECENT DIASTOLIC BLOOD PRESSURE < 80 MM HG: ICD-10-PCS | Mod: CPTII,S$GLB,, | Performed by: FAMILY MEDICINE

## 2022-03-16 PROCEDURE — 90471 IMMUNIZATION ADMIN: CPT | Mod: S$GLB,,, | Performed by: FAMILY MEDICINE

## 2022-03-16 PROCEDURE — 1159F PR MEDICATION LIST DOCUMENTED IN MEDICAL RECORD: ICD-10-PCS | Mod: CPTII,S$GLB,, | Performed by: FAMILY MEDICINE

## 2022-03-16 PROCEDURE — 1101F PT FALLS ASSESS-DOCD LE1/YR: CPT | Mod: CPTII,S$GLB,, | Performed by: FAMILY MEDICINE

## 2022-03-16 PROCEDURE — 3288F PR FALLS RISK ASSESSMENT DOCUMENTED: ICD-10-PCS | Mod: CPTII,S$GLB,, | Performed by: FAMILY MEDICINE

## 2022-03-16 PROCEDURE — 90471 PNEUMOCOCCAL POLYSACCHARIDE VACCINE 23-VALENT =>2YO SQ IM: ICD-10-PCS | Mod: S$GLB,,, | Performed by: FAMILY MEDICINE

## 2022-03-16 PROCEDURE — 99999 PR PBB SHADOW E&M-EST. PATIENT-LVL IV: CPT | Mod: PBBFAC,,, | Performed by: FAMILY MEDICINE

## 2022-03-16 PROCEDURE — 3288F FALL RISK ASSESSMENT DOCD: CPT | Mod: CPTII,S$GLB,, | Performed by: FAMILY MEDICINE

## 2022-03-16 PROCEDURE — 3008F PR BODY MASS INDEX (BMI) DOCUMENTED: ICD-10-PCS | Mod: CPTII,S$GLB,, | Performed by: FAMILY MEDICINE

## 2022-03-16 PROCEDURE — 3078F DIAST BP <80 MM HG: CPT | Mod: CPTII,S$GLB,, | Performed by: FAMILY MEDICINE

## 2022-03-16 PROCEDURE — 3075F PR MOST RECENT SYSTOLIC BLOOD PRESS GE 130-139MM HG: ICD-10-PCS | Mod: CPTII,S$GLB,, | Performed by: FAMILY MEDICINE

## 2022-03-16 PROCEDURE — 3075F SYST BP GE 130 - 139MM HG: CPT | Mod: CPTII,S$GLB,, | Performed by: FAMILY MEDICINE

## 2022-03-16 RX ORDER — AMLODIPINE BESYLATE 5 MG/1
5 TABLET ORAL DAILY
Qty: 90 TABLET | Refills: 1 | Status: SHIPPED | OUTPATIENT
Start: 2022-03-16 | End: 2022-12-01 | Stop reason: SDUPTHER

## 2022-03-16 RX ORDER — HYDROCHLOROTHIAZIDE 25 MG/1
25 TABLET ORAL DAILY
Qty: 90 TABLET | Refills: 1 | Status: SHIPPED | OUTPATIENT
Start: 2022-03-16 | End: 2022-12-01 | Stop reason: SDUPTHER

## 2022-03-16 RX ORDER — BENAZEPRIL HYDROCHLORIDE 40 MG/1
40 TABLET ORAL DAILY
Qty: 90 TABLET | Refills: 1 | Status: SHIPPED | OUTPATIENT
Start: 2022-03-16 | End: 2022-12-01 | Stop reason: SDUPTHER

## 2022-03-17 NOTE — PROGRESS NOTES
Follow-up hypertension.  Blood pressure better.  Compliant medication.  Diabetes controlled.      Misha was seen today for follow-up.    Diagnoses and all orders for this visit:    Hypertension associated with diabetes  -     benazepriL (LOTENSIN) 40 MG tablet; Take 1 tablet (40 mg total) by mouth once daily.    Other orders  -     amLODIPine (NORVASC) 5 MG tablet; Take 1 tablet (5 mg total) by mouth once daily.  -     hydroCHLOROthiazide (HYDRODIURIL) 25 MG tablet; Take 1 tablet (25 mg total) by mouth once daily.  -     Pneumococcal Polysaccharide Vaccine (23 Valent) (SQ/IM)      Recommend COVID vaccination and shingles vaccine.  Follow-up laboratory as already scheduled.          Diabetes Management Status    Statin: Taking  ACE/ARB: Taking    Screening or Prevention Patient's value Goal Complete/Controlled?   HgA1C Testing and Control   Lab Results   Component Value Date    HGBA1C 6.9 (H) 11/05/2021      Annually/Less than 8% Yes   Lipid profile : 11/05/2021 Annually Yes   LDL control Lab Results   Component Value Date    LDLCALC 76.6 11/05/2021    Annually/Less than 100 mg/dl  Yes   Nephropathy screening Lab Results   Component Value Date    LABMICR 13.0 11/05/2021     No results found for: PROTEINUA Annually Yes   Blood pressure BP Readings from Last 1 Encounters:   03/16/22 130/71    Less than 140/90 Yes   Dilated retinal exam : 11/08/2021 Annually Yes   Foot exam   : 11/10/2021 Annually Yes       Past Medical History:  Past Medical History:   Diagnosis Date    Adrenal adenoma 2008    bx benign    Arthritis     Bronchiectasis 5/15/2012    Cataract     COPD, mild 10/2/2017    COVID-19 ruled out by laboratory testing 01/11/2022    [facility] [address] [place]    Diabetes mellitus, type 2     Emphysema     Hemoptysis     Hyperlipidemia     Hyperplastic colon polyp     Hypertension     Lung nodules     small stable on serial ct    Lymphocytosis     reactive    Peptic ulcer disease      Pneumonia     Tobacco abuse 5/15/2012     Past Surgical History:   Procedure Laterality Date    APPENDECTOMY      BONE MARROW BIOPSY      BRONCHOSCOPY      COLONOSCOPY  8/6/2013         KNEE ARTHROSCOPY Left     MULTIPLE TOOTH EXTRACTIONS       Review of patient's allergies indicates:   Allergen Reactions    Aspirin      Other reaction(s): Stomach upset  Other reaction(s): Nausea     Current Outpatient Medications on File Prior to Visit   Medication Sig Dispense Refill    albuterol (PROVENTIL/VENTOLIN HFA) 90 mcg/actuation inhaler Inhale 2 puffs into the lungs every 6 (six) hours as needed.      pioglitazone (ACTOS) 30 MG tablet Take 1 tablet (30 mg total) by mouth once daily. 90 tablet 3    pravastatin (PRAVACHOL) 20 MG tablet Take 1 tablet (20 mg total) by mouth once daily. 90 tablet 3    [DISCONTINUED] amLODIPine (NORVASC) 5 MG tablet Take 1 tablet (5 mg total) by mouth once daily. 30 tablet 1    [DISCONTINUED] benazepriL (LOTENSIN) 40 MG tablet Take 1 tablet (40 mg total) by mouth once daily. 90 tablet 0    [DISCONTINUED] hydroCHLOROthiazide (HYDRODIURIL) 25 MG tablet Take 1 tablet (25 mg total) by mouth once daily. 90 tablet 0    ALBUTEROL INHL Inhale into the lungs as needed.       blood sugar diagnostic (BLOOD GLUCOSE TEST) Strp Test glucose 1 x daily (use insurance preferred brand) (Patient not taking: Reported on 3/16/2022) 50 strip 11    blood-glucose meter Misc Use as directed (Patient not taking: Reported on 3/16/2022) 1 each prn    lancets Misc As directed (Patient not taking: Reported on 3/16/2022) 100 each prn    VENTOLIN HFA 90 mcg/actuation inhaler INHALE TWO PUFFS EVERY 4 TO 6 HOURS AS NEEDED 18 g 2    [DISCONTINUED] fluorometholone 0.1% (FML) 0.1 % DrpS INSTILL ONE DROP into the right eye FOUR TIMES DAILY FOR 10 DAYS       No current facility-administered medications on file prior to visit.     Social History     Socioeconomic History    Marital status:    Tobacco  Use    Smoking status: Current Some Day Smoker     Packs/day: 0.25     Years: 53.00     Pack years: 13.25     Types: Cigarettes     Last attempt to quit: 12/11/2018     Years since quitting: 3.2    Smokeless tobacco: Never Used    Tobacco comment: 1 pack every 3 days   Substance and Sexual Activity    Alcohol use: No     Alcohol/week: 1.0 standard drink     Types: 1 Cans of beer per week     Comment: 1 beer every 2 weeks    Drug use: No     Family History   Problem Relation Age of Onset    Kidney failure Father     Diabetes Father     Heart disease Mother 60    Diabetes Brother     Cancer Brother         prostate    Glaucoma Brother         ?    Cancer Brother     Amblyopia Neg Hx     Blindness Neg Hx     Cataracts Neg Hx     Hypertension Neg Hx     Macular degeneration Neg Hx     Retinal detachment Neg Hx     Strabismus Neg Hx     Stroke Neg Hx     Thyroid disease Neg Hx            ROS:  GENERAL: No fever, chills,  or significant weight changes.   CARDIOVASCULAR: Denies chest pain, PND, orthopnea or reduced exercise tolerance.  ABDOMEN: Appetite fine. Denies diarrhea, abdominal pain, hematemesis or blood in stool.  URINARY: No flank pain, dysuria or hematuria.    Vitals:    03/16/22 1540   BP: 130/71   Pulse: (!) 55   Temp: 97.9 °F (36.6 °C)   TempSrc: Temporal   Weight: 79.7 kg (175 lb 11.2 oz)   Height: 6' (1.829 m)       Wt Readings from Last 3 Encounters:   03/16/22 79.7 kg (175 lb 11.2 oz)   11/10/21 80.3 kg (177 lb)   06/22/20 81.9 kg (180 lb 9.6 oz)       APPEARANCE: Well nourished, well developed, in no acute distress.    HEAD: Normocephalic.  Atraumatic.  EYES:   Right eye: Pupil reactive.  Conjunctiva clear.    Left eye: Pupil reactive.  Conjunctiva clear.    NECK: Supple. No bruits.  No JVD.  No cervical lymphadenopathy.  No thyromegaly.    CHEST: Breath sounds clear bilaterally.  Normal respiratory effort  CARDIOVASCULAR: Normal rate.  Regular rhythm.  No murmurs.  No rub.  No  gallops.   No edema.  MENTAL STATUS: Alert.  Oriented x 3.

## 2022-08-16 ENCOUNTER — OFFICE VISIT (OUTPATIENT)
Dept: OPTOMETRY | Facility: CLINIC | Age: 67
End: 2022-08-16
Payer: COMMERCIAL

## 2022-08-16 DIAGNOSIS — H40.013 OAG (OPEN ANGLE GLAUCOMA) SUSPECT, LOW RISK, BILATERAL: ICD-10-CM

## 2022-08-16 DIAGNOSIS — H43.393 VITREOUS FLOATERS, BILATERAL: ICD-10-CM

## 2022-08-16 DIAGNOSIS — E11.36 CATARACT ASSOCIATED WITH TYPE 2 DIABETES MELLITUS: ICD-10-CM

## 2022-08-16 DIAGNOSIS — E11.9 DIABETES MELLITUS TYPE 2 WITHOUT RETINOPATHY: Primary | ICD-10-CM

## 2022-08-16 PROCEDURE — 1101F PR PT FALLS ASSESS DOC 0-1 FALLS W/OUT INJ PAST YR: ICD-10-PCS | Mod: CPTII,S$GLB,, | Performed by: OPTOMETRIST

## 2022-08-16 PROCEDURE — 99999 PR PBB SHADOW E&M-EST. PATIENT-LVL III: ICD-10-PCS | Mod: PBBFAC,,, | Performed by: OPTOMETRIST

## 2022-08-16 PROCEDURE — 2023F PR DILATED RETINAL EXAM W/O EVID OF RETINOPATHY: ICD-10-PCS | Mod: CPTII,S$GLB,, | Performed by: OPTOMETRIST

## 2022-08-16 PROCEDURE — 99999 PR PBB SHADOW E&M-EST. PATIENT-LVL III: CPT | Mod: PBBFAC,,, | Performed by: OPTOMETRIST

## 2022-08-16 PROCEDURE — 92014 PR EYE EXAM, EST PATIENT,COMPREHESV: ICD-10-PCS | Mod: S$GLB,,, | Performed by: OPTOMETRIST

## 2022-08-16 PROCEDURE — 3288F PR FALLS RISK ASSESSMENT DOCUMENTED: ICD-10-PCS | Mod: CPTII,S$GLB,, | Performed by: OPTOMETRIST

## 2022-08-16 PROCEDURE — 1159F MED LIST DOCD IN RCRD: CPT | Mod: CPTII,S$GLB,, | Performed by: OPTOMETRIST

## 2022-08-16 PROCEDURE — 1159F PR MEDICATION LIST DOCUMENTED IN MEDICAL RECORD: ICD-10-PCS | Mod: CPTII,S$GLB,, | Performed by: OPTOMETRIST

## 2022-08-16 PROCEDURE — 3288F FALL RISK ASSESSMENT DOCD: CPT | Mod: CPTII,S$GLB,, | Performed by: OPTOMETRIST

## 2022-08-16 PROCEDURE — 92014 COMPRE OPH EXAM EST PT 1/>: CPT | Mod: S$GLB,,, | Performed by: OPTOMETRIST

## 2022-08-16 PROCEDURE — 1160F RVW MEDS BY RX/DR IN RCRD: CPT | Mod: CPTII,S$GLB,, | Performed by: OPTOMETRIST

## 2022-08-16 PROCEDURE — 4010F ACE/ARB THERAPY RXD/TAKEN: CPT | Mod: CPTII,S$GLB,, | Performed by: OPTOMETRIST

## 2022-08-16 PROCEDURE — 1101F PT FALLS ASSESS-DOCD LE1/YR: CPT | Mod: CPTII,S$GLB,, | Performed by: OPTOMETRIST

## 2022-08-16 PROCEDURE — 2023F DILAT RTA XM W/O RTNOPTHY: CPT | Mod: CPTII,S$GLB,, | Performed by: OPTOMETRIST

## 2022-08-16 PROCEDURE — 1126F PR PAIN SEVERITY QUANTIFIED, NO PAIN PRESENT: ICD-10-PCS | Mod: CPTII,S$GLB,, | Performed by: OPTOMETRIST

## 2022-08-16 PROCEDURE — 1160F PR REVIEW ALL MEDS BY PRESCRIBER/CLIN PHARMACIST DOCUMENTED: ICD-10-PCS | Mod: CPTII,S$GLB,, | Performed by: OPTOMETRIST

## 2022-08-16 PROCEDURE — 4010F PR ACE/ARB THEARPY RXD/TAKEN: ICD-10-PCS | Mod: CPTII,S$GLB,, | Performed by: OPTOMETRIST

## 2022-08-16 PROCEDURE — 1126F AMNT PAIN NOTED NONE PRSNT: CPT | Mod: CPTII,S$GLB,, | Performed by: OPTOMETRIST

## 2022-08-16 NOTE — LETTER
2022    Misha Simpson  [address]  [facility] [address]             [place] - [facility]  [address]  [facility] [address]  Phone: [phone]  Fax: [phone] Re: Misha Simpson     : 1955     To Whom It May Concern:    Mr. Simpson's uncorrected vision is:  Visual Acuity     Visual Acuity (Snellen - Linear)       Right Left    Dist sc 20/20 -1 20/20 -2    Near sc J3 J3              No diabetic eye disease is present. No other eye issues are present at this time. I'll recheck his eye and vision health in 1 year.     Please feel free to contact me if you have any further questions or concerns.     Sincerely,      ZONIA Kent, OD

## 2022-08-16 NOTE — PATIENT INSTRUCTIONS
"DRY EYES -- BURNING OR BEBO SYMPTOMS:  Use Over The Counter artificial tears as needed for dry eye symptoms.   Some common brands include:  Systane, Optive, Refresh, and Thera-Tears.  These drops can be used as frequently as desired, but may be most helpful use during long periods of concentrated work.  For example, reading / working at the computer. Start with 3-4x per day.     Nighttime Ophthalmic gel or ointments are available: Refresh PM, Genteal, and Lacrilube.    Avoid drops that "get redness out" (Visine, Murine, Clear Eyes), as these may contain medication that could further irritate the eyes, especially with chronic use.    ALLERGY EYES -- ITCHING SYMPTOMS:  Over the counter medications include--Pataday, Zaditor, and Alaway.  Use as directed 1-2 drops daily for symptoms of itching / watering eyes.  These drops will not help for dry eye or exposure symptoms.    REDNESS RELIEF:  Lumify---is a good redness reliever that will not cause irritation if used chronically.        DIABETES AND THE EYE / DIABETIC RETINOPATHY    Diabetic retinopathy is a condition occurring in persons with diabetes, which causes progressive damage to the retina, the light sensitive lining at the back of the eye. It is a serious sight-threatening complication of diabetes.    Diabetic retinopathy is the result of damage to the tiny blood vessels that nourish the retina. They leak blood and other fluids that cause swelling of retinal tissue and clouding of vision. The condition usually affects both eyes. The longer a person has diabetes, the more likely they will develop diabetic retinopathy. If left untreated, diabetic retinopathy can cause blindness.  There are two basic types of diabetic retinopathy:    Background or nonproliferative diabetic retinopathy (NPDR)  Nonproliferative diabetic retinopathy (NPDR) is the earliest stage of diabetic retinopathy. With this condition, damaged blood vessels in the retina begin to leak extra fluid " and small amounts of blood into the eye. Sometimes, deposits of cholesterol or other fats from the blood may leak into the retina. Many people with diabetes have mild NPDR, which usually does not affect their vision. However, if their vision is affected, it is the result of macular edema and macular ischemia.    If vision is affected due to macular changes, a consult with a Retina Specialist may be advised.  This is an ophthalmologist that treats retina conditions, including diabetic retinopathy.     Proliferative diabetic retinopathy (PDR)  Proliferative diabetic retinopathy (PDR) mainly occurs when many of the blood vessels in the retina close, preventing enough blood flow. In an attempt to supply blood to the area where the original vessels closed, the retina responds by growing new blood vessels. This is called neovascularization. However, these new blood vessels are abnormal and do not supply the retina with proper blood flow. The new vessels are also often accompanied by scar tissue that may cause the retina to wrinkle or detach. PDR may cause more severe vision loss than NPDR because it can affect both central and peripheral vision.     A patient diagnosed with proliferative diabetic eye disease will be referred to a retinal specialist for consultation.    Often there are no visual symptoms in the early stages of diabetic retinopathy. That is why our eye care professionals recommend that everyone with diabetes have a comprehensive dilated eye examination once a year. Early detection and treatment can limit the potential for significant vision loss from diabetic retinopathy.       Early Cataracts--not visually significant for surgery consultation.    What Are Cataracts?  A clear lens in the eye focuses light. This lets the eye see images sharply. With age, the lens slowly becomes cloudy. The cloudy lens is a cataract. A cataract scatters light and makes it hard for the eye to focus. Cataracts often form in  both eyes. But one lens may cloud faster than the other.      The Aging of Your Lens    Your lens may cloud so slowly that you don`t notice any vision changes at first. But as the cataract gets worse, the eye has a harder time focusing. In early stages, glasses may help you see better. As the lens gets cloudier, your doctor may recommend surgery to restore your vision.

## 2022-08-16 NOTE — PROGRESS NOTES
HPI     Routine-dle-11/21    Pt states he needed eyes checked for work. Denies any changes. Wears   reading glasses. No flashes or floaters. BSL controlled.    Hemoglobin A1C       Date                     Value               Ref Range             Status                11/05/2021               6.9 (H)             4.0 - 5.6 %           Final              Comment:    ADA Screening Guidelines:  5.7-6.4%  Consistent with   prediabetes  >or=6.5%  Consistent with diabetes    High levels of fetal   hemoglobin interfere with the HbA1C  assay. Heterozygous hemoglobin   variants (HbS, HgC, etc)do  not significantly interfere with this assay.     However, presence of multiple variants may affect accuracy.         02/03/2021               6.8 (H)             4.0 - 5.6 %           Final              Comment:    ADA Screening Guidelines:  5.7-6.4%  Consistent with   prediabetes  >or=6.5%  Consistent with diabetes    High levels of fetal   hemoglobin interfere with the HbA1C  assay. Heterozygous hemoglobin   variants (HbS, HgC, etc)do  not significantly interfere with this assay.     However, presence of multiple variants may affect accuracy.         08/06/2020               7.0 (H)             4.0 - 5.6 %           Final              Comment:    ADA Screening Guidelines:  5.7-6.4%  Consistent with   prediabetes  >or=6.5%  Consistent with diabetes  High levels of fetal   hemoglobin interfere with the HbA1C  assay. Heterozygous hemoglobin   variants (HbS, HgC, etc)do  not significantly interfere with this assay.     However, presence of multiple variants may affect accuracy.    ----------      Last edited by Lisa Vasquez on 8/16/2022  3:58 PM. (History)        ROS     Positive for: Eyes    Negative for: Constitutional, Gastrointestinal, Neurological, Skin,   Genitourinary, Musculoskeletal, HENT, Endocrine, Cardiovascular,   Respiratory, Psychiatric, Allergic/Imm, Heme/Lymph    Last edited by ZONIA Kent, OD on 8/16/2022   4:29 PM. (History)        Assessment /Plan     For exam results, see Encounter Report.    Diabetes mellitus type 2 without retinopathy    Cataract associated with type 2 diabetes mellitus    Vitreous floaters, bilateral    OAG (open angle glaucoma) suspect, low risk, bilateral      1. No matty/ retinopathy, no csme, gave Diabetic Retinopathy info, control glucose and BP  Advise annual DFE  2. Vis sig NS, not ready for consult   Gave info, cautions night driving, CE possible 2-3 yrs  3. RD precautions given and reviewed. Patient knows to call/ message if any further changes in symptoms occur.  4.   Large c/d suspect w/ remote fhx  Angles open  Low IOP     Thin  /483    OCT 11/2021  G 97 wnl  G 94 wnl  No progression\     Monitor / continue no tx     Ok continue with otc only   No new refraction    Letter w/ vision and health status given     Discussed and educated patient on current findings /plan.  RTC 1 year, prn if any changes / issues

## 2022-10-21 ENCOUNTER — TELEPHONE (OUTPATIENT)
Dept: FAMILY MEDICINE | Facility: CLINIC | Age: 67
End: 2022-10-21
Payer: COMMERCIAL
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
## 2022-10-24 ENCOUNTER — TELEPHONE (OUTPATIENT)
Dept: PODIATRY | Facility: CLINIC | Age: 67
End: 2022-10-24
Payer: COMMERCIAL

## 2022-10-24 ENCOUNTER — OFFICE VISIT (OUTPATIENT)
Dept: FAMILY MEDICINE | Facility: CLINIC | Age: 67
End: 2022-10-24
Payer: COMMERCIAL

## 2022-10-24 VITALS
WEIGHT: 171.88 LBS | BODY MASS INDEX: 23.28 KG/M2 | HEART RATE: 64 BPM | DIASTOLIC BLOOD PRESSURE: 87 MMHG | SYSTOLIC BLOOD PRESSURE: 141 MMHG | HEIGHT: 72 IN | TEMPERATURE: 98 F

## 2022-10-24 DIAGNOSIS — S02.2XXD CLOSED FRACTURE OF NASAL SEPTUM WITH ROUTINE HEALING, SUBSEQUENT ENCOUNTER: ICD-10-CM

## 2022-10-24 DIAGNOSIS — S92.324A CLOSED NONDISPLACED FRACTURE OF SECOND METATARSAL BONE OF RIGHT FOOT, INITIAL ENCOUNTER: ICD-10-CM

## 2022-10-24 DIAGNOSIS — S01.21XD LACERATION OF NOSE, SUBSEQUENT ENCOUNTER: ICD-10-CM

## 2022-10-24 DIAGNOSIS — S92.334A CLOSED NONDISPLACED FRACTURE OF THIRD METATARSAL BONE OF RIGHT FOOT, INITIAL ENCOUNTER: ICD-10-CM

## 2022-10-24 DIAGNOSIS — S80.211D ABRASION OF RIGHT KNEE, SUBSEQUENT ENCOUNTER: ICD-10-CM

## 2022-10-24 DIAGNOSIS — S02.85XD CLOSED FRACTURE OF LEFT ORBIT WITH ROUTINE HEALING, SUBSEQUENT ENCOUNTER: ICD-10-CM

## 2022-10-24 DIAGNOSIS — S02.2XXD CLOSED FRACTURE OF NASAL BONE WITH ROUTINE HEALING, SUBSEQUENT ENCOUNTER: Primary | ICD-10-CM
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
## 2022-10-24 PROCEDURE — 3072F LOW RISK FOR RETINOPATHY: CPT | Mod: CPTII,S$GLB,, | Performed by: FAMILY MEDICINE

## 2022-10-24 PROCEDURE — 90471 IMMUNIZATION ADMIN: CPT | Mod: S$GLB,,, | Performed by: FAMILY MEDICINE

## 2022-10-24 PROCEDURE — 1101F PR PT FALLS ASSESS DOC 0-1 FALLS W/OUT INJ PAST YR: ICD-10-PCS | Mod: CPTII,S$GLB,, | Performed by: FAMILY MEDICINE

## 2022-10-24 PROCEDURE — 1125F AMNT PAIN NOTED PAIN PRSNT: CPT | Mod: CPTII,S$GLB,, | Performed by: FAMILY MEDICINE

## 2022-10-24 PROCEDURE — 3288F FALL RISK ASSESSMENT DOCD: CPT | Mod: CPTII,S$GLB,, | Performed by: FAMILY MEDICINE

## 2022-10-24 PROCEDURE — 1125F PR PAIN SEVERITY QUANTIFIED, PAIN PRESENT: ICD-10-PCS | Mod: CPTII,S$GLB,, | Performed by: FAMILY MEDICINE

## 2022-10-24 PROCEDURE — 99214 PR OFFICE/OUTPT VISIT, EST, LEVL IV, 30-39 MIN: ICD-10-PCS | Mod: 25,S$GLB,, | Performed by: FAMILY MEDICINE

## 2022-10-24 PROCEDURE — 99214 OFFICE O/P EST MOD 30 MIN: CPT | Mod: 25,S$GLB,, | Performed by: FAMILY MEDICINE

## 2022-10-24 PROCEDURE — 1159F MED LIST DOCD IN RCRD: CPT | Mod: CPTII,S$GLB,, | Performed by: FAMILY MEDICINE

## 2022-10-24 PROCEDURE — 99999 PR PBB SHADOW E&M-EST. PATIENT-LVL V: ICD-10-PCS | Mod: PBBFAC,,, | Performed by: FAMILY MEDICINE

## 2022-10-24 PROCEDURE — 4010F PR ACE/ARB THEARPY RXD/TAKEN: ICD-10-PCS | Mod: CPTII,S$GLB,, | Performed by: FAMILY MEDICINE

## 2022-10-24 PROCEDURE — 90471 FLU VACCINE - QUADRIVALENT - ADJUVANTED: ICD-10-PCS | Mod: S$GLB,,, | Performed by: FAMILY MEDICINE

## 2022-10-24 PROCEDURE — 1101F PT FALLS ASSESS-DOCD LE1/YR: CPT | Mod: CPTII,S$GLB,, | Performed by: FAMILY MEDICINE

## 2022-10-24 PROCEDURE — 99999 PR PBB SHADOW E&M-EST. PATIENT-LVL V: CPT | Mod: PBBFAC,,, | Performed by: FAMILY MEDICINE

## 2022-10-24 PROCEDURE — 3288F PR FALLS RISK ASSESSMENT DOCUMENTED: ICD-10-PCS | Mod: CPTII,S$GLB,, | Performed by: FAMILY MEDICINE

## 2022-10-24 PROCEDURE — 3072F PR LOW RISK FOR RETINOPATHY: ICD-10-PCS | Mod: CPTII,S$GLB,, | Performed by: FAMILY MEDICINE

## 2022-10-24 PROCEDURE — 4010F ACE/ARB THERAPY RXD/TAKEN: CPT | Mod: CPTII,S$GLB,, | Performed by: FAMILY MEDICINE

## 2022-10-24 PROCEDURE — 1159F PR MEDICATION LIST DOCUMENTED IN MEDICAL RECORD: ICD-10-PCS | Mod: CPTII,S$GLB,, | Performed by: FAMILY MEDICINE

## 2022-10-24 PROCEDURE — 90694 VACC AIIV4 NO PRSRV 0.5ML IM: CPT | Mod: S$GLB,,, | Performed by: FAMILY MEDICINE

## 2022-10-24 PROCEDURE — 90694 FLU VACCINE - QUADRIVALENT - ADJUVANTED: ICD-10-PCS | Mod: S$GLB,,, | Performed by: FAMILY MEDICINE

## 2022-10-24 RX ORDER — HYDROCODONE BITARTRATE AND ACETAMINOPHEN 5; 325 MG/1; MG/1
1 TABLET ORAL EVERY 6 HOURS PRN
COMMUNITY
Start: 2022-10-20 | End: 2023-04-11
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
## 2022-10-24 NOTE — PATIENT INSTRUCTIONS
Podiatry staff will contact you to schedule appt, once they do, let me know when it is so we can schedule the BP check.  Thank you

## 2022-10-25 ENCOUNTER — HOSPITAL ENCOUNTER (OUTPATIENT)
Dept: RADIOLOGY | Facility: HOSPITAL | Age: 67
Discharge: HOME | End: 2022-10-25
Attending: STUDENT IN AN ORGANIZED HEALTH CARE EDUCATION/TRAINING PROGRAM
Payer: COMMERCIAL

## 2022-10-25 ENCOUNTER — OFFICE VISIT (OUTPATIENT)
Dept: PODIATRY | Facility: CLINIC | Age: 67
End: 2022-10-25
Payer: COMMERCIAL

## 2022-10-25 VITALS
DIASTOLIC BLOOD PRESSURE: 87 MMHG | HEART RATE: 64 BPM | HEIGHT: 72 IN | WEIGHT: 171 LBS | SYSTOLIC BLOOD PRESSURE: 141 MMHG | BODY MASS INDEX: 23.16 KG/M2
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
## 2022-10-25 DIAGNOSIS — S92.901A CLOSED FRACTURE OF RIGHT FOOT, INITIAL ENCOUNTER: ICD-10-CM

## 2022-10-25 DIAGNOSIS — S92.334A CLOSED NONDISPLACED FRACTURE OF THIRD METATARSAL BONE OF RIGHT FOOT, INITIAL ENCOUNTER: ICD-10-CM

## 2022-10-25 DIAGNOSIS — S92.324A CLOSED NONDISPLACED FRACTURE OF SECOND METATARSAL BONE OF RIGHT FOOT, INITIAL ENCOUNTER: ICD-10-CM

## 2022-10-25 PROCEDURE — 73630 X-RAY EXAM OF FOOT: CPT | Mod: 26,RT,, | Performed by: RADIOLOGY

## 2022-10-25 PROCEDURE — 3072F LOW RISK FOR RETINOPATHY: CPT | Mod: CPTII,S$GLB,, | Performed by: STUDENT IN AN ORGANIZED HEALTH CARE EDUCATION/TRAINING PROGRAM

## 2022-10-25 PROCEDURE — 1101F PT FALLS ASSESS-DOCD LE1/YR: CPT | Mod: CPTII,S$GLB,, | Performed by: STUDENT IN AN ORGANIZED HEALTH CARE EDUCATION/TRAINING PROGRAM

## 2022-10-25 PROCEDURE — 99203 OFFICE O/P NEW LOW 30 MIN: CPT | Mod: S$GLB,,, | Performed by: STUDENT IN AN ORGANIZED HEALTH CARE EDUCATION/TRAINING PROGRAM

## 2022-10-25 PROCEDURE — 4010F ACE/ARB THERAPY RXD/TAKEN: CPT | Mod: CPTII,S$GLB,, | Performed by: STUDENT IN AN ORGANIZED HEALTH CARE EDUCATION/TRAINING PROGRAM

## 2022-10-25 PROCEDURE — 3079F DIAST BP 80-89 MM HG: CPT | Mod: CPTII,S$GLB,, | Performed by: STUDENT IN AN ORGANIZED HEALTH CARE EDUCATION/TRAINING PROGRAM

## 2022-10-25 PROCEDURE — 1101F PR PT FALLS ASSESS DOC 0-1 FALLS W/OUT INJ PAST YR: ICD-10-PCS | Mod: CPTII,S$GLB,, | Performed by: STUDENT IN AN ORGANIZED HEALTH CARE EDUCATION/TRAINING PROGRAM

## 2022-10-25 PROCEDURE — 1159F MED LIST DOCD IN RCRD: CPT | Mod: CPTII,S$GLB,, | Performed by: STUDENT IN AN ORGANIZED HEALTH CARE EDUCATION/TRAINING PROGRAM

## 2022-10-25 PROCEDURE — 99999 PR PBB SHADOW E&M-EST. PATIENT-LVL V: ICD-10-PCS | Mod: PBBFAC,,, | Performed by: STUDENT IN AN ORGANIZED HEALTH CARE EDUCATION/TRAINING PROGRAM

## 2022-10-25 PROCEDURE — 3288F FALL RISK ASSESSMENT DOCD: CPT | Mod: CPTII,S$GLB,, | Performed by: STUDENT IN AN ORGANIZED HEALTH CARE EDUCATION/TRAINING PROGRAM

## 2022-10-25 PROCEDURE — 1159F PR MEDICATION LIST DOCUMENTED IN MEDICAL RECORD: ICD-10-PCS | Mod: CPTII,S$GLB,, | Performed by: STUDENT IN AN ORGANIZED HEALTH CARE EDUCATION/TRAINING PROGRAM

## 2022-10-25 PROCEDURE — 1125F AMNT PAIN NOTED PAIN PRSNT: CPT | Mod: CPTII,S$GLB,, | Performed by: STUDENT IN AN ORGANIZED HEALTH CARE EDUCATION/TRAINING PROGRAM

## 2022-10-25 PROCEDURE — 3077F SYST BP >= 140 MM HG: CPT | Mod: CPTII,S$GLB,, | Performed by: STUDENT IN AN ORGANIZED HEALTH CARE EDUCATION/TRAINING PROGRAM

## 2022-10-25 PROCEDURE — 3072F PR LOW RISK FOR RETINOPATHY: ICD-10-PCS | Mod: CPTII,S$GLB,, | Performed by: STUDENT IN AN ORGANIZED HEALTH CARE EDUCATION/TRAINING PROGRAM

## 2022-10-25 PROCEDURE — 99999 PR PBB SHADOW E&M-EST. PATIENT-LVL V: CPT | Mod: PBBFAC,,, | Performed by: STUDENT IN AN ORGANIZED HEALTH CARE EDUCATION/TRAINING PROGRAM

## 2022-10-25 PROCEDURE — 73630 XR FOOT COMPLETE 3 VIEW RIGHT: ICD-10-PCS | Mod: 26,RT,, | Performed by: RADIOLOGY

## 2022-10-25 PROCEDURE — 73630 X-RAY EXAM OF FOOT: CPT | Mod: TC,PO,RT

## 2022-10-25 PROCEDURE — 1125F PR PAIN SEVERITY QUANTIFIED, PAIN PRESENT: ICD-10-PCS | Mod: CPTII,S$GLB,, | Performed by: STUDENT IN AN ORGANIZED HEALTH CARE EDUCATION/TRAINING PROGRAM

## 2022-10-25 PROCEDURE — 3077F PR MOST RECENT SYSTOLIC BLOOD PRESSURE >= 140 MM HG: ICD-10-PCS | Mod: CPTII,S$GLB,, | Performed by: STUDENT IN AN ORGANIZED HEALTH CARE EDUCATION/TRAINING PROGRAM

## 2022-10-25 PROCEDURE — 99203 PR OFFICE/OUTPT VISIT, NEW, LEVL III, 30-44 MIN: ICD-10-PCS | Mod: S$GLB,,, | Performed by: STUDENT IN AN ORGANIZED HEALTH CARE EDUCATION/TRAINING PROGRAM

## 2022-10-25 PROCEDURE — 1160F PR REVIEW ALL MEDS BY PRESCRIBER/CLIN PHARMACIST DOCUMENTED: ICD-10-PCS | Mod: CPTII,S$GLB,, | Performed by: STUDENT IN AN ORGANIZED HEALTH CARE EDUCATION/TRAINING PROGRAM

## 2022-10-25 PROCEDURE — 1160F RVW MEDS BY RX/DR IN RCRD: CPT | Mod: CPTII,S$GLB,, | Performed by: STUDENT IN AN ORGANIZED HEALTH CARE EDUCATION/TRAINING PROGRAM

## 2022-10-25 PROCEDURE — 3079F PR MOST RECENT DIASTOLIC BLOOD PRESSURE 80-89 MM HG: ICD-10-PCS | Mod: CPTII,S$GLB,, | Performed by: STUDENT IN AN ORGANIZED HEALTH CARE EDUCATION/TRAINING PROGRAM

## 2022-10-25 PROCEDURE — 3288F PR FALLS RISK ASSESSMENT DOCUMENTED: ICD-10-PCS | Mod: CPTII,S$GLB,, | Performed by: STUDENT IN AN ORGANIZED HEALTH CARE EDUCATION/TRAINING PROGRAM

## 2022-10-25 PROCEDURE — 4010F PR ACE/ARB THEARPY RXD/TAKEN: ICD-10-PCS | Mod: CPTII,S$GLB,, | Performed by: STUDENT IN AN ORGANIZED HEALTH CARE EDUCATION/TRAINING PROGRAM
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
## 2022-10-28 ENCOUNTER — TELEPHONE (OUTPATIENT)
Dept: PODIATRY | Facility: CLINIC | Age: 67
End: 2022-10-28
Payer: COMMERCIAL

## 2022-10-28 NOTE — TELEPHONE ENCOUNTER
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 She stated the only thing they did not receive was a follow up summary, and on this document they need to make sure that they put physicians name and specialty. If possible please can we get this information and have it emailed to Mrs Ruiz at [email or web address]. She can be reached at [phone]. Thank you.

## 2022-11-01 ENCOUNTER — TELEPHONE (OUTPATIENT)
Dept: PODIATRY | Facility: CLINIC | Age: 67
End: 2022-11-01
Payer: COMMERCIAL

## 2022-11-01 ENCOUNTER — TELEPHONE (OUTPATIENT)
Dept: FAMILY MEDICINE | Facility: CLINIC | Age: 67
End: 2022-11-01
Payer: COMMERCIAL

## 2022-11-01 NOTE — TELEPHONE ENCOUNTER
----- Message from Ismael Armendariz sent at 11/1/2022  9:25 AM CDT -----  Contact: Sara  Patients wife is calling to speak with the office regarding workers comp. Reports patient was seen on  10/10-10/24 and appt was its workers comp. Please give Sara a mak to further discuss if needed at .[phone]

## 2022-11-01 NOTE — TELEPHONE ENCOUNTER
----- Message from Ismael Armendariz sent at 11/1/2022  9:29 AM CDT -----  Contact: Sara/ wife  Patients wife is calling to speak with the office regarding workers comp. Reports patient was seen on  10/10-10/25 and appt was its workers comp and needs paperwork filled and faxed to case manage . Reports having an upcoming appt as well that needs to be changed to workers comp. Please give Sara corado to further discuss at .[phone].       Case # [id number]   ; Jodie weilbicher [phone] ext [phone] fax [phone]
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
## 2022-11-01 NOTE — TELEPHONE ENCOUNTER
Informed Pt's wife that the number given for fax was the wrong number . The Pt's wife provided me with the correct contact information as well as the fax number/

## 2022-11-15 ENCOUNTER — HOSPITAL ENCOUNTER (OUTPATIENT)
Dept: RADIOLOGY | Facility: HOSPITAL | Age: 67
Discharge: HOME | End: 2022-11-15
Attending: STUDENT IN AN ORGANIZED HEALTH CARE EDUCATION/TRAINING PROGRAM
Payer: COMMERCIAL

## 2022-11-15 DIAGNOSIS — S92.334A CLOSED NONDISPLACED FRACTURE OF THIRD METATARSAL BONE OF RIGHT FOOT, INITIAL ENCOUNTER: ICD-10-CM

## 2022-11-15 DIAGNOSIS — S92.324A CLOSED NONDISPLACED FRACTURE OF SECOND METATARSAL BONE OF RIGHT FOOT, INITIAL ENCOUNTER: ICD-10-CM
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
## 2022-11-15 PROCEDURE — 73630 X-RAY EXAM OF FOOT: CPT | Mod: TC,PO,RT

## 2022-11-15 PROCEDURE — 73630 XR FOOT COMPLETE 3 VIEW RIGHT: ICD-10-PCS | Mod: 26,RT,, | Performed by: RADIOLOGY

## 2022-11-15 PROCEDURE — 73630 X-RAY EXAM OF FOOT: CPT | Mod: 26,RT,, | Performed by: RADIOLOGY

## 2022-11-30 ENCOUNTER — PATIENT OUTREACH (OUTPATIENT)
Dept: ADMINISTRATIVE | Facility: HOSPITAL | Age: 67
End: 2022-11-30
Payer: COMMERCIAL

## 2022-11-30 NOTE — PROGRESS NOTES
Working Diabetes Report.    Assisted to schedule annual exam, follow up diabetes, 12/01/22.  Pt will come in fasting for same day labs.  Appt scheduled.

## 2022-12-01 ENCOUNTER — OFFICE VISIT (OUTPATIENT)
Dept: FAMILY MEDICINE | Facility: CLINIC | Age: 67
End: 2022-12-01
Payer: COMMERCIAL

## 2022-12-01 ENCOUNTER — LAB VISIT (OUTPATIENT)
Dept: LAB | Facility: HOSPITAL | Age: 67
End: 2022-12-01
Attending: FAMILY MEDICINE
Payer: COMMERCIAL

## 2022-12-01 VITALS
SYSTOLIC BLOOD PRESSURE: 134 MMHG | HEART RATE: 50 BPM | OXYGEN SATURATION: 98 % | DIASTOLIC BLOOD PRESSURE: 84 MMHG | BODY MASS INDEX: 23.35 KG/M2 | HEIGHT: 72 IN | TEMPERATURE: 97 F | WEIGHT: 172.38 LBS

## 2022-12-01 DIAGNOSIS — F17.200 SMOKING: ICD-10-CM

## 2022-12-01 DIAGNOSIS — E11.59 HYPERTENSION ASSOCIATED WITH DIABETES: ICD-10-CM

## 2022-12-01 DIAGNOSIS — E11.69 COMBINED HYPERLIPIDEMIA ASSOCIATED WITH TYPE 2 DIABETES MELLITUS: ICD-10-CM

## 2022-12-01 DIAGNOSIS — J47.9 BRONCHIECTASIS WITHOUT COMPLICATION: ICD-10-CM

## 2022-12-01 DIAGNOSIS — E11.9 TYPE 2 DIABETES MELLITUS WITHOUT COMPLICATION, WITHOUT LONG-TERM CURRENT USE OF INSULIN: ICD-10-CM

## 2022-12-01 DIAGNOSIS — Z12.5 SCREENING FOR PROSTATE CANCER: ICD-10-CM

## 2022-12-01 DIAGNOSIS — E78.2 COMBINED HYPERLIPIDEMIA ASSOCIATED WITH TYPE 2 DIABETES MELLITUS: ICD-10-CM

## 2022-12-01 DIAGNOSIS — J43.9 PULMONARY EMPHYSEMA, UNSPECIFIED EMPHYSEMA TYPE: ICD-10-CM

## 2022-12-01 DIAGNOSIS — I15.2 HYPERTENSION ASSOCIATED WITH DIABETES: ICD-10-CM

## 2022-12-01 DIAGNOSIS — Z00.00 ROUTINE HISTORY AND PHYSICAL EXAMINATION OF ADULT: ICD-10-CM

## 2022-12-01 DIAGNOSIS — Z00.00 ROUTINE HISTORY AND PHYSICAL EXAMINATION OF ADULT: Primary | ICD-10-CM

## 2022-12-01 PROCEDURE — 99397 PR PREVENTIVE VISIT,EST,65 & OVER: ICD-10-PCS | Mod: S$GLB,,, | Performed by: FAMILY MEDICINE

## 2022-12-01 PROCEDURE — 3008F PR BODY MASS INDEX (BMI) DOCUMENTED: ICD-10-PCS | Mod: CPTII,S$GLB,, | Performed by: FAMILY MEDICINE

## 2022-12-01 PROCEDURE — 3075F PR MOST RECENT SYSTOLIC BLOOD PRESS GE 130-139MM HG: ICD-10-PCS | Mod: CPTII,S$GLB,, | Performed by: FAMILY MEDICINE

## 2022-12-01 PROCEDURE — 1159F PR MEDICATION LIST DOCUMENTED IN MEDICAL RECORD: ICD-10-PCS | Mod: CPTII,S$GLB,, | Performed by: FAMILY MEDICINE

## 2022-12-01 PROCEDURE — 83036 HEMOGLOBIN GLYCOSYLATED A1C: CPT | Performed by: FAMILY MEDICINE

## 2022-12-01 PROCEDURE — 80061 LIPID PANEL: CPT | Performed by: FAMILY MEDICINE

## 2022-12-01 PROCEDURE — 99999 PR PBB SHADOW E&M-EST. PATIENT-LVL IV: CPT | Mod: PBBFAC,,, | Performed by: FAMILY MEDICINE

## 2022-12-01 PROCEDURE — 1101F PR PT FALLS ASSESS DOC 0-1 FALLS W/OUT INJ PAST YR: ICD-10-PCS | Mod: CPTII,S$GLB,, | Performed by: FAMILY MEDICINE

## 2022-12-01 PROCEDURE — 4010F ACE/ARB THERAPY RXD/TAKEN: CPT | Mod: CPTII,S$GLB,, | Performed by: FAMILY MEDICINE

## 2022-12-01 PROCEDURE — 1159F MED LIST DOCD IN RCRD: CPT | Mod: CPTII,S$GLB,, | Performed by: FAMILY MEDICINE

## 2022-12-01 PROCEDURE — 3075F SYST BP GE 130 - 139MM HG: CPT | Mod: CPTII,S$GLB,, | Performed by: FAMILY MEDICINE

## 2022-12-01 PROCEDURE — 99999 PR PBB SHADOW E&M-EST. PATIENT-LVL IV: ICD-10-PCS | Mod: PBBFAC,,, | Performed by: FAMILY MEDICINE

## 2022-12-01 PROCEDURE — 36415 COLL VENOUS BLD VENIPUNCTURE: CPT | Mod: PO | Performed by: FAMILY MEDICINE

## 2022-12-01 PROCEDURE — 80048 BASIC METABOLIC PNL TOTAL CA: CPT | Performed by: FAMILY MEDICINE

## 2022-12-01 PROCEDURE — 3072F LOW RISK FOR RETINOPATHY: CPT | Mod: CPTII,S$GLB,, | Performed by: FAMILY MEDICINE

## 2022-12-01 PROCEDURE — 4010F PR ACE/ARB THEARPY RXD/TAKEN: ICD-10-PCS | Mod: CPTII,S$GLB,, | Performed by: FAMILY MEDICINE

## 2022-12-01 PROCEDURE — 1101F PT FALLS ASSESS-DOCD LE1/YR: CPT | Mod: CPTII,S$GLB,, | Performed by: FAMILY MEDICINE

## 2022-12-01 PROCEDURE — 3288F PR FALLS RISK ASSESSMENT DOCUMENTED: ICD-10-PCS | Mod: CPTII,S$GLB,, | Performed by: FAMILY MEDICINE

## 2022-12-01 PROCEDURE — 3079F PR MOST RECENT DIASTOLIC BLOOD PRESSURE 80-89 MM HG: ICD-10-PCS | Mod: CPTII,S$GLB,, | Performed by: FAMILY MEDICINE

## 2022-12-01 PROCEDURE — 84153 ASSAY OF PSA TOTAL: CPT | Performed by: FAMILY MEDICINE

## 2022-12-01 PROCEDURE — 3288F FALL RISK ASSESSMENT DOCD: CPT | Mod: CPTII,S$GLB,, | Performed by: FAMILY MEDICINE

## 2022-12-01 PROCEDURE — 3079F DIAST BP 80-89 MM HG: CPT | Mod: CPTII,S$GLB,, | Performed by: FAMILY MEDICINE

## 2022-12-01 PROCEDURE — 3008F BODY MASS INDEX DOCD: CPT | Mod: CPTII,S$GLB,, | Performed by: FAMILY MEDICINE

## 2022-12-01 PROCEDURE — 99397 PER PM REEVAL EST PAT 65+ YR: CPT | Mod: S$GLB,,, | Performed by: FAMILY MEDICINE

## 2022-12-01 PROCEDURE — 3072F PR LOW RISK FOR RETINOPATHY: ICD-10-PCS | Mod: CPTII,S$GLB,, | Performed by: FAMILY MEDICINE

## 2022-12-01 RX ORDER — BENAZEPRIL HYDROCHLORIDE 40 MG/1
40 TABLET ORAL DAILY
Qty: 90 TABLET | Refills: 3 | Status: SHIPPED | OUTPATIENT
Start: 2022-12-01 | End: 2024-01-03

## 2022-12-01 RX ORDER — PRAVASTATIN SODIUM 20 MG/1
20 TABLET ORAL DAILY
Qty: 90 TABLET | Refills: 3 | Status: SHIPPED | OUTPATIENT
Start: 2022-12-01 | End: 2024-01-03

## 2022-12-01 RX ORDER — HYDROCHLOROTHIAZIDE 25 MG/1
25 TABLET ORAL DAILY
Qty: 90 TABLET | Refills: 3 | Status: SHIPPED | OUTPATIENT
Start: 2022-12-01 | End: 2024-01-03

## 2022-12-01 RX ORDER — PIOGLITAZONEHYDROCHLORIDE 30 MG/1
30 TABLET ORAL DAILY
Qty: 90 TABLET | Refills: 1 | Status: SHIPPED | OUTPATIENT
Start: 2022-12-01 | End: 2023-10-06

## 2022-12-01 RX ORDER — AMLODIPINE BESYLATE 5 MG/1
5 TABLET ORAL DAILY
Qty: 90 TABLET | Refills: 3 | Status: SHIPPED | OUTPATIENT
Start: 2022-12-01 | End: 2024-01-03

## 2022-12-01 NOTE — PROGRESS NOTES
Presents physical exam.  Blood pressure controlled.  Diabetes due for laboratory.  Hyperlipidemia on statin.  Emphysema still smokes.  Discussed cessation.  Previous bronchiectasis, COPD stable followed by [facility].      Misha was seen today for annual exam.    Diagnoses and all orders for this visit:    Routine history and physical examination of adult  -     Lipid Panel; Future  -     Hemoglobin A1C; Future  -     Microalbumin/Creatinine Ratio, Urine; Future  -     PSA, Screening; Future  -     Basic Metabolic Panel; Future    Hypertension associated with diabetes  -     Hemoglobin A1C; Future  -     Microalbumin/Creatinine Ratio, Urine; Future  -     Basic Metabolic Panel; Future  -     benazepriL (LOTENSIN) 40 MG tablet; Take 1 tablet (40 mg total) by mouth once daily.    Type 2 diabetes mellitus without complication, without long-term current use of insulin    Pulmonary emphysema, unspecified emphysema type    Bronchiectasis without complication    Combined hyperlipidemia associated with type 2 diabetes mellitus  -     Lipid Panel; Future    Screening for prostate cancer  -     PSA, Screening; Future    Smoking    Other orders  -     amLODIPine (NORVASC) 5 MG tablet; Take 1 tablet (5 mg total) by mouth once daily.  -     hydroCHLOROthiazide (HYDRODIURIL) 25 MG tablet; Take 1 tablet (25 mg total) by mouth once daily.  -     pioglitazone (ACTOS) 30 MG tablet; Take 1 tablet (30 mg total) by mouth once daily.  -     pravastatin (PRAVACHOL) 20 MG tablet; Take 1 tablet (20 mg total) by mouth once daily.    Declined vaccines today.  Continue current medications.    Anticipatory guidance: Don't smoke.  Healthy diet and regular exercise recommended.      Diabetes Management Status    Statin: Taking  ACE/ARB: Taking    Screening or Prevention Patient's value Goal Complete/Controlled?   HgA1C Testing and Control   Lab Results   Component Value Date    HGBA1C 6.9 (H) 11/05/2021      Annually/Less than 8% No      Lipid profile : 11/05/2021 Annually No     LDL control Lab Results   Component Value Date    LDLCALC 76.6 11/05/2021    Annually/Less than 100 mg/dl  No     Nephropathy screening Lab Results   Component Value Date    LABMICR 13.0 11/05/2021     No results found for: PROTEINUA Annually No     Blood pressure BP Readings from Last 1 Encounters:   12/01/22 134/84    Less than 140/90 Yes     Dilated retinal exam : 08/16/2022 Annually Yes     Foot exam   : 12/01/2022 Annually Yes         Past Medical History:  Past Medical History:   Diagnosis Date    Adrenal adenoma 2008    bx benign    Arthritis     Bronchiectasis 05/15/2012    Cataract     COPD, mild 10/02/2017    COVID-19 05/23/2022    [facility] [address] [place]    COVID-19 ruled out by laboratory testing 01/11/2022    [facility] [place]    COVID-19 ruled out by laboratory testing 05/31/2022    [facility] [address] [place]    Diabetes mellitus, type 2     Emphysema     Hemoptysis     Hyperlipidemia     Hyperplastic colon polyp     Hypertension     Lung nodules     small stable on serial ct    Lymphocytosis     reactive    Peptic ulcer disease     Pneumonia     Tobacco abuse 05/15/2012     Past Surgical History:   Procedure Laterality Date    APPENDECTOMY      BONE MARROW BIOPSY      BRONCHOSCOPY      COLONOSCOPY  08/06/2013         KNEE ARTHROSCOPY Left     MULTIPLE TOOTH EXTRACTIONS      NASAL FRACTURE SURGERY      [place] CLOSED TX NASAL BONE FX W/MNPJ W/STABILIZATION     Review of patient's allergies indicates:   Allergen Reactions    Aspirin      Other reaction(s): Stomach upset  Other reaction(s): Nausea    Hydrocodone Itching and Rash     Current Outpatient Medications on File Prior to Visit   Medication Sig Dispense Refill    albuterol (PROVENTIL/VENTOLIN HFA) 90 mcg/actuation inhaler Inhale 2 puffs into the lungs every 6 (six) hours as needed.      ALBUTEROL INHL Inhale into the lungs as needed.        HYDROcodone-acetaminophen (NORCO) 5-325 mg per tablet Take 1 tablet by mouth every 6 (six) hours as needed.      VENTOLIN HFA 90 mcg/actuation inhaler INHALE TWO PUFFS EVERY 4 TO 6 HOURS AS NEEDED 18 g 2    [DISCONTINUED] amLODIPine (NORVASC) 5 MG tablet Take 1 tablet (5 mg total) by mouth once daily. 90 tablet 1    [DISCONTINUED] benazepriL (LOTENSIN) 40 MG tablet Take 1 tablet (40 mg total) by mouth once daily. 90 tablet 1    [DISCONTINUED] hydroCHLOROthiazide (HYDRODIURIL) 25 MG tablet Take 1 tablet (25 mg total) by mouth once daily. 90 tablet 1    [DISCONTINUED] pravastatin (PRAVACHOL) 20 MG tablet Take 1 tablet (20 mg total) by mouth once daily. 90 tablet 3    blood sugar diagnostic (BLOOD GLUCOSE TEST) Strp Test glucose 1 x daily (use insurance preferred brand) (Patient not taking: No sig reported) 50 strip 11    blood-glucose meter Misc Use as directed (Patient not taking: No sig reported) 1 each prn    lancets Misc As directed (Patient not taking: No sig reported) 100 each prn    [DISCONTINUED] pioglitazone (ACTOS) 30 MG tablet Take 1 tablet (30 mg total) by mouth once daily. 90 tablet 3     No current facility-administered medications on file prior to visit.     Social History     Socioeconomic History    Marital status:    Tobacco Use    Smoking status: Some Days     Packs/day: 0.25     Years: 53.00     Pack years: 13.25     Types: Cigarettes, Pipe     Last attempt to quit: 12/11/2018     Years since quitting: 3.9    Smokeless tobacco: Never    Tobacco comments:     1 pack every 3 days   Substance and Sexual Activity    Alcohol use: No     Alcohol/week: 1.0 standard drink     Types: 1 Cans of beer per week     Comment: 1 beer every 2 weeks    Drug use: No     Family History   Problem Relation Age of Onset    Kidney failure Father     Diabetes Father     Heart disease Mother 60    Diabetes Brother     Cancer Brother         prostate    Glaucoma Brother         ?    Cancer Brother     Amblyopia Neg Hx      Blindness Neg Hx     Cataracts Neg Hx     Hypertension Neg Hx     Macular degeneration Neg Hx     Retinal detachment Neg Hx     Strabismus Neg Hx     Stroke Neg Hx     Thyroid disease Neg Hx              ROS:  GENERAL: No fever, chills,  or significant weight changes.  HEENT: No headache or hearing complaints.  No dysphagia  Eyes: No vision complaints  CHEST: Denies HERNÁNDEZ, cyanosis, wheezing, cough and sputum production.  CARDIOVASCULAR: Denies chest pain, PND, orthopnea or reduced exercise tolerance.  ABDOMEN: Appetite fine. Denies diarrhea, abdominal pain, hematemesis or blood in stool.  URINARY: No flank pain, dysuria or hematuria.  MUSCULOSKELETAL: No warmth swelling or tenderness of the joints  NEUROLOGIC: No focal weakness numbness or paresthesia  PSYCHIATRIC: Denies depression      OBJECTIVE:   Vitals:    12/01/22 1030 12/01/22 1054   BP: (!) 154/81 134/84   Pulse: (!) 50    Temp: 97 °F (36.1 °C)    TempSrc: Temporal    SpO2: 98%    Weight: 78.2 kg (172 lb 6.4 oz)    Height: 6' (1.829 m)      Wt Readings from Last 3 Encounters:   12/01/22 78.2 kg (172 lb 6.4 oz)   10/25/22 77.6 kg (171 lb)   10/24/22 78 kg (171 lb 13.6 oz)       APPEARANCE: Well nourished, well developed, in no acute distress.   HEAD: Normocephalic. Atraumatic. No sinus tenderness.   EYES:   Right eye: Pupil reactive. Conjunctiva clear.   Left eye: Pupil reactive. Conjunctiva clear.   Both fundi: Grossly normal to nondilated exam. EOMI.   EARS: TM's intact. Light reflex normal. No retraction or perforation.   NOSE: clear.   MOUTH & THROAT: No pharyngeal erythema or exudate. No lesions.   NECK: No bruits. No JVD. No cervical lymphadenopathy. No thyromegaly.   CHEST: Breath sounds clear bilaterally. Normal respiratory effort   CARDIOVASCULAR: Normal rate. Regular rhythm. No murmurs. No rub. No gallops.   ABDOMEN: Bowel sounds normal. Soft. No tenderness. No organomegaly.   PERIPHERAL VASCULAR: No cyanosis. No clubbing. No edema.   NEUROLOGIC:  No focal findings.    Feet:Sensation in the feet intact to monofilament testing.   No significant foot lesions.  He is using a walking boot on the right due to recent fractures. Pulses somewhat decreased but palpable.    MENTAL STATUS: Alert. Oriented x 3.

## 2022-12-01 NOTE — PATIENT INSTRUCTIONS
Health Maintenance Due   Topic Date Due    COVID-19 Vaccine (1) Never done    Shingles Vaccine (3 of 3) 08/17/2020    Lipid Panel  11/05/2022    Foot Exam  11/10/2022    Diabetes Urine Screening  11/05/2022

## 2022-12-02 LAB
ANION GAP SERPL CALC-SCNC: 10 MMOL/L (ref 8–16)
BUN SERPL-MCNC: 9 MG/DL (ref 8–23)
CALCIUM SERPL-MCNC: 9.7 MG/DL (ref 8.7–10.5)
CHLORIDE SERPL-SCNC: 105 MMOL/L (ref 95–110)
CHOLEST SERPL-MCNC: 133 MG/DL (ref 120–199)
CHOLEST/HDLC SERPL: 3 {RATIO} (ref 2–5)
CO2 SERPL-SCNC: 26 MMOL/L (ref 23–29)
COMPLEXED PSA SERPL-MCNC: 1 NG/ML (ref 0–4)
CREAT SERPL-MCNC: 1.1 MG/DL (ref 0.5–1.4)
EST. GFR  (NO RACE VARIABLE): >60 ML/MIN/1.73 M^2
ESTIMATED AVG GLUCOSE: 166 MG/DL (ref 68–131)
GLUCOSE SERPL-MCNC: 124 MG/DL (ref 70–110)
HBA1C MFR BLD: 7.4 % (ref 4–5.6)
HDLC SERPL-MCNC: 45 MG/DL (ref 40–75)
HDLC SERPL: 33.8 % (ref 20–50)
LDLC SERPL CALC-MCNC: 76.6 MG/DL (ref 63–159)
NONHDLC SERPL-MCNC: 88 MG/DL
POTASSIUM SERPL-SCNC: 4.3 MMOL/L (ref 3.5–5.1)
SODIUM SERPL-SCNC: 141 MMOL/L (ref 136–145)
TRIGL SERPL-MCNC: 57 MG/DL (ref 30–150)

## 2022-12-06 ENCOUNTER — HOSPITAL ENCOUNTER (OUTPATIENT)
Dept: RADIOLOGY | Facility: HOSPITAL | Age: 67
Discharge: HOME | End: 2022-12-06
Attending: STUDENT IN AN ORGANIZED HEALTH CARE EDUCATION/TRAINING PROGRAM
Payer: COMMERCIAL

## 2022-12-06 ENCOUNTER — OFFICE VISIT (OUTPATIENT)
Dept: PODIATRY | Facility: CLINIC | Age: 67
End: 2022-12-06
Payer: COMMERCIAL

## 2022-12-06 DIAGNOSIS — L84 PRE-ULCERATIVE CALLUSES: ICD-10-CM

## 2022-12-06 DIAGNOSIS — S92.334A CLOSED NONDISPLACED FRACTURE OF THIRD METATARSAL BONE OF RIGHT FOOT, INITIAL ENCOUNTER: ICD-10-CM

## 2022-12-06 DIAGNOSIS — S92.324A CLOSED NONDISPLACED FRACTURE OF SECOND METATARSAL BONE OF RIGHT FOOT, INITIAL ENCOUNTER: ICD-10-CM
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
## 2022-12-06 DIAGNOSIS — S92.901A CLOSED FRACTURE OF RIGHT FOOT, INITIAL ENCOUNTER: Primary | ICD-10-CM

## 2022-12-06 PROCEDURE — 99213 OFFICE O/P EST LOW 20 MIN: CPT | Mod: S$GLB,,, | Performed by: STUDENT IN AN ORGANIZED HEALTH CARE EDUCATION/TRAINING PROGRAM

## 2022-12-06 PROCEDURE — 73630 X-RAY EXAM OF FOOT: CPT | Mod: 26,RT,, | Performed by: RADIOLOGY

## 2022-12-06 PROCEDURE — 73630 X-RAY EXAM OF FOOT: CPT | Mod: TC,PO,RT

## 2022-12-06 PROCEDURE — 73630 XR FOOT COMPLETE 3 VIEW RIGHT: ICD-10-PCS | Mod: 26,RT,, | Performed by: RADIOLOGY

## 2022-12-06 PROCEDURE — 99999 PR PBB SHADOW E&M-EST. PATIENT-LVL III: ICD-10-PCS | Mod: PBBFAC,,, | Performed by: STUDENT IN AN ORGANIZED HEALTH CARE EDUCATION/TRAINING PROGRAM

## 2022-12-06 PROCEDURE — 99999 PR PBB SHADOW E&M-EST. PATIENT-LVL III: CPT | Mod: PBBFAC,,, | Performed by: STUDENT IN AN ORGANIZED HEALTH CARE EDUCATION/TRAINING PROGRAM

## 2022-12-06 PROCEDURE — 99213 PR OFFICE/OUTPT VISIT, EST, LEVL III, 20-29 MIN: ICD-10-PCS | Mod: S$GLB,,, | Performed by: STUDENT IN AN ORGANIZED HEALTH CARE EDUCATION/TRAINING PROGRAM
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
## 2023-01-06 ENCOUNTER — TELEPHONE (OUTPATIENT)
Dept: PODIATRY | Facility: CLINIC | Age: 68
End: 2023-01-06
Payer: COMMERCIAL

## 2023-01-06 NOTE — TELEPHONE ENCOUNTER
----- Message from Cindi Hudson sent at 1/6/2023 10:47 AM CST -----  Contact: daphnie  Type: Needs Medical Advice  Who Called:  Daphnie with [employer] (employer)  Best Call Back Number: [phone]  Additional Information: needs a letter that says he can return to work after the appt and xray

## 2023-01-10 ENCOUNTER — HOSPITAL ENCOUNTER (OUTPATIENT)
Dept: RADIOLOGY | Facility: HOSPITAL | Age: 68
Discharge: HOME | End: 2023-01-10
Attending: STUDENT IN AN ORGANIZED HEALTH CARE EDUCATION/TRAINING PROGRAM
Payer: COMMERCIAL

## 2023-01-10 ENCOUNTER — OFFICE VISIT (OUTPATIENT)
Dept: PODIATRY | Facility: CLINIC | Age: 68
End: 2023-01-10
Payer: COMMERCIAL
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
## 2023-01-10 DIAGNOSIS — S92.901A CLOSED FRACTURE OF RIGHT FOOT, INITIAL ENCOUNTER: ICD-10-CM

## 2023-01-10 DIAGNOSIS — S92.901A CLOSED FRACTURE OF RIGHT FOOT, INITIAL ENCOUNTER: Primary | ICD-10-CM

## 2023-01-10 DIAGNOSIS — S92.334A CLOSED NONDISPLACED FRACTURE OF THIRD METATARSAL BONE OF RIGHT FOOT, INITIAL ENCOUNTER: ICD-10-CM

## 2023-01-10 DIAGNOSIS — S92.324A CLOSED NONDISPLACED FRACTURE OF SECOND METATARSAL BONE OF RIGHT FOOT, INITIAL ENCOUNTER: ICD-10-CM

## 2023-01-10 PROCEDURE — 73630 X-RAY EXAM OF FOOT: CPT | Mod: 26,RT,, | Performed by: RADIOLOGY

## 2023-01-10 PROCEDURE — 99999 PR PBB SHADOW E&M-EST. PATIENT-LVL III: ICD-10-PCS | Mod: PBBFAC,,, | Performed by: STUDENT IN AN ORGANIZED HEALTH CARE EDUCATION/TRAINING PROGRAM

## 2023-01-10 PROCEDURE — 73630 X-RAY EXAM OF FOOT: CPT | Mod: TC,PO,RT

## 2023-01-10 PROCEDURE — 99999 PR PBB SHADOW E&M-EST. PATIENT-LVL III: CPT | Mod: PBBFAC,,, | Performed by: STUDENT IN AN ORGANIZED HEALTH CARE EDUCATION/TRAINING PROGRAM

## 2023-01-10 PROCEDURE — 99213 OFFICE O/P EST LOW 20 MIN: CPT | Mod: S$GLB,,, | Performed by: STUDENT IN AN ORGANIZED HEALTH CARE EDUCATION/TRAINING PROGRAM

## 2023-01-10 PROCEDURE — 99213 PR OFFICE/OUTPT VISIT, EST, LEVL III, 20-29 MIN: ICD-10-PCS | Mod: S$GLB,,, | Performed by: STUDENT IN AN ORGANIZED HEALTH CARE EDUCATION/TRAINING PROGRAM

## 2023-01-10 PROCEDURE — 73630 XR FOOT COMPLETE 3 VIEW RIGHT: ICD-10-PCS | Mod: 26,RT,, | Performed by: RADIOLOGY
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
## 2023-01-31 ENCOUNTER — OFFICE VISIT (OUTPATIENT)
Dept: PODIATRY | Facility: CLINIC | Age: 68
End: 2023-01-31
Payer: COMMERCIAL

## 2023-01-31 ENCOUNTER — HOSPITAL ENCOUNTER (OUTPATIENT)
Dept: RADIOLOGY | Facility: HOSPITAL | Age: 68
Discharge: HOME | End: 2023-01-31
Attending: STUDENT IN AN ORGANIZED HEALTH CARE EDUCATION/TRAINING PROGRAM
Payer: COMMERCIAL

## 2023-01-31 DIAGNOSIS — S92.334A CLOSED NONDISPLACED FRACTURE OF THIRD METATARSAL BONE OF RIGHT FOOT, INITIAL ENCOUNTER: ICD-10-CM

## 2023-01-31 DIAGNOSIS — S92.901A CLOSED FRACTURE OF RIGHT FOOT, INITIAL ENCOUNTER: ICD-10-CM

## 2023-01-31 DIAGNOSIS — S92.901A CLOSED FRACTURE OF RIGHT FOOT, INITIAL ENCOUNTER: Primary | ICD-10-CM

## 2023-01-31 DIAGNOSIS — S92.324A CLOSED NONDISPLACED FRACTURE OF SECOND METATARSAL BONE OF RIGHT FOOT, INITIAL ENCOUNTER: ICD-10-CM
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
## 2023-01-31 PROCEDURE — 99213 OFFICE O/P EST LOW 20 MIN: CPT | Mod: S$GLB,,, | Performed by: STUDENT IN AN ORGANIZED HEALTH CARE EDUCATION/TRAINING PROGRAM

## 2023-01-31 PROCEDURE — 99999 PR PBB SHADOW E&M-EST. PATIENT-LVL III: ICD-10-PCS | Mod: PBBFAC,,, | Performed by: STUDENT IN AN ORGANIZED HEALTH CARE EDUCATION/TRAINING PROGRAM

## 2023-01-31 PROCEDURE — 73630 XR FOOT COMPLETE 3 VIEW RIGHT: ICD-10-PCS | Mod: 26,RT,, | Performed by: RADIOLOGY

## 2023-01-31 PROCEDURE — 73630 X-RAY EXAM OF FOOT: CPT | Mod: 26,RT,, | Performed by: RADIOLOGY

## 2023-01-31 PROCEDURE — 99999 PR PBB SHADOW E&M-EST. PATIENT-LVL III: CPT | Mod: PBBFAC,,, | Performed by: STUDENT IN AN ORGANIZED HEALTH CARE EDUCATION/TRAINING PROGRAM

## 2023-01-31 PROCEDURE — 99213 PR OFFICE/OUTPT VISIT, EST, LEVL III, 20-29 MIN: ICD-10-PCS | Mod: S$GLB,,, | Performed by: STUDENT IN AN ORGANIZED HEALTH CARE EDUCATION/TRAINING PROGRAM

## 2023-01-31 PROCEDURE — 99213 OFFICE O/P EST LOW 20 MIN: CPT | Mod: PBBFAC,PN | Performed by: STUDENT IN AN ORGANIZED HEALTH CARE EDUCATION/TRAINING PROGRAM

## 2023-01-31 PROCEDURE — 73630 X-RAY EXAM OF FOOT: CPT | Mod: TC,PO,RT

## 2023-01-31 PROCEDURE — 3072F PR LOW RISK FOR RETINOPATHY: ICD-10-PCS | Mod: ,,, | Performed by: STUDENT IN AN ORGANIZED HEALTH CARE EDUCATION/TRAINING PROGRAM

## 2023-01-31 PROCEDURE — 3072F LOW RISK FOR RETINOPATHY: CPT | Mod: ,,, | Performed by: STUDENT IN AN ORGANIZED HEALTH CARE EDUCATION/TRAINING PROGRAM
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
## 2023-03-01 ENCOUNTER — OFFICE VISIT (OUTPATIENT)
Dept: PODIATRY | Facility: CLINIC | Age: 68
End: 2023-03-01
Payer: COMMERCIAL

## 2023-03-01 ENCOUNTER — LAB VISIT (OUTPATIENT)
Dept: LAB | Facility: HOSPITAL | Age: 68
End: 2023-03-01
Attending: FAMILY MEDICINE
Payer: COMMERCIAL

## 2023-03-01 ENCOUNTER — HOSPITAL ENCOUNTER (OUTPATIENT)
Dept: RADIOLOGY | Facility: HOSPITAL | Age: 68
Discharge: HOME | End: 2023-03-01
Attending: STUDENT IN AN ORGANIZED HEALTH CARE EDUCATION/TRAINING PROGRAM
Payer: COMMERCIAL

## 2023-03-01 DIAGNOSIS — S92.901A CLOSED FRACTURE OF RIGHT FOOT, INITIAL ENCOUNTER: Primary | ICD-10-CM

## 2023-03-01 DIAGNOSIS — S92.334A CLOSED NONDISPLACED FRACTURE OF THIRD METATARSAL BONE OF RIGHT FOOT, INITIAL ENCOUNTER: ICD-10-CM

## 2023-03-01 DIAGNOSIS — S92.334G CLOSED NONDISPLACED FRACTURE OF THIRD METATARSAL BONE OF RIGHT FOOT WITH DELAYED HEALING, SUBSEQUENT ENCOUNTER: ICD-10-CM

## 2023-03-01 DIAGNOSIS — S92.324A CLOSED NONDISPLACED FRACTURE OF SECOND METATARSAL BONE OF RIGHT FOOT, INITIAL ENCOUNTER: ICD-10-CM
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
## 2023-03-01 DIAGNOSIS — E11.9 TYPE 2 DIABETES MELLITUS WITHOUT COMPLICATION, WITHOUT LONG-TERM CURRENT USE OF INSULIN: ICD-10-CM

## 2023-03-01 DIAGNOSIS — S92.901A CLOSED FRACTURE OF RIGHT FOOT, INITIAL ENCOUNTER: ICD-10-CM

## 2023-03-01 DIAGNOSIS — S92.324G CLOSED NONDISPLACED FRACTURE OF SECOND METATARSAL BONE OF RIGHT FOOT WITH DELAYED HEALING, SUBSEQUENT ENCOUNTER: ICD-10-CM

## 2023-03-01 PROCEDURE — 73630 X-RAY EXAM OF FOOT: CPT | Mod: 26,RT,, | Performed by: RADIOLOGY

## 2023-03-01 PROCEDURE — 83036 HEMOGLOBIN GLYCOSYLATED A1C: CPT | Performed by: FAMILY MEDICINE

## 2023-03-01 PROCEDURE — 36415 COLL VENOUS BLD VENIPUNCTURE: CPT | Mod: PO | Performed by: FAMILY MEDICINE

## 2023-03-01 PROCEDURE — 99999 PR PBB SHADOW E&M-EST. PATIENT-LVL III: ICD-10-PCS | Mod: PBBFAC,,, | Performed by: STUDENT IN AN ORGANIZED HEALTH CARE EDUCATION/TRAINING PROGRAM

## 2023-03-01 PROCEDURE — 99999 PR PBB SHADOW E&M-EST. PATIENT-LVL III: CPT | Mod: PBBFAC,,, | Performed by: STUDENT IN AN ORGANIZED HEALTH CARE EDUCATION/TRAINING PROGRAM

## 2023-03-01 PROCEDURE — 99213 PR OFFICE/OUTPT VISIT, EST, LEVL III, 20-29 MIN: ICD-10-PCS | Mod: S$GLB,,, | Performed by: STUDENT IN AN ORGANIZED HEALTH CARE EDUCATION/TRAINING PROGRAM

## 2023-03-01 PROCEDURE — 73630 XR FOOT COMPLETE 3 VIEW RIGHT: ICD-10-PCS | Mod: 26,RT,, | Performed by: RADIOLOGY

## 2023-03-01 PROCEDURE — 99213 OFFICE O/P EST LOW 20 MIN: CPT | Mod: S$GLB,,, | Performed by: STUDENT IN AN ORGANIZED HEALTH CARE EDUCATION/TRAINING PROGRAM

## 2023-03-01 PROCEDURE — 73630 X-RAY EXAM OF FOOT: CPT | Mod: TC,PO,RT

## 2023-03-01 NOTE — PROGRESS NOTES
Subjective:      Patient ID: Misha Simpson is a 67 y.o. male.    Chief Complaint: Follow-up (3 wk follow up)      Patient continues to have some pain and swelling to the R foot from the injury. He is in a boot today.    12/06/2022: Patient returns for follow-up of fractures of the right foot.  He still continues to have some pain and swelling.    1/10/23: follow up R foot fracture. It is still hurting and swollen.    01/31/2023: Follow-up for right foot fracture.  It is still hurting although less.  There is still some swelling to the area.    3/1/23: Follow-up for right foot fracture.  It is still hurting although less.  There is still some swelling to the area.    Review of Systems   Musculoskeletal:         R foot pain and swelling   All other systems reviewed and are negative.        Objective:      Physical Exam  Cardiovascular:      Pulses:           Dorsalis pedis pulses are 2+ on the right side.        Posterior tibial pulses are 2+ on the right side.   Feet:      Right foot:      Skin integrity: Skin integrity normal.      Toenail Condition: Right toenails are normal.      Comments: R foot:  Minor focal edema.  There is still tenderness to palpation over the 2nd metatarsal and the surrounding interspaces of the 2nd metatarsal.  There is less tenderness to palpation to the 3rd metatarsal today.          Assessment:       Encounter Diagnoses   Name Primary?    Closed fracture of right foot, initial encounter Yes    Closed nondisplaced fracture of second metatarsal bone of right foot with delayed healing, subsequent encounter     Closed nondisplaced fracture of third metatarsal bone of right foot with delayed healing, subsequent encounter          Plan:       Misha was seen today for follow-up.    Diagnoses and all orders for this visit:    Closed fracture of right foot, initial encounter    Closed nondisplaced fracture of second metatarsal bone of right foot with delayed healing, subsequent encounter  -      Bone Stimulator for Home Use    Closed nondisplaced fracture of third metatarsal bone of right foot with delayed healing, subsequent encounter  -     Bone Stimulator for Home Use    I counseled the patient on his conditions, their implications and medical management.    X-rays reviewed today with little healing since the last visit to the 2nd and 3rd metatarsals.  The 3rd metatarsal has more healing than the 2nd.  I recommend that the patient stop smoking in order to accelerate healing.    Continue to rest, ice, elevate and compress.  Continue the surgical shoe. Follow-up in 4 weeks with repeat x-ray.     Rx sent for bone stimulator due to delayed bone healing of the right foot.    Keith Kang DPM

## 2023-03-02 LAB
ESTIMATED AVG GLUCOSE: 146 MG/DL (ref 68–131)
HBA1C MFR BLD: 6.7 % (ref 4–5.6)

## 2023-04-03 ENCOUNTER — HOSPITAL ENCOUNTER (OUTPATIENT)
Dept: RADIOLOGY | Facility: HOSPITAL | Age: 68
Discharge: HOME | End: 2023-04-03
Attending: STUDENT IN AN ORGANIZED HEALTH CARE EDUCATION/TRAINING PROGRAM
Payer: COMMERCIAL

## 2023-04-03 ENCOUNTER — OFFICE VISIT (OUTPATIENT)
Dept: PODIATRY | Facility: CLINIC | Age: 68
End: 2023-04-03
Payer: COMMERCIAL

## 2023-04-03 VITALS — HEIGHT: 72 IN | WEIGHT: 172.38 LBS | BODY MASS INDEX: 23.35 KG/M2

## 2023-04-03 DIAGNOSIS — S92.324A CLOSED NONDISPLACED FRACTURE OF SECOND METATARSAL BONE OF RIGHT FOOT, INITIAL ENCOUNTER: ICD-10-CM

## 2023-04-03 DIAGNOSIS — S92.334G CLOSED NONDISPLACED FRACTURE OF THIRD METATARSAL BONE OF RIGHT FOOT WITH DELAYED HEALING, SUBSEQUENT ENCOUNTER: Primary | ICD-10-CM

## 2023-04-03 DIAGNOSIS — S92.334G CLOSED NONDISPLACED FRACTURE OF THIRD METATARSAL BONE OF RIGHT FOOT WITH DELAYED HEALING, SUBSEQUENT ENCOUNTER: ICD-10-CM

## 2023-04-03 DIAGNOSIS — S92.324G CLOSED NONDISPLACED FRACTURE OF SECOND METATARSAL BONE OF RIGHT FOOT WITH DELAYED HEALING, SUBSEQUENT ENCOUNTER: Primary | ICD-10-CM

## 2023-04-03 PROCEDURE — 99214 OFFICE O/P EST MOD 30 MIN: CPT | Mod: S$GLB,,, | Performed by: STUDENT IN AN ORGANIZED HEALTH CARE EDUCATION/TRAINING PROGRAM

## 2023-04-03 PROCEDURE — 99999 PR PBB SHADOW E&M-EST. PATIENT-LVL III: CPT | Mod: PBBFAC,,, | Performed by: STUDENT IN AN ORGANIZED HEALTH CARE EDUCATION/TRAINING PROGRAM

## 2023-04-03 PROCEDURE — 73630 X-RAY EXAM OF FOOT: CPT | Mod: TC,PO,RT

## 2023-04-03 PROCEDURE — 73630 XR FOOT COMPLETE 3 VIEW RIGHT: ICD-10-PCS | Mod: 26,RT,, | Performed by: RADIOLOGY

## 2023-04-03 PROCEDURE — 73630 X-RAY EXAM OF FOOT: CPT | Mod: 26,RT,, | Performed by: RADIOLOGY

## 2023-04-03 PROCEDURE — 99999 PR PBB SHADOW E&M-EST. PATIENT-LVL III: ICD-10-PCS | Mod: PBBFAC,,, | Performed by: STUDENT IN AN ORGANIZED HEALTH CARE EDUCATION/TRAINING PROGRAM

## 2023-04-03 PROCEDURE — 99214 PR OFFICE/OUTPT VISIT, EST, LEVL IV, 30-39 MIN: ICD-10-PCS | Mod: S$GLB,,, | Performed by: STUDENT IN AN ORGANIZED HEALTH CARE EDUCATION/TRAINING PROGRAM

## 2023-04-03 NOTE — PROGRESS NOTES
Subjective:      Patient ID: Misha Simpson is a 67 y.o. male.    Chief Complaint: Follow-up      Patient continues to have some pain and swelling to the R foot from the injury. He is in a boot today.    12/06/2022: Patient returns for follow-up of fractures of the right foot.  He still continues to have some pain and swelling.    1/10/23: follow up R foot fracture. It is still hurting and swollen.    01/31/2023: Follow-up for right foot fracture.  It is still hurting although less.  There is still some swelling to the area.    3/1/23: Follow-up for right foot fracture.  It is still hurting although less.  There is still some swelling to the area.    4/3/23:  Patient is a follow-up for right foot fracture.  It is hurting less but he still has some shooting pains from it from time to time.  The swelling is improved.  He is cut back on smoking some.  Patient never got the bone stimulator.    Review of Systems   Musculoskeletal:         R foot pain    All other systems reviewed and are negative.        Objective:      Physical Exam  Cardiovascular:      Pulses:           Dorsalis pedis pulses are 2+ on the right side.        Posterior tibial pulses are 2+ on the right side.   Feet:      Right foot:      Skin integrity: Skin integrity normal.      Toenail Condition: Right toenails are normal.      Comments: R foot:  Edema is resolved  There is still tenderness to palpation over the 2nd metatarsal and the surrounding interspaces of the 2nd metatarsal.  There is no tenderness to palpation to the 3rd metatarsal today.          Assessment:       Encounter Diagnoses   Name Primary?    Closed nondisplaced fracture of second metatarsal bone of right foot with delayed healing, subsequent encounter Yes    Closed nondisplaced fracture of third metatarsal bone of right foot with delayed healing, subsequent encounter            Plan:       Misha was seen today for follow-up.    Diagnoses and all orders for this visit:    Closed  nondisplaced fracture of second metatarsal bone of right foot with delayed healing, subsequent encounter    Closed nondisplaced fracture of third metatarsal bone of right foot with delayed healing, subsequent encounter        I counseled the patient on his conditions, their implications and medical management.    X-rays reviewed today of the right foot.  There has been good interval healing to the 2nd and 3rd metatarsals.  There is still significant bone callus at the 2nd metatarsal and less to the 3rd.  Overall, with the new increase in healing over the last month I am optimistic that the patient should be able to return to work soon.  We will get 1 final x-ray in about 10 days to determine.    Patient to follow-up in 10 days with repeat x-ray.    Keith Kang DPM

## 2023-04-10 ENCOUNTER — OFFICE VISIT (OUTPATIENT)
Dept: PODIATRY | Facility: CLINIC | Age: 68
End: 2023-04-10
Payer: COMMERCIAL

## 2023-04-10 ENCOUNTER — HOSPITAL ENCOUNTER (OUTPATIENT)
Dept: RADIOLOGY | Facility: HOSPITAL | Age: 68
Discharge: HOME | End: 2023-04-10
Attending: STUDENT IN AN ORGANIZED HEALTH CARE EDUCATION/TRAINING PROGRAM
Payer: COMMERCIAL

## 2023-04-10 VITALS — BODY MASS INDEX: 23.02 KG/M2 | WEIGHT: 169.75 LBS

## 2023-04-10 DIAGNOSIS — S92.324A CLOSED NONDISPLACED FRACTURE OF SECOND METATARSAL BONE OF RIGHT FOOT, INITIAL ENCOUNTER: ICD-10-CM

## 2023-04-10 DIAGNOSIS — S92.334G CLOSED NONDISPLACED FRACTURE OF THIRD METATARSAL BONE OF RIGHT FOOT WITH DELAYED HEALING, SUBSEQUENT ENCOUNTER: ICD-10-CM

## 2023-04-10 DIAGNOSIS — S92.334G CLOSED NONDISPLACED FRACTURE OF THIRD METATARSAL BONE OF RIGHT FOOT WITH DELAYED HEALING, SUBSEQUENT ENCOUNTER: Primary | ICD-10-CM

## 2023-04-10 PROCEDURE — 73630 XR FOOT COMPLETE 3 VIEW RIGHT: ICD-10-PCS | Mod: 26,RT,, | Performed by: RADIOLOGY

## 2023-04-10 PROCEDURE — 99999 PR PBB SHADOW E&M-EST. PATIENT-LVL III: ICD-10-PCS | Mod: PBBFAC,,, | Performed by: STUDENT IN AN ORGANIZED HEALTH CARE EDUCATION/TRAINING PROGRAM

## 2023-04-10 PROCEDURE — 99213 OFFICE O/P EST LOW 20 MIN: CPT | Mod: S$GLB,,, | Performed by: STUDENT IN AN ORGANIZED HEALTH CARE EDUCATION/TRAINING PROGRAM

## 2023-04-10 PROCEDURE — 73630 X-RAY EXAM OF FOOT: CPT | Mod: TC,PO,RT

## 2023-04-10 PROCEDURE — 99999 PR PBB SHADOW E&M-EST. PATIENT-LVL III: CPT | Mod: PBBFAC,,, | Performed by: STUDENT IN AN ORGANIZED HEALTH CARE EDUCATION/TRAINING PROGRAM

## 2023-04-10 PROCEDURE — 99213 PR OFFICE/OUTPT VISIT, EST, LEVL III, 20-29 MIN: ICD-10-PCS | Mod: S$GLB,,, | Performed by: STUDENT IN AN ORGANIZED HEALTH CARE EDUCATION/TRAINING PROGRAM

## 2023-04-10 PROCEDURE — 73630 X-RAY EXAM OF FOOT: CPT | Mod: 26,RT,, | Performed by: RADIOLOGY

## 2023-04-10 NOTE — PROGRESS NOTES
Subjective:      Patient ID: Misha Simpson is a 67 y.o. male.    Chief Complaint: Follow-up      Patient continues to have some pain and swelling to the R foot from the injury. He is in a boot today.    12/06/2022: Patient returns for follow-up of fractures of the right foot.  He still continues to have some pain and swelling.    1/10/23: follow up R foot fracture. It is still hurting and swollen.    01/31/2023: Follow-up for right foot fracture.  It is still hurting although less.  There is still some swelling to the area.    3/1/23: Follow-up for right foot fracture.  It is still hurting although less.  There is still some swelling to the area.    4/3/23:  Patient is a follow-up for right foot fracture.  It is hurting less but he still has some shooting pains from it from time to time.  The swelling is improved.  He is cut back on smoking some.  Patient never got the bone stimulator.    4/10/23: Patient is a follow up for R foot fracture.     Review of Systems   Musculoskeletal:         R foot pain    All other systems reviewed and are negative.        Objective:      Physical Exam  Cardiovascular:      Pulses:           Dorsalis pedis pulses are 2+ on the right side.        Posterior tibial pulses are 2+ on the right side.   Feet:      Right foot:      Skin integrity: Skin integrity normal.      Toenail Condition: Right toenails are normal.      Comments: R foot:  Edema is resolved  There is still tenderness to palpation over the 2nd metatarsal and the surrounding interspaces of the 2nd metatarsal.  There is no tenderness to palpation to the 3rd metatarsal today.          Assessment:       Encounter Diagnoses   Name Primary?    Closed nondisplaced fracture of third metatarsal bone of right foot with delayed healing, subsequent encounter Yes    Closed nondisplaced fracture of second metatarsal bone of right foot, initial encounter              Plan:       Misha was seen today for follow-up.    Diagnoses and all  orders for this visit:    Closed nondisplaced fracture of third metatarsal bone of right foot with delayed healing, subsequent encounter    Closed nondisplaced fracture of second metatarsal bone of right foot, initial encounter          I counseled the patient on his conditions, their implications and medical management.    Xrays reviewed with minimal changes since last week which is to be expected. There is sufficient healing for stability at this point and there is continued remodeling of the 2nd metatarsal fracture. The 3rd metatarsal appears fully healed.     Patient can return to work but he is to avoid high impact activities outside of work for an additional 8 weeks.    F/u 1 month.     Keith Kang DPM

## 2023-04-11 ENCOUNTER — OFFICE VISIT (OUTPATIENT)
Dept: FAMILY MEDICINE | Facility: CLINIC | Age: 68
End: 2023-04-11
Payer: COMMERCIAL

## 2023-04-11 VITALS
SYSTOLIC BLOOD PRESSURE: 138 MMHG | TEMPERATURE: 98 F | WEIGHT: 173 LBS | BODY MASS INDEX: 23.43 KG/M2 | HEIGHT: 72 IN | DIASTOLIC BLOOD PRESSURE: 70 MMHG | HEART RATE: 55 BPM

## 2023-04-11 DIAGNOSIS — E11.9 TYPE 2 DIABETES MELLITUS WITHOUT COMPLICATION, WITHOUT LONG-TERM CURRENT USE OF INSULIN: Primary | ICD-10-CM

## 2023-04-11 DIAGNOSIS — F17.200 SMOKING: ICD-10-CM

## 2023-04-11 DIAGNOSIS — J43.9 PULMONARY EMPHYSEMA, UNSPECIFIED EMPHYSEMA TYPE: ICD-10-CM

## 2023-04-11 DIAGNOSIS — I15.2 HYPERTENSION ASSOCIATED WITH DIABETES: ICD-10-CM

## 2023-04-11 DIAGNOSIS — E11.59 HYPERTENSION ASSOCIATED WITH DIABETES: ICD-10-CM

## 2023-04-11 DIAGNOSIS — M54.2 NECK PAIN: ICD-10-CM

## 2023-04-11 PROCEDURE — 3288F PR FALLS RISK ASSESSMENT DOCUMENTED: ICD-10-PCS | Mod: CPTII,S$GLB,, | Performed by: FAMILY MEDICINE

## 2023-04-11 PROCEDURE — 1159F MED LIST DOCD IN RCRD: CPT | Mod: CPTII,S$GLB,, | Performed by: FAMILY MEDICINE

## 2023-04-11 PROCEDURE — 99999 PR PBB SHADOW E&M-EST. PATIENT-LVL V: CPT | Mod: PBBFAC,,, | Performed by: FAMILY MEDICINE

## 2023-04-11 PROCEDURE — 3072F PR LOW RISK FOR RETINOPATHY: ICD-10-PCS | Mod: CPTII,S$GLB,, | Performed by: FAMILY MEDICINE

## 2023-04-11 PROCEDURE — 3078F DIAST BP <80 MM HG: CPT | Mod: CPTII,S$GLB,, | Performed by: FAMILY MEDICINE

## 2023-04-11 PROCEDURE — 3044F PR MOST RECENT HEMOGLOBIN A1C LEVEL <7.0%: ICD-10-PCS | Mod: CPTII,S$GLB,, | Performed by: FAMILY MEDICINE

## 2023-04-11 PROCEDURE — 99999 PR PBB SHADOW E&M-EST. PATIENT-LVL V: ICD-10-PCS | Mod: PBBFAC,,, | Performed by: FAMILY MEDICINE

## 2023-04-11 PROCEDURE — 3008F BODY MASS INDEX DOCD: CPT | Mod: CPTII,S$GLB,, | Performed by: FAMILY MEDICINE

## 2023-04-11 PROCEDURE — 3044F HG A1C LEVEL LT 7.0%: CPT | Mod: CPTII,S$GLB,, | Performed by: FAMILY MEDICINE

## 2023-04-11 PROCEDURE — 1101F PT FALLS ASSESS-DOCD LE1/YR: CPT | Mod: CPTII,S$GLB,, | Performed by: FAMILY MEDICINE

## 2023-04-11 PROCEDURE — 3075F SYST BP GE 130 - 139MM HG: CPT | Mod: CPTII,S$GLB,, | Performed by: FAMILY MEDICINE

## 2023-04-11 PROCEDURE — 3078F PR MOST RECENT DIASTOLIC BLOOD PRESSURE < 80 MM HG: ICD-10-PCS | Mod: CPTII,S$GLB,, | Performed by: FAMILY MEDICINE

## 2023-04-11 PROCEDURE — 1101F PR PT FALLS ASSESS DOC 0-1 FALLS W/OUT INJ PAST YR: ICD-10-PCS | Mod: CPTII,S$GLB,, | Performed by: FAMILY MEDICINE

## 2023-04-11 PROCEDURE — 3072F LOW RISK FOR RETINOPATHY: CPT | Mod: CPTII,S$GLB,, | Performed by: FAMILY MEDICINE

## 2023-04-11 PROCEDURE — 3075F PR MOST RECENT SYSTOLIC BLOOD PRESS GE 130-139MM HG: ICD-10-PCS | Mod: CPTII,S$GLB,, | Performed by: FAMILY MEDICINE

## 2023-04-11 PROCEDURE — 99214 OFFICE O/P EST MOD 30 MIN: CPT | Mod: S$GLB,,, | Performed by: FAMILY MEDICINE

## 2023-04-11 PROCEDURE — 99214 PR OFFICE/OUTPT VISIT, EST, LEVL IV, 30-39 MIN: ICD-10-PCS | Mod: S$GLB,,, | Performed by: FAMILY MEDICINE

## 2023-04-11 PROCEDURE — 3288F FALL RISK ASSESSMENT DOCD: CPT | Mod: CPTII,S$GLB,, | Performed by: FAMILY MEDICINE

## 2023-04-11 PROCEDURE — 3008F PR BODY MASS INDEX (BMI) DOCUMENTED: ICD-10-PCS | Mod: CPTII,S$GLB,, | Performed by: FAMILY MEDICINE

## 2023-04-11 PROCEDURE — 1159F PR MEDICATION LIST DOCUMENTED IN MEDICAL RECORD: ICD-10-PCS | Mod: CPTII,S$GLB,, | Performed by: FAMILY MEDICINE

## 2023-04-11 RX ORDER — ACETAMINOPHEN 500 MG
1000 TABLET ORAL EVERY 6 HOURS PRN
COMMUNITY
Start: 2023-04-11

## 2023-04-11 NOTE — PROGRESS NOTES
Complains of right neck pain which extends words the right occipital area during the past few days.  No radiation of symptoms.  No focal weakness.  Recent hemoglobin A1c was good.  Patient stopped taking his pioglitazone because see thought it was giving him some cramps in the hands.  He had been doing a lot of wood working.  Seems better now.  He has been on the pioglitazone for several years without issue.  Hypertension controlled.  Smoking interested in cessation.  Previous pulmonary emphysema on imaging.      Misha was seen today for shouder pain .    Diagnoses and all orders for this visit:    Type 2 diabetes mellitus without complication, without long-term current use of insulin    Neck pain    Hypertension associated with diabetes    Pulmonary emphysema, unspecified emphysema type    Smoking  -     Ambulatory referral/consult to Smoking Cessation Program; Future    Other orders  -     acetaminophen (TYLENOL) 500 MG tablet; Take 2 tablets (1,000 mg total) by mouth every 6 (six) hours as needed for Pain.      Patient will resume pioglitazone as this is not likely causing any cramps.  My likely overuse.  Follow-up if recurrent symptoms.  He will use Tylenol for his neck pain.  Can use ice or heat if needed.  Follow-up laboratory as already scheduled.          Diabetes Management Status    Statin: Taking  ACE/ARB: Taking    Screening or Prevention Patient's value Goal Complete/Controlled?   HgA1C Testing and Control   Lab Results   Component Value Date    HGBA1C 6.7 (H) 03/01/2023      Annually/Less than 8% Yes     Lipid profile : 12/01/2022 Annually Yes     LDL control Lab Results   Component Value Date    LDLCALC 76.6 12/01/2022    Annually/Less than 100 mg/dl  Yes     Nephropathy screening Lab Results   Component Value Date    LABMICR 8.0 12/01/2022     No results found for: PROTEINUA Annually Yes     Blood pressure BP Readings from Last 1 Encounters:   04/11/23 138/70    Less than 140/90 Yes     Dilated  retinal exam : 08/16/2022 Annually Yes     Foot exam   : 12/01/2022 Annually Yes         Past Medical History:  Past Medical History:   Diagnosis Date    Adrenal adenoma 2008    bx benign    Arthritis     Bronchiectasis 05/15/2012    Cataract     COPD, mild 10/02/2017    COVID-19 05/23/2022    [facility] [address] [place]    Diabetes mellitus, type 2     Emphysema     Hemoptysis     Hyperlipidemia     Hyperplastic colon polyp     Hypertension     Lung nodules     small stable on serial ct    Lymphocytosis     reactive    Peptic ulcer disease     Pneumonia     Tobacco abuse 05/15/2012     Past Surgical History:   Procedure Laterality Date    APPENDECTOMY      BONE MARROW BIOPSY      BRONCHOSCOPY      COLONOSCOPY  08/06/2013         KNEE ARTHROSCOPY Left     MULTIPLE TOOTH EXTRACTIONS      NASAL FRACTURE SURGERY      [facility] CLOSED TX NASAL BONE FX W/MNPJ W/STABILIZATION     Review of patient's allergies indicates:   Allergen Reactions    Aspirin      Other reaction(s): Stomach upset  Other reaction(s): Nausea    Hydrocodone Itching and Rash     Current Outpatient Medications on File Prior to Visit   Medication Sig Dispense Refill    albuterol (PROVENTIL/VENTOLIN HFA) 90 mcg/actuation inhaler Inhale 2 puffs into the lungs every 6 (six) hours as needed.      ALBUTEROL INHL Inhale into the lungs as needed.       amLODIPine (NORVASC) 5 MG tablet Take 1 tablet (5 mg total) by mouth once daily. 90 tablet 3    benazepriL (LOTENSIN) 40 MG tablet Take 1 tablet (40 mg total) by mouth once daily. 90 tablet 3    hydroCHLOROthiazide (HYDRODIURIL) 25 MG tablet Take 1 tablet (25 mg total) by mouth once daily. 90 tablet 3    pravastatin (PRAVACHOL) 20 MG tablet Take 1 tablet (20 mg total) by mouth once daily. 90 tablet 3    VENTOLIN HFA 90 mcg/actuation inhaler INHALE TWO PUFFS EVERY 4 TO 6 HOURS AS NEEDED 18 g 2    [DISCONTINUED] HYDROcodone-acetaminophen (NORCO) 5-325 mg per tablet Take 1 tablet by mouth every 6 (six)  hours as needed.      blood sugar diagnostic (BLOOD GLUCOSE TEST) Strp Test glucose 1 x daily (use insurance preferred brand) 50 strip 11    blood-glucose meter Misc Use as directed 1 each prn    lancets Misc As directed 100 each prn    pioglitazone (ACTOS) 30 MG tablet Take 1 tablet (30 mg total) by mouth once daily. 90 tablet 1     No current facility-administered medications on file prior to visit.     Social History     Socioeconomic History    Marital status:    Tobacco Use    Smoking status: Some Days     Packs/day: 0.25     Years: 53.00     Pack years: 13.25     Types: Cigarettes, Pipe     Last attempt to quit: 2018     Years since quittin.3    Smokeless tobacco: Never    Tobacco comments:     1 pack every 3 days   Substance and Sexual Activity    Alcohol use: No     Alcohol/week: 1.0 standard drink     Types: 1 Cans of beer per week     Comment: 1 beer every 2 weeks    Drug use: No     Family History   Problem Relation Age of Onset    Kidney failure Father     Diabetes Father     Heart disease Mother 60    Diabetes Brother     Cancer Brother         prostate    Glaucoma Brother         ?    Cancer Brother     Amblyopia Neg Hx     Blindness Neg Hx     Cataracts Neg Hx     Hypertension Neg Hx     Macular degeneration Neg Hx     Retinal detachment Neg Hx     Strabismus Neg Hx     Stroke Neg Hx     Thyroid disease Neg Hx            ROS:  GENERAL: No fever, chills,  or significant weight changes.   CARDIOVASCULAR: Denies chest pain, PND, orthopnea or reduced exercise tolerance.  ABDOMEN: Appetite fine. Denies diarrhea, abdominal pain, hematemesis or blood in stool.  URINARY: No flank pain, dysuria or hematuria.    Vitals:    23 1016   BP: 138/70   Pulse: (!) 55   Temp: 97.7 °F (36.5 °C)   TempSrc: Temporal   Weight: 78.5 kg (173 lb)   Height: 6' (1.829 m)       Wt Readings from Last 3 Encounters:   23 78.5 kg (173 lb)   04/10/23 77 kg (169 lb 12.1 oz)   23 78.2 kg (172 lb 6.4  oz)       APPEARANCE: Well nourished, well developed, in no acute distress.    HEAD: Normocephalic.  Atraumatic.  EYES:   Right eye: Pupil reactive.  Conjunctiva clear.    Left eye: Pupil reactive.  Conjunctiva clear.    NECK:  Mild decreased range of motion.  Mild tenderness palpation right paracervical muscles and trapezius.. No bruits.  No JVD.  No cervical lymphadenopathy.  No thyromegaly.    CHEST: Breath sounds clear bilaterally.  Normal respiratory effort  CARDIOVASCULAR: Normal rate.  Regular rhythm.  No murmurs.  No rub.  No gallops.   No edema.  MENTAL STATUS: Alert.  Oriented x 3.  Neurologic: Deep tendon reflexes intact.  Motor strength intact.

## 2023-04-11 NOTE — PATIENT INSTRUCTIONS
Health Maintenance Due   Topic Date Due    COVID-19 Vaccine (1) Never done    Shingles Vaccine (3 of 3) 08/17/2020

## 2023-05-10 ENCOUNTER — OFFICE VISIT (OUTPATIENT)
Dept: PODIATRY | Facility: CLINIC | Age: 68
End: 2023-05-10
Payer: COMMERCIAL

## 2023-05-10 VITALS — WEIGHT: 173 LBS | HEIGHT: 72 IN | BODY MASS INDEX: 23.43 KG/M2

## 2023-05-10 DIAGNOSIS — S92.334G CLOSED NONDISPLACED FRACTURE OF THIRD METATARSAL BONE OF RIGHT FOOT WITH DELAYED HEALING, SUBSEQUENT ENCOUNTER: Primary | ICD-10-CM

## 2023-05-10 PROCEDURE — 99999 PR PBB SHADOW E&M-EST. PATIENT-LVL III: ICD-10-PCS | Mod: PBBFAC,,, | Performed by: STUDENT IN AN ORGANIZED HEALTH CARE EDUCATION/TRAINING PROGRAM

## 2023-05-10 PROCEDURE — 99999 PR PBB SHADOW E&M-EST. PATIENT-LVL III: CPT | Mod: PBBFAC,,, | Performed by: STUDENT IN AN ORGANIZED HEALTH CARE EDUCATION/TRAINING PROGRAM

## 2023-05-10 PROCEDURE — 99213 PR OFFICE/OUTPT VISIT, EST, LEVL III, 20-29 MIN: ICD-10-PCS | Mod: S$GLB,,, | Performed by: STUDENT IN AN ORGANIZED HEALTH CARE EDUCATION/TRAINING PROGRAM

## 2023-05-10 PROCEDURE — 99213 OFFICE O/P EST LOW 20 MIN: CPT | Mod: S$GLB,,, | Performed by: STUDENT IN AN ORGANIZED HEALTH CARE EDUCATION/TRAINING PROGRAM

## 2023-05-11 NOTE — PROGRESS NOTES
Subjective:      Patient ID: Misha Simpson is a 67 y.o. male.    Chief Complaint: Foot Pain (Toe fracture)      Patient continues to have some pain and swelling to the R foot from the injury. He is in a boot today.    12/06/2022: Patient returns for follow-up of fractures of the right foot.  He still continues to have some pain and swelling.    1/10/23: follow up R foot fracture. It is still hurting and swollen.    01/31/2023: Follow-up for right foot fracture.  It is still hurting although less.  There is still some swelling to the area.    3/1/23: Follow-up for right foot fracture.  It is still hurting although less.  There is still some swelling to the area.    4/3/23:  Patient is a follow-up for right foot fracture.  It is hurting less but he still has some shooting pains from it from time to time.  The swelling is improved.  He is cut back on smoking some.  Patient never got the bone stimulator.    4/10/23: Patient is a follow up for R foot fracture.     5/10/23: f/u R foot fracture. He has returned to work and has significantly reduced pain.     Review of Systems   Musculoskeletal:         R foot pain    All other systems reviewed and are negative.        Objective:      Physical Exam  Cardiovascular:      Pulses:           Dorsalis pedis pulses are 2+ on the right side.        Posterior tibial pulses are 2+ on the right side.   Feet:      Right foot:      Skin integrity: Skin integrity normal.      Toenail Condition: Right toenails are normal.      Comments: R foot:  Edema is resolved  There is still tenderness to palpation over the 2nd metatarsal and the surrounding interspaces of the 2nd metatarsal.  There is no tenderness to palpation to the 3rd metatarsal today.          Assessment:       Encounter Diagnosis   Name Primary?    Closed nondisplaced fracture of third metatarsal bone of right foot with delayed healing, subsequent encounter Yes               Plan:       Misha was seen today for foot  pain.    Diagnoses and all orders for this visit:    Closed nondisplaced fracture of third metatarsal bone of right foot with delayed healing, subsequent encounter            I counseled the patient on his conditions, their implications and medical management.    Return to work without restrictions.    Keith Kang DPM

## 2023-06-29 ENCOUNTER — LAB VISIT (OUTPATIENT)
Dept: LAB | Facility: HOSPITAL | Age: 68
End: 2023-06-29
Attending: FAMILY MEDICINE
Payer: COMMERCIAL

## 2023-06-29 DIAGNOSIS — E11.9 TYPE 2 DIABETES MELLITUS WITHOUT COMPLICATION, WITHOUT LONG-TERM CURRENT USE OF INSULIN: ICD-10-CM

## 2023-06-29 LAB
ESTIMATED AVG GLUCOSE: 160 MG/DL (ref 68–131)
HBA1C MFR BLD: 7.2 % (ref 4–5.6)

## 2023-06-29 PROCEDURE — 36415 COLL VENOUS BLD VENIPUNCTURE: CPT | Mod: PO | Performed by: FAMILY MEDICINE

## 2023-06-29 PROCEDURE — 83036 HEMOGLOBIN GLYCOSYLATED A1C: CPT | Performed by: FAMILY MEDICINE

## 2023-07-03 ENCOUNTER — TELEPHONE (OUTPATIENT)
Dept: FAMILY MEDICINE | Facility: CLINIC | Age: 68
End: 2023-07-03
Payer: COMMERCIAL

## 2023-07-03 DIAGNOSIS — Z12.5 SCREENING FOR PROSTATE CANCER: Primary | ICD-10-CM

## 2023-07-03 NOTE — TELEPHONE ENCOUNTER
----- Message from Trino Snyder MD sent at 6/30/2023  8:09 AM CDT -----  Sugar increasing.  Continue current medication.  Watch diet..Schedule PSA, lipid panel, CMP, hemoglobin A1c,  urine microalbumin after December 1st.  My nurse will contact you to arrange.  Thanks,  Dr. Snyder

## 2023-10-06 RX ORDER — PIOGLITAZONEHYDROCHLORIDE 30 MG/1
30 TABLET ORAL DAILY
Qty: 90 TABLET | Refills: 0 | Status: SHIPPED | OUTPATIENT
Start: 2023-10-06 | End: 2024-01-03

## 2023-10-06 NOTE — TELEPHONE ENCOUNTER
No care due was identified.  [facility] Embedded Care Due Messages. Reference number: [id number].   10/06/2023 4:23:37 PM CDT

## 2023-10-07 NOTE — TELEPHONE ENCOUNTER
Refill Decision Note   Misha Simpson  is requesting a refill authorization.  Brief Assessment and Rationale for Refill:  Approve     Medication Therapy Plan:         Comments:     Note composed:11:32 PM 10/06/2023

## 2023-11-08 ENCOUNTER — OFFICE VISIT (OUTPATIENT)
Dept: OPTOMETRY | Facility: CLINIC | Age: 68
End: 2023-11-08
Payer: COMMERCIAL

## 2023-11-08 DIAGNOSIS — H40.013 OAG (OPEN ANGLE GLAUCOMA) SUSPECT, LOW RISK, BILATERAL: ICD-10-CM

## 2023-11-08 DIAGNOSIS — E11.9 DIABETES MELLITUS TYPE 2 WITHOUT RETINOPATHY: Primary | ICD-10-CM

## 2023-11-08 DIAGNOSIS — E11.36 CATARACT ASSOCIATED WITH TYPE 2 DIABETES MELLITUS: ICD-10-CM

## 2023-11-08 DIAGNOSIS — H43.393 VITREOUS FLOATERS, BILATERAL: ICD-10-CM

## 2023-11-08 PROCEDURE — 92014 COMPRE OPH EXAM EST PT 1/>: CPT | Mod: S$GLB,,, | Performed by: OPTOMETRIST

## 2023-11-08 PROCEDURE — 2023F DILAT RTA XM W/O RTNOPTHY: CPT | Mod: CPTII,S$GLB,, | Performed by: OPTOMETRIST

## 2023-11-08 PROCEDURE — 99999 PR PBB SHADOW E&M-EST. PATIENT-LVL II: ICD-10-PCS | Mod: PBBFAC,,, | Performed by: OPTOMETRIST

## 2023-11-08 PROCEDURE — 92133 POSTERIOR SEGMENT OCT OPTIC NERVE(OCULAR COHERENCE TOMOGRAPHY) - OU - BOTH EYES: ICD-10-PCS | Mod: S$GLB,,, | Performed by: OPTOMETRIST

## 2023-11-08 PROCEDURE — 3288F FALL RISK ASSESSMENT DOCD: CPT | Mod: CPTII,S$GLB,, | Performed by: OPTOMETRIST

## 2023-11-08 PROCEDURE — 92014 PR EYE EXAM, EST PATIENT,COMPREHESV: ICD-10-PCS | Mod: S$GLB,,, | Performed by: OPTOMETRIST

## 2023-11-08 PROCEDURE — 92133 CPTRZD OPH DX IMG PST SGM ON: CPT | Mod: S$GLB,,, | Performed by: OPTOMETRIST

## 2023-11-08 PROCEDURE — 4010F ACE/ARB THERAPY RXD/TAKEN: CPT | Mod: CPTII,S$GLB,, | Performed by: OPTOMETRIST

## 2023-11-08 PROCEDURE — 3288F PR FALLS RISK ASSESSMENT DOCUMENTED: ICD-10-PCS | Mod: CPTII,S$GLB,, | Performed by: OPTOMETRIST

## 2023-11-08 PROCEDURE — 1159F PR MEDICATION LIST DOCUMENTED IN MEDICAL RECORD: ICD-10-PCS | Mod: CPTII,S$GLB,, | Performed by: OPTOMETRIST

## 2023-11-08 PROCEDURE — 1101F PT FALLS ASSESS-DOCD LE1/YR: CPT | Mod: CPTII,S$GLB,, | Performed by: OPTOMETRIST

## 2023-11-08 PROCEDURE — 3051F PR MOST RECENT HEMOGLOBIN A1C LEVEL 7.0 - < 8.0%: ICD-10-PCS | Mod: CPTII,S$GLB,, | Performed by: OPTOMETRIST

## 2023-11-08 PROCEDURE — 1159F MED LIST DOCD IN RCRD: CPT | Mod: CPTII,S$GLB,, | Performed by: OPTOMETRIST

## 2023-11-08 PROCEDURE — 1101F PR PT FALLS ASSESS DOC 0-1 FALLS W/OUT INJ PAST YR: ICD-10-PCS | Mod: CPTII,S$GLB,, | Performed by: OPTOMETRIST

## 2023-11-08 PROCEDURE — 4010F PR ACE/ARB THEARPY RXD/TAKEN: ICD-10-PCS | Mod: CPTII,S$GLB,, | Performed by: OPTOMETRIST

## 2023-11-08 PROCEDURE — 2023F PR DILATED RETINAL EXAM W/O EVID OF RETINOPATHY: ICD-10-PCS | Mod: CPTII,S$GLB,, | Performed by: OPTOMETRIST

## 2023-11-08 PROCEDURE — 3051F HG A1C>EQUAL 7.0%<8.0%: CPT | Mod: CPTII,S$GLB,, | Performed by: OPTOMETRIST

## 2023-11-08 PROCEDURE — 99999 PR PBB SHADOW E&M-EST. PATIENT-LVL II: CPT | Mod: PBBFAC,,, | Performed by: OPTOMETRIST

## 2023-11-08 NOTE — PROGRESS NOTES
HPI    Pt. Here for DM annual exam. DLS - (8/16/2022)    Hemoglobin A1C       Date                     Value               Ref Range             Status                06/29/2023               7.2 (H)             4.0 - 5.6 %           Final              Comment:    ADA Screening Guidelines:  5.7-6.4%  Consistent with   prediabetes  >or=6.5%  Consistent with diabetes    High levels of fetal   hemoglobin interfere with the HbA1C  assay. Heterozygous hemoglobin   variants (HbS, HgC, etc)do  not significantly interfere with this assay.     However, presence of multiple variants may affect accuracy.         03/01/2023               6.7 (H)             4.0 - 5.6 %           Final              Comment:    ADA Screening Guidelines:  5.7-6.4%  Consistent with   prediabetes  >or=6.5%  Consistent with diabetes    High levels of fetal   hemoglobin interfere with the HbA1C  assay. Heterozygous hemoglobin   variants (HbS, HgC, etc)do  not significantly interfere with this assay.     However, presence of multiple variants may affect accuracy.         12/01/2022               7.4 (H)             4.0 - 5.6 %           Final              Comment:    ADA Screening Guidelines:  5.7-6.4%  Consistent with   prediabetes  >or=6.5%  Consistent with diabetes    High levels of fetal   hemoglobin interfere with the HbA1C  assay. Heterozygous hemoglobin   variants (HbS, HgC, etc)do  not significantly interfere with this assay.     However, presence of multiple variants may affect accuracy.    ----------     Pt. States VA is fine and unchanging.    Pt. States BP is being managed slowly.  Last edited by Franck Campoverde MA on 11/8/2023  1:39 PM.            Assessment /Plan     For exam results, see Encounter Report.    Diabetes mellitus type 2 without retinopathy    Cataract associated with type 2 diabetes mellitus    Vitreous floaters, bilateral    OAG (open angle glaucoma) suspect, low risk, bilateral  -     Posterior Segment OCT Optic Nerve-  Both eyes      No matty/ no csme, gave Diabetic Retinopathy info, advise tight control glucose, BP---Advise annual dilated fundus exam  2.   Vis sig NS w/ early cortical --not ready for consult, gave info, cautions night driving   3.   RD precautions given and reviewed. Patient knows to call/ message if any further changes in symptoms occur.  4.    Large c/d suspect w/ remote fhx  Angles open  Low IOP      Thin  /483    OCT 11/2023   G 96 wnl  G 93 wnl     OCT 11/2021  G 97 wnl  G 94 wnl  No progression\     Monitor / continue no tx     Ok continue with otc only   No new refraction     Letter w/ vision and health status given     Discussed and educated patient on current findings /plan.  RTC 1 year, prn if any changes / issues

## 2023-11-08 NOTE — PATIENT INSTRUCTIONS
"DRY EYES -- BURNING OR BEBO SYMPTOMS:  Use Over The Counter artificial tears as needed for dry eye symptoms.   Some common brands include:  Systane, Optive, Refresh, and Thera-Tears.  These drops can be used as frequently as desired, but may be most helpful use during long periods of concentrated work.  For example, reading / working at the computer. Start with 3-4x per day.     Nighttime Ophthalmic gel or ointments are available: Refresh PM, Genteal, and Lacrilube.    Avoid drops that "get redness out" (Visine, Murine, Clear Eyes), as these may contain medication that could further irritate the eyes, especially with chronic use.    ALLERGY EYES -- ITCHING SYMPTOMS:  Over the counter medications include--Pataday, Zaditor, and Alaway.  Use as directed 1-2 drops daily for symptoms of itching / watering eyes.  These drops will not help for dry eye or exposure symptoms.    REDNESS RELIEF:  Lumify---is a good redness reliever that will not cause irritation if used chronically.        FLASHES / FLOATERS / POSTERIOR VITREOUS DETACHMENT    Call the clinic if you have any further changes in symptoms.  Including:  Increased numbers of floaters or flashing lights, dimness or darkness that moves through or stays constant in your vision, or any pain in the eye (s).    You may sometimes see small specks or clouds moving in your field of vision.  They are called FLOATERS.  You can often see them when looking at a plain background, like a blank wall or blue trudi.  Floaters are actually tiny clumps of gel or cells inside the VITREOUS, the clear jelly-like fluid that fills the inside of your eye.    While these objects look like they are in front of your eye, they are actually floating inside.  What you see are the shadows they cast on the RETINA, the nerve layer at the back of the eye that senses light and allows you to see.      POSTERIOR VITREOUS DETACHMENT    The appearance of new floaters may be alarming.  If you suddenly " develop new floaters, you should contact your eye care professional  right away.    The retina can tear if the shrinking vitreous pulls away from the wall of the eye.  This sometimes causes a small amount of bleeding in the eye that may appear as new floaters.    A torn retina is always a serious problem, since it can lead to a retinal detachment.  You should see your eye care professional as soon as possible if:    even one new floater appears suddenly;  you see sudden flashes of light;  you notice other symptoms, like the loss of side vision, or a curtain closes down in your vision        POSTERIOR VITREOUS DETACHMENT is more common for people who:    are nearsighted;  have had cataract surgery;  have had YAG laser surgery of the eye;  have had inflammation inside the eye;  are over age 60.      While some floaters may remain visible, many of them will fade over time and become less noticeable.  Even if you've had some floaters for years, you should have your eyes checked as soon as possible if you notice new ones.    FLASHING LIGHTS    When the vitreous gel rubs or pulls on the retina, you may see what look like flashing lights or lightning streaks.  These flashes can appear off and on for several weeks or months.      Some people experience flashes of light that appear as jagged lines or heat waves in both eyes, lasting 10-20 minutes.  These flashes are caused by a spasm of blood vessels in the brain, which is called a migraine.    If a headache follows these flashes, it's called a migraine headache.  If   no headache occurs, these flashes are called Ophthalmic or Ocular Migraine.        DIABETES AND THE EYE / DIABETIC RETINOPATHY    Diabetic retinopathy is a condition occurring in persons with diabetes, which causes progressive damage to the retina, the light sensitive lining at the back of the eye. It is a serious sight-threatening complication of diabetes.    Diabetic retinopathy is the result of damage  to the tiny blood vessels that nourish the retina. They leak blood and other fluids that cause swelling of retinal tissue and clouding of vision. The condition usually affects both eyes. The longer a person has diabetes, the more likely they will develop diabetic retinopathy. If left untreated, diabetic retinopathy can cause blindness.  There are two basic types of diabetic retinopathy:    Background or nonproliferative diabetic retinopathy (NPDR)  Nonproliferative diabetic retinopathy (NPDR) is the earliest stage of diabetic retinopathy. With this condition, damaged blood vessels in the retina begin to leak extra fluid and small amounts of blood into the eye. Sometimes, deposits of cholesterol or other fats from the blood may leak into the retina. Many people with diabetes have mild NPDR, which usually does not affect their vision. However, if their vision is affected, it is the result of macular edema and macular ischemia.    If vision is affected due to macular changes, a consult with a Retina Specialist may be advised.  This is an ophthalmologist that treats retina conditions, including diabetic retinopathy.     Proliferative diabetic retinopathy (PDR)  Proliferative diabetic retinopathy (PDR) mainly occurs when many of the blood vessels in the retina close, preventing enough blood flow. In an attempt to supply blood to the area where the original vessels closed, the retina responds by growing new blood vessels. This is called neovascularization. However, these new blood vessels are abnormal and do not supply the retina with proper blood flow. The new vessels are also often accompanied by scar tissue that may cause the retina to wrinkle or detach. PDR may cause more severe vision loss than NPDR because it can affect both central and peripheral vision.     A patient diagnosed with proliferative diabetic eye disease will be referred to a retinal specialist for consultation.    Often there are no visual symptoms in  the early stages of diabetic retinopathy. That is why our eye care professionals recommend that everyone with diabetes have a comprehensive dilated eye examination once a year. Early detection and treatment can limit the potential for significant vision loss from diabetic retinopathy.

## 2023-12-08 ENCOUNTER — LAB VISIT (OUTPATIENT)
Dept: LAB | Facility: HOSPITAL | Age: 68
End: 2023-12-08
Attending: FAMILY MEDICINE
Payer: COMMERCIAL

## 2023-12-08 DIAGNOSIS — E11.9 TYPE 2 DIABETES MELLITUS WITHOUT COMPLICATION, WITHOUT LONG-TERM CURRENT USE OF INSULIN: ICD-10-CM

## 2023-12-08 DIAGNOSIS — Z12.5 SCREENING FOR PROSTATE CANCER: ICD-10-CM

## 2023-12-08 LAB
ALBUMIN SERPL BCP-MCNC: 4 G/DL (ref 3.5–5.2)
ALP SERPL-CCNC: 88 U/L (ref 55–135)
ALT SERPL W/O P-5'-P-CCNC: 18 U/L (ref 10–44)
ANION GAP SERPL CALC-SCNC: 12 MMOL/L (ref 8–16)
AST SERPL-CCNC: 33 U/L (ref 10–40)
BILIRUB SERPL-MCNC: 0.7 MG/DL (ref 0.1–1)
BUN SERPL-MCNC: 9 MG/DL (ref 8–23)
CALCIUM SERPL-MCNC: 9.4 MG/DL (ref 8.7–10.5)
CHLORIDE SERPL-SCNC: 103 MMOL/L (ref 95–110)
CHOLEST SERPL-MCNC: 130 MG/DL (ref 120–199)
CHOLEST/HDLC SERPL: 2.8 {RATIO} (ref 2–5)
CO2 SERPL-SCNC: 22 MMOL/L (ref 23–29)
COMPLEXED PSA SERPL-MCNC: 0.78 NG/ML (ref 0–4)
CREAT SERPL-MCNC: 1.1 MG/DL (ref 0.5–1.4)
EST. GFR  (NO RACE VARIABLE): >60 ML/MIN/1.73 M^2
ESTIMATED AVG GLUCOSE: 206 MG/DL (ref 68–131)
GLUCOSE SERPL-MCNC: 264 MG/DL (ref 70–110)
HBA1C MFR BLD: 8.8 % (ref 4–5.6)
HDLC SERPL-MCNC: 46 MG/DL (ref 40–75)
HDLC SERPL: 35.4 % (ref 20–50)
LDLC SERPL CALC-MCNC: 70.4 MG/DL (ref 63–159)
NONHDLC SERPL-MCNC: 84 MG/DL
POTASSIUM SERPL-SCNC: 4.2 MMOL/L (ref 3.5–5.1)
PROT SERPL-MCNC: 7.9 G/DL (ref 6–8.4)
SODIUM SERPL-SCNC: 137 MMOL/L (ref 136–145)
TRIGL SERPL-MCNC: 68 MG/DL (ref 30–150)

## 2023-12-08 PROCEDURE — 83036 HEMOGLOBIN GLYCOSYLATED A1C: CPT | Performed by: FAMILY MEDICINE

## 2023-12-08 PROCEDURE — 80053 COMPREHEN METABOLIC PANEL: CPT | Performed by: FAMILY MEDICINE

## 2023-12-08 PROCEDURE — 80061 LIPID PANEL: CPT | Performed by: FAMILY MEDICINE

## 2023-12-08 PROCEDURE — 84153 ASSAY OF PSA TOTAL: CPT | Performed by: FAMILY MEDICINE

## 2023-12-08 PROCEDURE — 36415 COLL VENOUS BLD VENIPUNCTURE: CPT | Mod: PO | Performed by: FAMILY MEDICINE

## 2023-12-18 ENCOUNTER — TELEPHONE (OUTPATIENT)
Dept: PHARMACY | Facility: CLINIC | Age: 68
End: 2023-12-18
Payer: COMMERCIAL

## 2023-12-26 RX ORDER — FLASH GLUCOSE SENSOR
KIT MISCELLANEOUS
Qty: 1 KIT | Refills: 11 | Status: SHIPPED | OUTPATIENT
Start: 2023-12-26

## 2023-12-26 NOTE — TELEPHONE ENCOUNTER
No care due was identified.  Health [facility] Embedded Care Due Messages. Reference number: [id number].   12/26/2023 11:03:32 AM CST

## 2023-12-26 NOTE — TELEPHONE ENCOUNTER
----- Message from Radha Smith sent at 12/26/2023 10:19 AM CST -----  Pt's wife called regarding patients glucose and to get an order for the Free Style Katheryn #2.  Call back number is [phone] or [phone]. Thx.EL

## 2024-01-02 DIAGNOSIS — E11.59 HYPERTENSION ASSOCIATED WITH DIABETES: ICD-10-CM

## 2024-01-02 DIAGNOSIS — I15.2 HYPERTENSION ASSOCIATED WITH DIABETES: ICD-10-CM

## 2024-01-02 NOTE — TELEPHONE ENCOUNTER
No care due was identified.  Health [facility] Embedded Care Due Messages. Reference number: [id number].   1/02/2024 2:18:19 PM CST

## 2024-01-03 RX ORDER — PIOGLITAZONEHYDROCHLORIDE 30 MG/1
30 TABLET ORAL DAILY
Qty: 90 TABLET | Refills: 1 | Status: SHIPPED | OUTPATIENT
Start: 2024-01-03 | End: 2024-07-01

## 2024-01-03 RX ORDER — AMLODIPINE BESYLATE 5 MG/1
5 TABLET ORAL DAILY
Qty: 90 TABLET | Refills: 1 | Status: SHIPPED | OUTPATIENT
Start: 2024-01-03

## 2024-01-03 RX ORDER — HYDROCHLOROTHIAZIDE 25 MG/1
25 TABLET ORAL DAILY
Qty: 90 TABLET | Refills: 1 | Status: SHIPPED | OUTPATIENT
Start: 2024-01-03 | End: 2024-07-01

## 2024-01-03 RX ORDER — PRAVASTATIN SODIUM 20 MG/1
20 TABLET ORAL DAILY
Qty: 90 TABLET | Refills: 1 | Status: SHIPPED | OUTPATIENT
Start: 2024-01-03

## 2024-01-03 RX ORDER — BENAZEPRIL HYDROCHLORIDE 40 MG/1
40 TABLET ORAL DAILY
Qty: 90 TABLET | Refills: 1 | Status: SHIPPED | OUTPATIENT
Start: 2024-01-03

## 2024-01-03 NOTE — TELEPHONE ENCOUNTER
Refill Decision Note   Misha Simpson  is requesting a refill authorization.  Brief Assessment and Rationale for Refill:  Approve     Medication Therapy Plan:         Comments:     Note composed:10:26 AM 01/03/2024             Appointments     Last Visit   4/11/2023 Trino Snyder MD   Next Visit   1/5/2024 Trino Snyder MD

## 2024-01-05 ENCOUNTER — OFFICE VISIT (OUTPATIENT)
Dept: FAMILY MEDICINE | Facility: CLINIC | Age: 69
End: 2024-01-05
Payer: COMMERCIAL

## 2024-01-05 VITALS
BODY MASS INDEX: 24.09 KG/M2 | HEART RATE: 65 BPM | OXYGEN SATURATION: 94 % | SYSTOLIC BLOOD PRESSURE: 146 MMHG | HEIGHT: 72 IN | DIASTOLIC BLOOD PRESSURE: 79 MMHG | WEIGHT: 177.88 LBS | TEMPERATURE: 98 F

## 2024-01-05 DIAGNOSIS — J47.1 BRONCHIECTASIS WITH ACUTE EXACERBATION: Primary | ICD-10-CM

## 2024-01-05 DIAGNOSIS — I15.2 HYPERTENSION ASSOCIATED WITH DIABETES: ICD-10-CM

## 2024-01-05 DIAGNOSIS — E11.65 TYPE 2 DIABETES MELLITUS WITH HYPERGLYCEMIA, UNSPECIFIED WHETHER LONG TERM INSULIN USE: ICD-10-CM

## 2024-01-05 DIAGNOSIS — E11.59 HYPERTENSION ASSOCIATED WITH DIABETES: ICD-10-CM

## 2024-01-05 PROCEDURE — 99214 OFFICE O/P EST MOD 30 MIN: CPT | Mod: S$GLB,,, | Performed by: FAMILY MEDICINE

## 2024-01-05 PROCEDURE — 3077F SYST BP >= 140 MM HG: CPT | Mod: CPTII,S$GLB,, | Performed by: FAMILY MEDICINE

## 2024-01-05 PROCEDURE — 99999 PR PBB SHADOW E&M-EST. PATIENT-LVL V: CPT | Mod: PBBFAC,,, | Performed by: FAMILY MEDICINE

## 2024-01-05 PROCEDURE — 3008F BODY MASS INDEX DOCD: CPT | Mod: CPTII,S$GLB,, | Performed by: FAMILY MEDICINE

## 2024-01-05 PROCEDURE — 1101F PT FALLS ASSESS-DOCD LE1/YR: CPT | Mod: CPTII,S$GLB,, | Performed by: FAMILY MEDICINE

## 2024-01-05 PROCEDURE — 3288F FALL RISK ASSESSMENT DOCD: CPT | Mod: CPTII,S$GLB,, | Performed by: FAMILY MEDICINE

## 2024-01-05 PROCEDURE — 3078F DIAST BP <80 MM HG: CPT | Mod: CPTII,S$GLB,, | Performed by: FAMILY MEDICINE

## 2024-01-05 PROCEDURE — 4010F ACE/ARB THERAPY RXD/TAKEN: CPT | Mod: CPTII,S$GLB,, | Performed by: FAMILY MEDICINE

## 2024-01-05 PROCEDURE — 3072F LOW RISK FOR RETINOPATHY: CPT | Mod: CPTII,S$GLB,, | Performed by: FAMILY MEDICINE

## 2024-01-05 PROCEDURE — 1159F MED LIST DOCD IN RCRD: CPT | Mod: CPTII,S$GLB,, | Performed by: FAMILY MEDICINE

## 2024-01-05 RX ORDER — INSULIN LISPRO 100 [IU]/ML
INJECTION, SOLUTION INTRAVENOUS; SUBCUTANEOUS
Qty: 15 ML | Refills: 5 | Status: SHIPPED | OUTPATIENT
Start: 2024-01-05 | End: 2024-02-15 | Stop reason: SDUPTHER

## 2024-01-05 RX ORDER — METHYLPREDNISOLONE 4 MG/1
TABLET ORAL
COMMUNITY
Start: 2023-10-10 | End: 2024-01-05 | Stop reason: ALTCHOICE

## 2024-01-05 RX ORDER — SODIUM CHLORIDE FOR INHALATION 3 %
4 VIAL, NEBULIZER (ML) INHALATION
COMMUNITY
Start: 2024-01-04

## 2024-01-05 RX ORDER — METFORMIN HYDROCHLORIDE 500 MG/1
TABLET, EXTENDED RELEASE ORAL
Qty: 180 TABLET | Refills: 1 | Status: SHIPPED | OUTPATIENT
Start: 2024-01-05 | End: 2024-01-19

## 2024-01-05 RX ORDER — LEVOFLOXACIN 750 MG/1
750 TABLET ORAL
COMMUNITY
Start: 2024-01-04 | End: 2024-01-11

## 2024-01-05 RX ORDER — PEN NEEDLE, DIABETIC 30 GX3/16"
NEEDLE, DISPOSABLE MISCELLANEOUS
Qty: 100 EACH | Refills: 3 | Status: SHIPPED | OUTPATIENT
Start: 2024-01-05

## 2024-01-05 RX ORDER — PREDNISONE 10 MG/1
TABLET ORAL
COMMUNITY
Start: 2024-01-04 | End: 2024-01-19

## 2024-01-05 RX ORDER — FLUTICASONE PROPIONATE 50 MCG
1 SPRAY, SUSPENSION (ML) NASAL DAILY
COMMUNITY

## 2024-01-06 NOTE — PROGRESS NOTES
"Patient with 12 days of sore throat congestion cough occasionally productive.  He saw his pulmonologist yesterday and started antibiotics and steroids.  He has had diabetes which is not currently well controlled.  Current treatment using pioglitazone alone.  Had been on metformin several years ago but stopped taking it and sugar seemed to be okay.  More recently however his glucose readings have been in the 200s.  Now in the 300 since he is on steroid taper.  Hypertension blood pressure is mildly elevated today.  States compliance with medication.      Misha was seen today for cough, nasal congestion, chest congestion and sore throat.    Diagnoses and all orders for this visit:    Bronchiectasis with acute exacerbation    Type 2 diabetes mellitus with hyperglycemia, unspecified whether long term insulin use  -     Ambulatory referral/consult to Diabetic Advanced Practice Providers (Medical Management); Future  -     Hemoglobin A1C; Future    Hypertension associated with diabetes    Other orders  -     metFORMIN (GLUCOPHAGE-XR) 500 MG ER 24hr tablet; Take 1 tablet (500 mg total) by mouth once daily for 7 days, THEN 2 tablets (1,000 mg total) once daily.  -     insulin lispro (HUMALOG KWIKPEN INSULIN) 100 unit/mL pen; Use before breakfast,lunch,supper. Glucose<160=0 unit,160-200=2 unit,201-250=4 unit,251-300=6 unit,301-350=8 unit, 351-400=10 unit,over 400=12 unit  -     pen needle, diabetic 32 gauge x 5/32" Ndle; Use once daily as directed    Continue pioglitazone.  Will have him follow-up in 2 weeks.  Recheck hemoglobin A1c 3 months.  Continue follow-up with his pulmonary doctor.            Diabetes Management Status    Statin: Taking  ACE/ARB: Taking    Screening or Prevention Patient's value Goal Complete/Controlled?   HgA1C Testing and Control   Lab Results   Component Value Date    HGBA1C 8.8 (H) 12/08/2023      Annually/Less than 8% No   Lipid profile : 12/08/2023 Annually Yes   LDL control Lab Results " "  Component Value Date    LDLCALC 70.4 12/08/2023    Annually/Less than 100 mg/dl  Yes   Nephropathy screening Lab Results   Component Value Date    LABMICR 5.0 12/08/2023     No results found for: "PROTEINUA" Annually Yes   Blood pressure BP Readings from Last 1 Encounters:   01/05/24 (!) 146/79    Less than 140/90 No   Dilated retinal exam : 11/08/2023 Annually Yes   Foot exam   : 01/05/2024 Annually Yes       Past Medical History:  Past Medical History:   Diagnosis Date    Adrenal adenoma 2008    bx benign    Arthritis     Bronchiectasis 05/15/2012    Cataract     COPD, mild 10/02/2017    COVID-19 05/23/2022    [facility] [address] [place]    Diabetes mellitus, type 2     Emphysema     Hemoptysis     Hyperlipidemia     Hyperplastic colon polyp     Hypertension     Lung nodules     small stable on serial ct    Lymphocytosis     reactive    Peptic ulcer disease     Pneumonia     Tobacco abuse 05/15/2012     Past Surgical History:   Procedure Laterality Date    APPENDECTOMY      BONE MARROW BIOPSY      BRONCHOSCOPY      COLONOSCOPY  08/06/2013         KNEE ARTHROSCOPY Left     MULTIPLE TOOTH EXTRACTIONS      NASAL FRACTURE SURGERY      WI CLOSED TX NASAL BONE FX W/MNPJ W/STABILIZATION     Review of patient's allergies indicates:   Allergen Reactions    Aspirin      Other reaction(s): Stomach upset  Other reaction(s): Nausea    Aspirin Other (See Comments)     Stomach problems and heart burn    Other reaction(s): Stomach upset Other reaction(s): Nausea    Hydrocodone Itching and Rash     Current Outpatient Medications on File Prior to Visit   Medication Sig Dispense Refill    acetaminophen (TYLENOL) 500 MG tablet Take 2 tablets (1,000 mg total) by mouth every 6 (six) hours as needed for Pain.      albuterol (PROVENTIL/VENTOLIN HFA) 90 mcg/actuation inhaler Inhale 2 puffs into the lungs every 6 (six) hours as needed.      ALBUTEROL INHL Inhale into the lungs as needed.       amLODIPine (NORVASC) 5 MG " tablet Take 1 tablet (5 mg total) by mouth once daily. 90 tablet 1    benazepriL (LOTENSIN) 40 MG tablet Take 1 tablet (40 mg total) by mouth once daily. 90 tablet 1    flash glucose sensor (FREESTYLE RAMA 2 SENSOR) Kit Uses to check Blood sugar once a day 1 kit 11    fluticasone propionate (FLONASE) 50 mcg/actuation nasal spray 1 spray by Each Nostril route once daily.      hydroCHLOROthiazide (HYDRODIURIL) 25 MG tablet Take 1 tablet (25 mg total) by mouth once daily. 90 tablet 1    levoFLOXacin (LEVAQUIN) 750 MG tablet Take 750 mg by mouth.      pioglitazone (ACTOS) 30 MG tablet Take 1 tablet (30 mg total) by mouth once daily. 90 tablet 1    pravastatin (PRAVACHOL) 20 MG tablet Take 1 tablet (20 mg total) by mouth once daily. 90 tablet 1    predniSONE (DELTASONE) 10 MG tablet Take 40 mg x 3 days, 30 mg x 3 days, 20 mg x 3 days, 10 mg x 3 days, then stop.      sodium chloride 3% 3 % nebulizer solution Inhale 4 mLs into the lungs.      VENTOLIN HFA 90 mcg/actuation inhaler INHALE TWO PUFFS EVERY 4 TO 6 HOURS AS NEEDED 18 g 2    [DISCONTINUED] methylPREDNISolone (MEDROL DOSEPACK) 4 mg tablet Take by mouth.      blood sugar diagnostic (BLOOD GLUCOSE TEST) Strp Test glucose 1 x daily (use insurance preferred brand) 50 strip 11    blood-glucose meter Misc Use as directed 1 each prn    lancets Misc As directed 100 each prn     No current facility-administered medications on file prior to visit.     Social History     Socioeconomic History    Marital status:    Tobacco Use    Smoking status: Some Days     Current packs/day: 0.00     Average packs/day: 0.3 packs/day for 53.0 years (13.3 ttl pk-yrs)     Types: Cigarettes, Pipe     Start date: 1965     Last attempt to quit: 2018     Years since quittin.0    Smokeless tobacco: Never    Tobacco comments:     1 pack every 3 days   Substance and Sexual Activity    Alcohol use: No     Alcohol/week: 1.0 standard drink of alcohol     Types: 1 Cans of beer  per week     Comment: 1 beer every 2 weeks    Drug use: No   Social History Narrative    ** Merged History Encounter **          Family History   Problem Relation Age of Onset    Kidney failure Father     Diabetes Father     Heart disease Mother 60    Diabetes Brother     Cancer Brother         prostate    Glaucoma Brother         ?    Cancer Brother     Amblyopia Neg Hx     Blindness Neg Hx     Cataracts Neg Hx     Hypertension Neg Hx     Macular degeneration Neg Hx     Retinal detachment Neg Hx     Strabismus Neg Hx     Stroke Neg Hx     Thyroid disease Neg Hx            ROS:  GENERAL: No fever, chills,  or significant weight changes.   CARDIOVASCULAR: Denies chest pain, PND, orthopnea or reduced exercise tolerance.  ABDOMEN: Appetite fine. Denies diarrhea, abdominal pain, hematemesis or blood in stool.  URINARY: No flank pain, dysuria or hematuria.      OBJECTIVE:   Vitals:    01/05/24 1108   BP: (!) 146/79   Pulse: 65   Temp: 97.7 °F (36.5 °C)   TempSrc: Temporal   SpO2: (!) 94%   Weight: 80.7 kg (177 lb 14.4 oz)   Height: 6' (1.829 m)     Wt Readings from Last 3 Encounters:   01/05/24 80.7 kg (177 lb 14.4 oz)   05/10/23 78.5 kg (173 lb)   04/11/23 78.5 kg (173 lb)       APPEARANCE: Well nourished, well developed, in no acute distress.   HEAD: Normocephalic. Atraumatic. No sinus tenderness.   EYES:   Right eye: Pupil reactive. Conjunctiva clear.   Left eye: Pupil reactive. Conjunctiva clear.   Both fundi: Grossly normal to nondilated exam. EOMI.   EARS: TM's intact. Light reflex normal. No retraction or perforation.   NOSE: clear.   MOUTH & THROAT: No pharyngeal erythema or exudate. No lesions.   NECK: No bruits. No JVD. No cervical lymphadenopathy. No thyromegaly.   CHEST: Breath sounds clear bilaterally. Normal respiratory effort   CARDIOVASCULAR: Normal rate. Regular rhythm. No murmurs. No rub. No gallops.   ABDOMEN: Bowel sounds normal. Soft. No tenderness. No organomegaly.   PERIPHERAL VASCULAR: No  cyanosis. No clubbing. No edema.   NEUROLOGIC: No focal findings.    Feet:Sensation in the feet intact to monofilament testing.   No significant foot lesions.  Onychomycosis changes noted.  Mild callus noted.  Pulses decreased.  MENTAL STATUS: Alert. Oriented x 3.

## 2024-01-09 ENCOUNTER — TELEPHONE (OUTPATIENT)
Dept: FAMILY MEDICINE | Facility: CLINIC | Age: 69
End: 2024-01-09
Payer: COMMERCIAL

## 2024-01-09 DIAGNOSIS — E11.9 TYPE 2 DIABETES MELLITUS WITHOUT COMPLICATION, UNSPECIFIED WHETHER LONG TERM INSULIN USE: Primary | ICD-10-CM

## 2024-01-09 NOTE — TELEPHONE ENCOUNTER
Yes, sliding scale 3 times daily before meals.  Check sugar before meal and take insulin if indicated on scale.

## 2024-01-09 NOTE — TELEPHONE ENCOUNTER
----- Message from Dhaval Finney sent at 1/9/2024 11:13 AM CST -----  Contact: Wife  Type: Needs Medical Advice  Who Called:  Wife  Symptoms (please be specific):    How long has patient had these symptoms:    Pharmacy name and phone #:    Best Call Back Number: [phone]     Additional Information: Needs clarification on when to take insulin.

## 2024-01-10 NOTE — TELEPHONE ENCOUNTER
----- Message from LatkadenPhoenix Energy TechnologiesStarr sent at 1/10/2024 10:02 AM CST -----  Type:  Patient Returning Call  Who Called:Misha  Who Left Message for Patient:Francoise Ac LPN  Does the patient know what this is regarding?:Yes  Would the patient rather a call back or a response via [facility]? Call back  Best Call Back Number:[phone]  Additional Information:

## 2024-01-10 NOTE — TELEPHONE ENCOUNTER
Pt was advised of Dr Chan response and recommendations. Pt verbalized understanding . He has ? About giving himself Insulin . Dr Chan nurse Francoise asked that he come in tomorrow afternoon and she will show him how to take his Insulin . Pt agreed to come in . He has an appt to see Dr CHAN ON THE 19TH AND WILL BRING IN HIS BLOOD SUAGAR READINGS WITH HIM .

## 2024-01-10 NOTE — TELEPHONE ENCOUNTER
Wife informed, she reports the SS is too difficult for them to work, asking for referral for diabetic education for assistance, last saw JEANETTE Mart in 2017, please advise

## 2024-01-10 NOTE — TELEPHONE ENCOUNTER
----- Message from Laurie Cox sent at 1/10/2024  9:17 AM CST -----  Contact: wife [phone]  Patients wife called back after missing a call from your office. Please call back [phone]

## 2024-01-10 NOTE — TELEPHONE ENCOUNTER
He is actually already scheduled with the endocrine nurse practitioner in February.  I put in referral for diabetes education nurse as well.  Please make sure that he is taking the metformin that started last visit.  Hopefully his sugar will come down with this as well and may not need to stay on insulin chronically.  Also will help once he is off the steroid that he had been on recently.  I can see him back clinic sometime next week or 2 if he wants to come in bring sugar readings.

## 2024-01-11 ENCOUNTER — TELEPHONE (OUTPATIENT)
Dept: FAMILY MEDICINE | Facility: CLINIC | Age: 69
End: 2024-01-11
Payer: COMMERCIAL

## 2024-01-11 NOTE — TELEPHONE ENCOUNTER
Spoke w/ pharmacist , she asked if we were aware that his Insulin needed a prior Auth ? We were not . We have started an electronic PA .

## 2024-01-11 NOTE — TELEPHONE ENCOUNTER
----- Message from Dirk Wheatley sent at 1/11/2024  4:14 PM CST -----  Contact: Mer[facility] ( [facility])  Merari ( [facility]) is requesting a call from nurse to discuss directions on the insulin lispro . Please call pt back at  [phone]

## 2024-01-17 ENCOUNTER — TELEPHONE (OUTPATIENT)
Dept: DIABETES | Facility: CLINIC | Age: 69
End: 2024-01-17
Payer: COMMERCIAL

## 2024-01-19 ENCOUNTER — TELEPHONE (OUTPATIENT)
Dept: FAMILY MEDICINE | Facility: CLINIC | Age: 69
End: 2024-01-19
Payer: COMMERCIAL

## 2024-01-19 ENCOUNTER — OFFICE VISIT (OUTPATIENT)
Dept: FAMILY MEDICINE | Facility: CLINIC | Age: 69
End: 2024-01-19
Payer: COMMERCIAL

## 2024-01-19 VITALS
BODY MASS INDEX: 23.86 KG/M2 | SYSTOLIC BLOOD PRESSURE: 138 MMHG | TEMPERATURE: 98 F | WEIGHT: 176.19 LBS | DIASTOLIC BLOOD PRESSURE: 85 MMHG | HEIGHT: 72 IN | HEART RATE: 52 BPM

## 2024-01-19 DIAGNOSIS — E78.2 COMBINED HYPERLIPIDEMIA ASSOCIATED WITH TYPE 2 DIABETES MELLITUS: ICD-10-CM

## 2024-01-19 DIAGNOSIS — E11.69 COMBINED HYPERLIPIDEMIA ASSOCIATED WITH TYPE 2 DIABETES MELLITUS: ICD-10-CM

## 2024-01-19 DIAGNOSIS — E11.59 HYPERTENSION ASSOCIATED WITH DIABETES: ICD-10-CM

## 2024-01-19 DIAGNOSIS — E11.65 TYPE 2 DIABETES MELLITUS WITH HYPERGLYCEMIA, UNSPECIFIED WHETHER LONG TERM INSULIN USE: Primary | ICD-10-CM

## 2024-01-19 DIAGNOSIS — I15.2 HYPERTENSION ASSOCIATED WITH DIABETES: ICD-10-CM

## 2024-01-19 PROCEDURE — 3079F DIAST BP 80-89 MM HG: CPT | Mod: CPTII,S$GLB,, | Performed by: FAMILY MEDICINE

## 2024-01-19 PROCEDURE — 3008F BODY MASS INDEX DOCD: CPT | Mod: CPTII,S$GLB,, | Performed by: FAMILY MEDICINE

## 2024-01-19 PROCEDURE — 3072F LOW RISK FOR RETINOPATHY: CPT | Mod: CPTII,S$GLB,, | Performed by: FAMILY MEDICINE

## 2024-01-19 PROCEDURE — 3288F FALL RISK ASSESSMENT DOCD: CPT | Mod: CPTII,S$GLB,, | Performed by: FAMILY MEDICINE

## 2024-01-19 PROCEDURE — 4010F ACE/ARB THERAPY RXD/TAKEN: CPT | Mod: CPTII,S$GLB,, | Performed by: FAMILY MEDICINE

## 2024-01-19 PROCEDURE — 1159F MED LIST DOCD IN RCRD: CPT | Mod: CPTII,S$GLB,, | Performed by: FAMILY MEDICINE

## 2024-01-19 PROCEDURE — 1101F PT FALLS ASSESS-DOCD LE1/YR: CPT | Mod: CPTII,S$GLB,, | Performed by: FAMILY MEDICINE

## 2024-01-19 PROCEDURE — 3075F SYST BP GE 130 - 139MM HG: CPT | Mod: CPTII,S$GLB,, | Performed by: FAMILY MEDICINE

## 2024-01-19 PROCEDURE — 99214 OFFICE O/P EST MOD 30 MIN: CPT | Mod: S$GLB,,, | Performed by: FAMILY MEDICINE

## 2024-01-19 PROCEDURE — 99999 PR PBB SHADOW E&M-EST. PATIENT-LVL IV: CPT | Mod: PBBFAC,,, | Performed by: FAMILY MEDICINE

## 2024-01-19 RX ORDER — METHOCARBAMOL 500 MG/1
TABLET, FILM COATED ORAL
COMMUNITY
Start: 2024-01-08

## 2024-01-19 RX ORDER — METFORMIN HYDROCHLORIDE 500 MG/1
TABLET, EXTENDED RELEASE ORAL
Qty: 270 TABLET | Refills: 1 | Status: SHIPPED | OUTPATIENT
Start: 2024-01-19 | End: 2024-02-15

## 2024-01-19 NOTE — TELEPHONE ENCOUNTER
----- Message from Sonia Siddiqui sent at 1/19/2024 10:02 AM CST -----  Type:  Needs Medical Advice    Who Called: pt wife    Would the patient rather a call back or a response via [facility]? Call back   Best Call Back Number: [phone]  Additional Information: pt was seen in the office on 01/19/24 and the pt needs to get a excuse for work sent by fax to [phone]

## 2024-01-20 NOTE — PROGRESS NOTES
"Patient presents follow-up diabetes.  Started using sliding scale insulin just in the past week or so.  Tolerating metformin recently started.  Home glucose improving, but elevated in the morning.  He did complete steroids he was on previously.  Pending follow-up appointment with endocrine nurse practitioner.  No hypoglycemia.  Blood pressure acceptable.              Misha was seen today for annual exam.    Diagnoses and all orders for this visit:    Type 2 diabetes mellitus with hyperglycemia, unspecified whether long term insulin use    Combined hyperlipidemia associated with type 2 diabetes mellitus    Hypertension associated with diabetes    Other orders  -     metFORMIN (GLUCOPHAGE-XR) 500 MG ER 24hr tablet; Take 1 tablet (500 mg total) by mouth every morning AND 2 tablets (1,000 mg total) daily with dinner or evening meal.    Increase metformin as above.  Continue with sliding scale insulin for now.  Keep scheduled appointment with the endocrine nurse practitioner.  Continue current antihypertensive therapy.  Follow-up laboratory scheduled April            Diabetes Management Status    Statin: Taking  ACE/ARB: Taking    Screening or Prevention Patient's value Goal Complete/Controlled?   HgA1C Testing and Control   Lab Results   Component Value Date    HGBA1C 8.8 (H) 12/08/2023      Annually/Less than 8% No   Lipid profile : 12/08/2023 Annually Yes   LDL control Lab Results   Component Value Date    LDLCALC 70.4 12/08/2023    Annually/Less than 100 mg/dl  Yes   Nephropathy screening Lab Results   Component Value Date    LABMICR 5.0 12/08/2023     No results found for: "PROTEINUA" Annually Yes   Blood pressure BP Readings from Last 1 Encounters:   01/19/24 138/85    Less than 140/90 Yes   Dilated retinal exam : 11/08/2023 Annually Yes   Foot exam   : 01/05/2024 Annually Yes       Past Medical History:  Past Medical History:   Diagnosis Date    Adrenal adenoma 2008    bx benign    Arthritis     Bronchiectasis " 05/15/2012    Cataract     COPD, mild 10/02/2017    COVID-19 05/23/2022    [facility] [address] [place] [place]    Diabetes mellitus, type 2     Emphysema     Hemoptysis     Hyperlipidemia     Hyperplastic colon polyp     Hypertension     Lung nodules     small stable on serial ct    Lymphocytosis     reactive    Peptic ulcer disease     Pneumonia     Tobacco abuse 05/15/2012     Past Surgical History:   Procedure Laterality Date    APPENDECTOMY      BONE MARROW BIOPSY      BRONCHOSCOPY      COLONOSCOPY  08/06/2013         KNEE ARTHROSCOPY Left     MULTIPLE TOOTH EXTRACTIONS      NASAL FRACTURE SURGERY      OH CLOSED TX NASAL BONE FX W/MNPJ W/STABILIZATION     Review of patient's allergies indicates:   Allergen Reactions    Aspirin      Other reaction(s): Stomach upset  Other reaction(s): Nausea    Aspirin Other (See Comments)     Stomach problems and heart burn    Other reaction(s): Stomach upset Other reaction(s): Nausea    Hydrocodone Itching and Rash     Current Outpatient Medications on File Prior to Visit   Medication Sig Dispense Refill    acetaminophen (TYLENOL) 500 MG tablet Take 2 tablets (1,000 mg total) by mouth every 6 (six) hours as needed for Pain.      albuterol (PROVENTIL/VENTOLIN HFA) 90 mcg/actuation inhaler Inhale 2 puffs into the lungs every 6 (six) hours as needed.      ALBUTEROL INHL Inhale into the lungs as needed.       amLODIPine (NORVASC) 5 MG tablet Take 1 tablet (5 mg total) by mouth once daily. 90 tablet 1    benazepriL (LOTENSIN) 40 MG tablet Take 1 tablet (40 mg total) by mouth once daily. 90 tablet 1    fluticasone propionate (FLONASE) 50 mcg/actuation nasal spray 1 spray by Each Nostril route once daily.      hydroCHLOROthiazide (HYDRODIURIL) 25 MG tablet Take 1 tablet (25 mg total) by mouth once daily. 90 tablet 1    insulin lispro (HUMALOG KWIKPEN INSULIN) 100 unit/mL pen Use before breakfast,lunch,supper. Glucose<160=0 unit,160-200=2 unit,201-250=4 unit,251-300=6  "unit,301-350=8 unit, 351-400=10 unit,over 400=12 unit 15 mL 5    pioglitazone (ACTOS) 30 MG tablet Take 1 tablet (30 mg total) by mouth once daily. 90 tablet 1    pravastatin (PRAVACHOL) 20 MG tablet Take 1 tablet (20 mg total) by mouth once daily. 90 tablet 1    sodium chloride 3% 3 % nebulizer solution Inhale 4 mLs into the lungs.      VENTOLIN HFA 90 mcg/actuation inhaler INHALE TWO PUFFS EVERY 4 TO 6 HOURS AS NEEDED 18 g 2    [DISCONTINUED] metFORMIN (GLUCOPHAGE-XR) 500 MG ER 24hr tablet Take 1 tablet (500 mg total) by mouth once daily for 7 days, THEN 2 tablets (1,000 mg total) once daily. 180 tablet 1    blood sugar diagnostic (BLOOD GLUCOSE TEST) Strp Test glucose 1 x daily (use insurance preferred brand) (Patient not taking: Reported on 2024) 50 strip 11    blood-glucose meter Misc Use as directed 1 each prn    flash glucose sensor (FREESTYLE RAMA 2 SENSOR) Kit Uses to check Blood sugar once a day 1 kit 11    lancets Misc As directed 100 each prn    methocarbamoL (ROBAXIN) 500 MG Tab 1-2 po bid prn spasm      pen needle, diabetic 32 gauge x " Ndle Use once daily as directed 100 each 3    [DISCONTINUED] predniSONE (DELTASONE) 10 MG tablet Take 40 mg x 3 days, 30 mg x 3 days, 20 mg x 3 days, 10 mg x 3 days, then stop.       No current facility-administered medications on file prior to visit.     Social History     Socioeconomic History    Marital status:    Tobacco Use    Smoking status: Some Days     Current packs/day: 0.00     Average packs/day: 0.3 packs/day for 53.0 years (13.3 ttl pk-yrs)     Types: Cigarettes, Pipe     Start date: 1965     Last attempt to quit: 2018     Years since quittin.1    Smokeless tobacco: Never    Tobacco comments:     1 pack every 3 days   Substance and Sexual Activity    Alcohol use: No     Alcohol/week: 1.0 standard drink of alcohol     Types: 1 Cans of beer per week     Comment: 1 beer every 2 weeks    Drug use: No   Social History Narrative "    ** Merged History Encounter **          Family History   Problem Relation Age of Onset    Kidney failure Father     Diabetes Father     Heart disease Mother 60    Diabetes Brother     Cancer Brother         prostate    Glaucoma Brother         ?    Cancer Brother     Amblyopia Neg Hx     Blindness Neg Hx     Cataracts Neg Hx     Hypertension Neg Hx     Macular degeneration Neg Hx     Retinal detachment Neg Hx     Strabismus Neg Hx     Stroke Neg Hx     Thyroid disease Neg Hx            ROS:  GENERAL: No fever, chills,  or significant weight changes.   CARDIOVASCULAR: Denies chest pain, PND, orthopnea or reduced exercise tolerance.  ABDOMEN: Appetite fine. Denies diarrhea, abdominal pain, hematemesis or blood in stool.  URINARY: No flank pain, dysuria or hematuria.    Vitals:    01/19/24 0815   BP: 138/85   Pulse: (!) 52   Temp: 97.5 °F (36.4 °C)   TempSrc: Temporal   Weight: 79.9 kg (176 lb 3.2 oz)   Height: 6' (1.829 m)       Wt Readings from Last 3 Encounters:   01/19/24 79.9 kg (176 lb 3.2 oz)   01/05/24 80.7 kg (177 lb 14.4 oz)   05/10/23 78.5 kg (173 lb)       APPEARANCE: Well nourished, well developed, in no acute distress.    HEAD: Normocephalic.  Atraumatic.  EYES:   Right eye: Pupil reactive.  Conjunctiva clear.    Left eye: Pupil reactive.  Conjunctiva clear.    NECK: Supple. No bruits.  No JVD.  No cervical lymphadenopathy.  No thyromegaly.    CHEST: Breath sounds clear bilaterally.  Normal respiratory effort  CARDIOVASCULAR: Normal rate.  Regular rhythm.  No murmurs.  No rub.  No gallops.   No edema.  MENTAL STATUS: Alert.  Oriented x 3.

## 2024-02-15 ENCOUNTER — OFFICE VISIT (OUTPATIENT)
Dept: DIABETES | Facility: CLINIC | Age: 69
End: 2024-02-15
Payer: COMMERCIAL

## 2024-02-15 VITALS
HEIGHT: 72 IN | WEIGHT: 179.69 LBS | DIASTOLIC BLOOD PRESSURE: 82 MMHG | SYSTOLIC BLOOD PRESSURE: 134 MMHG | HEART RATE: 59 BPM | BODY MASS INDEX: 24.34 KG/M2

## 2024-02-15 DIAGNOSIS — I15.2 HYPERTENSION ASSOCIATED WITH DIABETES: ICD-10-CM

## 2024-02-15 DIAGNOSIS — E11.59 HYPERTENSION ASSOCIATED WITH DIABETES: ICD-10-CM

## 2024-02-15 DIAGNOSIS — E11.69 COMBINED HYPERLIPIDEMIA ASSOCIATED WITH TYPE 2 DIABETES MELLITUS: ICD-10-CM

## 2024-02-15 DIAGNOSIS — E78.2 COMBINED HYPERLIPIDEMIA ASSOCIATED WITH TYPE 2 DIABETES MELLITUS: ICD-10-CM

## 2024-02-15 DIAGNOSIS — E11.65 TYPE 2 DIABETES MELLITUS WITH HYPERGLYCEMIA, UNSPECIFIED WHETHER LONG TERM INSULIN USE: Primary | ICD-10-CM

## 2024-02-15 DIAGNOSIS — E11.9 TYPE 2 DIABETES MELLITUS WITHOUT COMPLICATION, WITHOUT LONG-TERM CURRENT USE OF INSULIN: ICD-10-CM

## 2024-02-15 LAB — GLUCOSE SERPL-MCNC: 118 MG/DL (ref 70–110)

## 2024-02-15 PROCEDURE — 3288F FALL RISK ASSESSMENT DOCD: CPT | Mod: CPTII,S$GLB,, | Performed by: NURSE PRACTITIONER

## 2024-02-15 PROCEDURE — 99999 PR PBB SHADOW E&M-EST. PATIENT-LVL V: CPT | Mod: PBBFAC,,, | Performed by: NURSE PRACTITIONER

## 2024-02-15 PROCEDURE — 3008F BODY MASS INDEX DOCD: CPT | Mod: CPTII,S$GLB,, | Performed by: NURSE PRACTITIONER

## 2024-02-15 PROCEDURE — 1101F PT FALLS ASSESS-DOCD LE1/YR: CPT | Mod: CPTII,S$GLB,, | Performed by: NURSE PRACTITIONER

## 2024-02-15 PROCEDURE — 1126F AMNT PAIN NOTED NONE PRSNT: CPT | Mod: CPTII,S$GLB,, | Performed by: NURSE PRACTITIONER

## 2024-02-15 PROCEDURE — 4010F ACE/ARB THERAPY RXD/TAKEN: CPT | Mod: CPTII,S$GLB,, | Performed by: NURSE PRACTITIONER

## 2024-02-15 PROCEDURE — 3075F SYST BP GE 130 - 139MM HG: CPT | Mod: CPTII,S$GLB,, | Performed by: NURSE PRACTITIONER

## 2024-02-15 PROCEDURE — 3072F LOW RISK FOR RETINOPATHY: CPT | Mod: CPTII,S$GLB,, | Performed by: NURSE PRACTITIONER

## 2024-02-15 PROCEDURE — 1159F MED LIST DOCD IN RCRD: CPT | Mod: CPTII,S$GLB,, | Performed by: NURSE PRACTITIONER

## 2024-02-15 PROCEDURE — 95251 CONT GLUC MNTR ANALYSIS I&R: CPT | Mod: S$GLB,,, | Performed by: NURSE PRACTITIONER

## 2024-02-15 PROCEDURE — 3079F DIAST BP 80-89 MM HG: CPT | Mod: CPTII,S$GLB,, | Performed by: NURSE PRACTITIONER

## 2024-02-15 PROCEDURE — 82962 GLUCOSE BLOOD TEST: CPT | Mod: S$GLB,,, | Performed by: NURSE PRACTITIONER

## 2024-02-15 PROCEDURE — 99204 OFFICE O/P NEW MOD 45 MIN: CPT | Mod: S$GLB,,, | Performed by: NURSE PRACTITIONER

## 2024-02-15 RX ORDER — EMPAGLIFLOZIN, METFORMIN HYDROCHLORIDE 10; 1000 MG/1; MG/1
1 TABLET, EXTENDED RELEASE ORAL DAILY
Qty: 30 TABLET | Refills: 1 | Status: SHIPPED | OUTPATIENT
Start: 2024-02-15 | End: 2024-04-18 | Stop reason: SDUPTHER

## 2024-02-15 RX ORDER — INSULIN ASPART 100 [IU]/ML
4-8 INJECTION, SOLUTION INTRAVENOUS; SUBCUTANEOUS
Qty: 7.2 ML | Refills: 11 | Status: SHIPPED | OUTPATIENT
Start: 2024-02-15 | End: 2024-06-06

## 2024-02-15 RX ORDER — INSULIN LISPRO 100 [IU]/ML
INJECTION, SOLUTION INTRAVENOUS; SUBCUTANEOUS
Qty: 15 ML | Refills: 5 | Status: SHIPPED | OUTPATIENT
Start: 2024-02-15 | End: 2024-02-15

## 2024-02-15 NOTE — PROGRESS NOTES
Misha Simpson is a 68 y.o. male who  has a past medical history of Adrenal adenoma (2008), Arthritis, Bronchiectasis (05/15/2012), Cataract, COPD, mild (10/02/2017), COVID-19 (05/23/2022), Diabetes mellitus, type 2, Emphysema, Hemoptysis, Hyperlipidemia, Hyperplastic colon polyp, Hypertension, Lung nodules, Lymphocytosis, Peptic ulcer disease, Pneumonia, and Tobacco abuse (05/15/2012)., who present for an initial evaluation of Type 2 diabetes mellitus.     CHIEF COMPLAINT: Diabetes Consultation    PCP: Trino Snyder MD     The patient was initially diagnosed with diabetes in 2002.     Previous failed treatments include:  NONE    Social Documentation:  Patient lives in [place].   Occupation: .   Exercise: no formal exercise.     Current monitoring regimen:  Katheryn 2 CGM - Sharing    The patient's [employer] CGM was downloaded and reviewed. For the past 14 days, patient average glucose was 154 mg/dL. He was above range 28% of the time, in range 72% of the time, and below range 0% of the time. The target range for this patient was 70 - 180 mg/dL. Overall, there was a pattern of mild post prandial hyperglycemia, then hypoglycemia. He is giving humalog correction after meals.        Current Diabetes related symptoms/problems include hyperglycemia, hypoglycemia , and paresthesia of the feet and have been unchanged.     Diabetes related complications:   none.   denies Pancreatitis  denies Gastroparesis  denies DKA  denies Hx/family Hx of MEN2/MTC  denies Frequent UTIs/yeast infections    Cardiovascular Risk Factors: advanced age (>55 for men, >65 for women), dyslipidemia, hypertension, male gender, and smoking/tobacco exposure.    Patient currently taking insulin or sulfonylurea?  Yes. Emergency Glucagon Prescription current? no    Any episodes of hypoglycemia? Yes. Hypoglycemia treatment reviewed with patient and education to be provided in AVS.   Hypoglycemia Unawareness? No  Severe  "hypoglycemia requiring 3rd party? No    Last seen by Diabetes Education: none in last year.    Diabetes Medications               insulin lispro (HUMALOG KWIKPEN INSULIN) 100 unit/mL pen Use before breakfast,lunch,supper. Glucose<160=0 unit,160-200=2 unit,201-250=4 unit,251-300=6 unit,301-350=8 unit, 351-400=10 unit,over 400=12 unit    metFORMIN (GLUCOPHAGE-XR) 500 MG ER 24hr tablet Take 1 tablet (500 mg total) by mouth every morning AND 2 tablets (1,000 mg total) daily with dinner or evening meal.    pioglitazone (ACTOS) 30 MG tablet Take 1 tablet (30 mg total) by mouth once daily.       DIABETES DISEASE MANAGEMENT STATUS  Statin: Taking  ACE/ARB: Taking  Screening or Prevention Patient's value Goal Complete/Controlled?   HgA1C Testing and Control   Lab Results   Component Value Date    HGBA1C 8.8 (H) 12/08/2023      Annually/Less than 8% No   Lipid profile : 12/08/2023 Annually Yes   LDL control Lab Results   Component Value Date    LDLCALC 70.4 12/08/2023    Annually/Less than 100 mg/dl  Yes   Nephropathy screening Lab Results   Component Value Date    LABMICR 5.0 12/08/2023     No results found for: "PROTEINUA"  No results found for: "UTPCR"   Annually Yes   Blood pressure BP Readings from Last 1 Encounters:   02/15/24 134/82    Less than 140/90 No   Dilated retinal exam : 11/08/2023 Annually Yes   Foot exam   : 01/05/2024 Annually Yes   Patient's medications, allergies, past medical, surgical, social and family histories were reviewed and updated as appropriate.     Review of Systems   Constitutional:  Negative for weight loss.   Eyes:  Negative for blurred vision and double vision.   Cardiovascular:  Negative for chest pain.   Gastrointestinal:  Negative for nausea and vomiting.   Genitourinary:  Negative for frequency.   Musculoskeletal:  Negative for falls.   Neurological:  Negative for dizziness and weakness.   Endo/Heme/Allergies:  Negative for polydipsia.   Psychiatric/Behavioral:  Negative for " "depression.    All other systems reviewed and are negative.       Physical Exam  Constitutional:       General: He is not in acute distress.     Appearance: Normal appearance.   Eyes:      Extraocular Movements: Extraocular movements intact.      Pupils: Pupils are equal, round, and reactive to light.   Cardiovascular:      Rate and Rhythm: Normal rate and regular rhythm.      Heart sounds: Normal heart sounds.   Pulmonary:      Effort: Pulmonary effort is normal. No respiratory distress.      Breath sounds: Normal breath sounds.   Musculoskeletal:      Cervical back: Normal range of motion and neck supple.   Neurological:      Mental Status: He is alert and oriented to person, place, and time.   Psychiatric:         Mood and Affect: Mood normal.         Behavior: Behavior normal.          Blood pressure 134/82, pulse (!) 59, height 6' (1.829 m), weight 81.5 kg (179 lb 11.2 oz).  Wt Readings from Last 3 Encounters:   02/15/24 81.5 kg (179 lb 11.2 oz)   01/19/24 79.9 kg (176 lb 3.2 oz)   01/05/24 80.7 kg (177 lb 14.4 oz)       LAB REVIEW  Lab Results   Component Value Date     12/08/2023    K 4.2 12/08/2023     12/08/2023    CO2 22 (L) 12/08/2023    BUN 9 12/08/2023    CREATININE 1.1 12/08/2023    CALCIUM 9.4 12/08/2023    ANIONGAP 12 12/08/2023    EGFRNORACEVR >60.0 12/08/2023     No results found for: "CPEPTIDE", "GLUTAMICACID", "INSLNABS"  Hemoglobin A1C   Date Value Ref Range Status   12/08/2023 8.8 (H) 4.0 - 5.6 % Final     Comment:     ADA Screening Guidelines:  5.7-6.4%  Consistent with prediabetes  >or=6.5%  Consistent with diabetes    High levels of fetal hemoglobin interfere with the HbA1C  assay. Heterozygous hemoglobin variants (HbS, HgC, etc)do  not significantly interfere with this assay.   However, presence of multiple variants may affect accuracy.     06/29/2023 7.2 (H) 4.0 - 5.6 % Final     Comment:     ADA Screening Guidelines:  5.7-6.4%  Consistent with prediabetes  >or=6.5%  Consistent " with diabetes    High levels of fetal hemoglobin interfere with the HbA1C  assay. Heterozygous hemoglobin variants (HbS, HgC, etc)do  not significantly interfere with this assay.   However, presence of multiple variants may affect accuracy.     03/01/2023 6.7 (H) 4.0 - 5.6 % Final     Comment:     ADA Screening Guidelines:  5.7-6.4%  Consistent with prediabetes  >or=6.5%  Consistent with diabetes    High levels of fetal hemoglobin interfere with the HbA1C  assay. Heterozygous hemoglobin variants (HbS, HgC, etc)do  not significantly interfere with this assay.   However, presence of multiple variants may affect accuracy.         Lab Results   Component Value Date    POCGLU 118 (A) 02/15/2024       ASSESSMENT    ICD-10-CM ICD-9-CM   1. Type 2 diabetes mellitus with hyperglycemia, unspecified whether long term insulin use  E11.65 250.00   2. Type 2 diabetes mellitus without complication, without long-term current use of insulin  E11.9 250.00   3. Combined hyperlipidemia associated with type 2 diabetes mellitus  E11.69 250.80    E78.2 272.2   4. Hypertension associated with diabetes  E11.59 250.80    I15.2 401.9        PLAN  Diagnoses and all orders for this visit:    Type 2 diabetes mellitus with hyperglycemia, unspecified whether long term insulin use  -     Ambulatory referral/consult to Diabetic Advanced Practice Providers (Medical Management)  -     POCT Glucose, Hand-Held Device  -     empagliflozin-metformin (SYNJARDY XR) 10-1,000 mg TBph; Take 1 tablet by mouth once daily.  -     insulin lispro (HUMALOG KWIKPEN INSULIN) 100 unit/mL pen; Use before breakfast,lunch,supper. Glucose<160=0 unit,160-200=2 unit,201-250=4 unit,251-300=6 unit,301-350=8 unit, 351-400=10 unit,over 400=12 unit    Type 2 diabetes mellitus without complication, without long-term current use of insulin    Combined hyperlipidemia associated with type 2 diabetes mellitus    Hypertension associated with diabetes        Reviewed pathophysiology of  diabetes, complications related to the disease, importance of annual dilated eye exam and daily foot examination. Explained MOA, SE, dosage of medications. Written instructions given and reviewed with patient and patient verbalizes understanding. Discussed importance of A1c less than 7% to reduce risk of micro and macro complications, including nephropathy, neuropathy, retinopathy, heart attack and stroke. Reviewed the importance of controlled Blood Pressure and Cholesterol Lab Values in preventing disease.     PATIENT INSTRUCTIONS    Discontinue Metformin.   Start Synjardy  mg by mouth daily.   Continue Actos 30 mg by mouth daily.   Decrease Humalog three times daily BEFORE meals    If  - 250, may take 4 units of Humalog  If  - 300, may take 5 units of Humalog   If  - 350, may take 6 units of Humalog  If  - 400, may take 7 units of Humalog  If +, may take 8 units of Humalog     Continue Katheryn 2 CGM  Blood Sugar Goals:       Fastin-130.       1-2 hours after a meal: Less than 180.       Follow up in about 2 months (around 4/15/2024) for In-Person, Katheryn - Sharing.    Portions of this note were prepared with [employer] voice recognition transcription software. Grammatical errors, including garbled syntax, mangle pronouns, and other bizarre constructions may be attributed to that software system.

## 2024-02-15 NOTE — PATIENT INSTRUCTIONS
PATIENT INSTRUCTIONS    Discontinue Metformin.   Start Synjardy  mg by mouth daily.   Continue Actos 30 mg by mouth daily.   Decrease Humalog three times daily BEFORE meals    If  - 250, may take 4 units of Humalog  If  - 300, may take 5 units of Humalog   If  - 350, may take 6 units of Humalog  If  - 400, may take 7 units of Humalog  If +, may take 8 units of Humalog     Continue Katheryn 2 CGM  Blood Sugar Goals:       Fastin-130.       1-2 hours after a meal: Less than 180.

## 2024-03-13 ENCOUNTER — TELEPHONE (OUTPATIENT)
Dept: PHARMACY | Facility: CLINIC | Age: 69
End: 2024-03-13
Payer: COMMERCIAL

## 2024-04-05 ENCOUNTER — LAB VISIT (OUTPATIENT)
Dept: LAB | Facility: HOSPITAL | Age: 69
End: 2024-04-05
Attending: FAMILY MEDICINE
Payer: COMMERCIAL

## 2024-04-05 DIAGNOSIS — E11.65 TYPE 2 DIABETES MELLITUS WITH HYPERGLYCEMIA, UNSPECIFIED WHETHER LONG TERM INSULIN USE: ICD-10-CM

## 2024-04-05 LAB
ESTIMATED AVG GLUCOSE: 154 MG/DL (ref 68–131)
HBA1C MFR BLD: 7 % (ref 4–5.6)

## 2024-04-05 PROCEDURE — 83036 HEMOGLOBIN GLYCOSYLATED A1C: CPT | Performed by: FAMILY MEDICINE

## 2024-04-05 PROCEDURE — 36415 COLL VENOUS BLD VENIPUNCTURE: CPT | Mod: PO | Performed by: FAMILY MEDICINE

## 2024-04-18 ENCOUNTER — OFFICE VISIT (OUTPATIENT)
Dept: DIABETES | Facility: CLINIC | Age: 69
End: 2024-04-18
Payer: COMMERCIAL

## 2024-04-18 VITALS
HEIGHT: 72 IN | WEIGHT: 176.5 LBS | HEART RATE: 49 BPM | BODY MASS INDEX: 23.91 KG/M2 | DIASTOLIC BLOOD PRESSURE: 88 MMHG | SYSTOLIC BLOOD PRESSURE: 163 MMHG

## 2024-04-18 DIAGNOSIS — E11.65 TYPE 2 DIABETES MELLITUS WITH HYPERGLYCEMIA, UNSPECIFIED WHETHER LONG TERM INSULIN USE: Primary | ICD-10-CM

## 2024-04-18 LAB — GLUCOSE SERPL-MCNC: 92 MG/DL (ref 70–110)

## 2024-04-18 PROCEDURE — 3072F LOW RISK FOR RETINOPATHY: CPT | Mod: CPTII,S$GLB,, | Performed by: NURSE PRACTITIONER

## 2024-04-18 PROCEDURE — 82962 GLUCOSE BLOOD TEST: CPT | Mod: S$GLB,,, | Performed by: NURSE PRACTITIONER

## 2024-04-18 PROCEDURE — 3008F BODY MASS INDEX DOCD: CPT | Mod: CPTII,S$GLB,, | Performed by: NURSE PRACTITIONER

## 2024-04-18 PROCEDURE — 95251 CONT GLUC MNTR ANALYSIS I&R: CPT | Mod: S$GLB,,, | Performed by: NURSE PRACTITIONER

## 2024-04-18 PROCEDURE — 99999 PR PBB SHADOW E&M-EST. PATIENT-LVL IV: CPT | Mod: PBBFAC,,, | Performed by: NURSE PRACTITIONER

## 2024-04-18 PROCEDURE — 3051F HG A1C>EQUAL 7.0%<8.0%: CPT | Mod: CPTII,S$GLB,, | Performed by: NURSE PRACTITIONER

## 2024-04-18 PROCEDURE — 1126F AMNT PAIN NOTED NONE PRSNT: CPT | Mod: CPTII,S$GLB,, | Performed by: NURSE PRACTITIONER

## 2024-04-18 PROCEDURE — 1101F PT FALLS ASSESS-DOCD LE1/YR: CPT | Mod: CPTII,S$GLB,, | Performed by: NURSE PRACTITIONER

## 2024-04-18 PROCEDURE — 3288F FALL RISK ASSESSMENT DOCD: CPT | Mod: CPTII,S$GLB,, | Performed by: NURSE PRACTITIONER

## 2024-04-18 PROCEDURE — 3077F SYST BP >= 140 MM HG: CPT | Mod: CPTII,S$GLB,, | Performed by: NURSE PRACTITIONER

## 2024-04-18 PROCEDURE — 99214 OFFICE O/P EST MOD 30 MIN: CPT | Mod: S$GLB,,, | Performed by: NURSE PRACTITIONER

## 2024-04-18 PROCEDURE — 1159F MED LIST DOCD IN RCRD: CPT | Mod: CPTII,S$GLB,, | Performed by: NURSE PRACTITIONER

## 2024-04-18 PROCEDURE — 3079F DIAST BP 80-89 MM HG: CPT | Mod: CPTII,S$GLB,, | Performed by: NURSE PRACTITIONER

## 2024-04-18 PROCEDURE — 4010F ACE/ARB THERAPY RXD/TAKEN: CPT | Mod: CPTII,S$GLB,, | Performed by: NURSE PRACTITIONER

## 2024-04-18 RX ORDER — EMPAGLIFLOZIN, METFORMIN HYDROCHLORIDE 10; 1000 MG/1; MG/1
1 TABLET, EXTENDED RELEASE ORAL DAILY
Qty: 30 TABLET | Refills: 11 | Status: SHIPPED | OUTPATIENT
Start: 2024-04-18 | End: 2024-06-05

## 2024-04-18 NOTE — PATIENT INSTRUCTIONS
PATIENT INSTRUCTIONS     Discontinue Synjardy due to cost.   Start Xigduo  mg by mouth daily.   [facility] -  Call [phone] to schedule appointment with Deirdre Llanes NP, for prescription refills of Xigduo. Each prescription is $8 for 90 day supply. You will need to be seen once a year by a provider at [facility] to continue receiving prescription refills.    [facility]   [id number] [address] [address]     Continue Actos 30 mg by mouth daily.   Decrease Humalog three times daily BEFORE meals     If  - 250, may take 4 units of Humalog  If  - 300, may take 5 units of Humalog   If  - 350, may take 6 units of Humalog  If  - 400, may take 7 units of Humalog  If +, may take 8 units of Humalog      Continue Katheryn 2 CGM  Blood Sugar Goals:       Fastin-130.       1-2 hours after a meal: Less than 180.

## 2024-04-18 NOTE — LETTER
April 18, 2024      [facility]  [address] [address]  Phone: [phone]  Fax: [phone]       Patient: Misha Simpson   YOB: 1955  Date of Visit: 04/18/2024    To Whom It May Concern:    Janet Simpson  was at [facility] on 04/18/2024. The patient may return to work/school on 04/22/2024 with no restrictions. If you have any questions or concerns, or if I can be of further assistance, please do not hesitate to contact me.    Sincerely,    Osei Lira MA

## 2024-04-18 NOTE — PROGRESS NOTES
Misha Simpson is a 68 y.o. male who  has a past medical history of Adrenal adenoma (2008), Arthritis, Bronchiectasis (05/15/2012), Cataract, COPD, mild (10/02/2017), COVID-19 (05/23/2022), Diabetes mellitus, type 2, Emphysema, Hemoptysis, Hyperlipidemia, Hyperplastic colon polyp, Hypertension, Lung nodules, Lymphocytosis, Peptic ulcer disease, Pneumonia, and Tobacco abuse (05/15/2012). and presents for a follow up evaluation of Type 2 diabetes mellitus.     CHIEF COMPLAINT: Diabetes Consultation    PCP: Trino Snyder MD     Initial visit with me - 2/15/2024    The patient was initially diagnosed with diabetes in 2002.      Previous failed treatments include:  NONE     Social Documentation:  Patient lives in [place].   Occupation: .   Exercise: no formal exercise.      Diabetes related complications:   none.   denies Pancreatitis  denies Gastroparesis  denies DKA  denies Hx/family Hx of MEN2/MTC  denies Frequent UTIs/yeast infections     Cardiovascular Risk Factors: advanced age (>55 for men, >65 for women), dyslipidemia, hypertension, male gender, and smoking/tobacco exposure.    Diabetes Medications               insulin aspart U-100 (NOVOLOG FLEXPEN U-100 INSULIN) 100 unit/mL (3 mL) InPn pen Inject 4-8 Units into the skin 3 (three) times daily with meals. USING CORRECTION SCALE    pioglitazone (ACTOS) 30 MG tablet Take 1 tablet (30 mg total) by mouth once daily.     Current monitoring regimen:  Katheryn 2 CGM     The patient's [employer] Egg[employer] CGM was downloaded and reviewed. For the past 14 days, patient average glucose was 133 mg/dL. He was above range 15% of the time, in range 85% of the time, and below range 0% of the time. The target range for this patient was 70 - 180 mg/dL. Overall, there was a pattern of euglycemia, mild post prandial hyperglycemia..        Patient currently taking insulin or sulfonylurea?  Yes. Emergency Glucagon Prescription current? no  Recent hypoglycemic episodes:  "No.     Patient compliant with glucose checks and medication administration? Yes    DIABETES MANAGEMENT STATUS  Statin: Taking  ACE/ARB: Taking  Screening or Prevention Patient's value Goal Complete/Controlled?   HgA1C Testing and Control   Lab Results   Component Value Date    HGBA1C 7.0 (H) 04/05/2024      Annually/Less than 8% Yes   Lipid profile : 12/08/2023 Annually Yes   LDL control Lab Results   Component Value Date    LDLCALC 70.4 12/08/2023    Annually/Less than 100 mg/dl  Yes   Nephropathy screening Lab Results   Component Value Date    LABMICR 5.0 12/08/2023     No results found for: "PROTEINUA"  No results found for: "UTPCR"   Annually Yes   Blood pressure BP Readings from Last 1 Encounters:   04/18/24 (!) 163/88    Less than 140/90 Yes   Dilated retinal exam : 11/08/2023 Annually Yes   Foot exam   : 01/05/2024 Annually Yes   Patient's medications, allergies, surgical, social and family histories were reviewed and updated as appropriate.     Review of Systems   Constitutional:  Negative for weight loss.   Eyes:  Negative for blurred vision and double vision.   Cardiovascular:  Negative for chest pain.   Gastrointestinal:  Negative for nausea and vomiting.   Genitourinary:  Negative for frequency.   Musculoskeletal:  Negative for falls.   Neurological:  Negative for dizziness and weakness.   Endo/Heme/Allergies:  Negative for polydipsia.   Psychiatric/Behavioral:  Negative for depression.    All other systems reviewed and are negative.       Physical Exam  Constitutional:       General: He is not in acute distress.     Appearance: Normal appearance.   Eyes:      Extraocular Movements: Extraocular movements intact.      Pupils: Pupils are equal, round, and reactive to light.   Cardiovascular:      Rate and Rhythm: Normal rate and regular rhythm.      Heart sounds: Normal heart sounds.   Pulmonary:      Effort: Pulmonary effort is normal. No respiratory distress.      Breath sounds: Normal breath sounds. " "  Musculoskeletal:      Cervical back: Normal range of motion and neck supple.   Neurological:      Mental Status: He is alert and oriented to person, place, and time.   Psychiatric:         Mood and Affect: Mood normal.         Behavior: Behavior normal.        Blood pressure (!) 163/88, pulse (!) 49, height 6' (1.829 m), weight 80.1 kg (176 lb 8 oz).  Wt Readings from Last 3 Encounters:   04/18/24 80.1 kg (176 lb 8 oz)   02/15/24 81.5 kg (179 lb 11.2 oz)   01/19/24 79.9 kg (176 lb 3.2 oz)       LAB REVIEW  Lab Results   Component Value Date     12/08/2023    K 4.2 12/08/2023     12/08/2023    CO2 22 (L) 12/08/2023    BUN 9 12/08/2023    CREATININE 1.1 12/08/2023    CALCIUM 9.4 12/08/2023    ANIONGAP 12 12/08/2023    EGFRNORACEVR >60.0 12/08/2023     No results found for: "CPEPTIDE", "GLUTAMICACID", "INSLNABS"  Hemoglobin A1C   Date Value Ref Range Status   04/05/2024 7.0 (H) 4.0 - 5.6 % Final     Comment:     ADA Screening Guidelines:  5.7-6.4%  Consistent with prediabetes  >or=6.5%  Consistent with diabetes    High levels of fetal hemoglobin interfere with the HbA1C  assay. Heterozygous hemoglobin variants (HbS, HgC, etc)do  not significantly interfere with this assay.   However, presence of multiple variants may affect accuracy.     12/08/2023 8.8 (H) 4.0 - 5.6 % Final     Comment:     ADA Screening Guidelines:  5.7-6.4%  Consistent with prediabetes  >or=6.5%  Consistent with diabetes    High levels of fetal hemoglobin interfere with the HbA1C  assay. Heterozygous hemoglobin variants (HbS, HgC, etc)do  not significantly interfere with this assay.   However, presence of multiple variants may affect accuracy.     06/29/2023 7.2 (H) 4.0 - 5.6 % Final     Comment:     ADA Screening Guidelines:  5.7-6.4%  Consistent with prediabetes  >or=6.5%  Consistent with diabetes    High levels of fetal hemoglobin interfere with the HbA1C  assay. Heterozygous hemoglobin variants (HbS, HgC, etc)do  not significantly " interfere with this assay.   However, presence of multiple variants may affect accuracy.         Lab Results   Component Value Date    POCGLU 92 2024       ASSESSMENT    ICD-10-CM ICD-9-CM   1. Type 2 diabetes mellitus with hyperglycemia, unspecified whether long term insulin use  E11.65 250.00       PLAN  Diagnoses and all orders for this visit:    Type 2 diabetes mellitus with hyperglycemia, unspecified whether long term insulin use  -     POCT Glucose, Hand-Held Device  -     empagliflozin-metformin (SYNJARDY XR) 10-1,000 mg TBph; Take 1 tablet by mouth once daily.        Reviewed pathophysiology of diabetes, complications related to the disease, importance of annual dilated eye exam and daily foot examination. Explained MOA, SE, dosage of medications. Written instructions given and reviewed with patient and patient verbalizes understanding.     PATIENT INSTRUCTIONS     Discontinue Synjardy due to cost.   Start Xigduo  mg by mouth daily.   [facility] -  Call [phone] to schedule appointment with Deirdre Llanes NP, for prescription refills of Xigduo. Each prescription is $8 for 90 day supply. You will need to be seen once a year by a provider at [facility] to continue receiving prescription refills.    [facility]   [address] [place] [address]     Continue Actos 30 mg by mouth daily.   Decrease Humalog three times daily BEFORE meals     If  - 250, may take 4 units of Humalog  If  - 300, may take 5 units of Humalog   If  - 350, may take 6 units of Humalog  If  - 400, may take 7 units of Humalog  If +, may take 8 units of Humalog      Continue Katheryn 2 CGM  Blood Sugar Goals:       Fastin-130.       1-2 hours after a meal: Less than 180.     Follow up in about 3 months (around 2024) for Katheryn.    Portions of this note were prepared with [employer] voice recognition transcription software.  Grammatical errors, including garbled syntax, mangle pronouns, and other bizarre constructions may be attributed to that software system.

## 2024-04-24 ENCOUNTER — TELEPHONE (OUTPATIENT)
Dept: DIABETES | Facility: CLINIC | Age: 69
End: 2024-04-24

## 2024-04-24 ENCOUNTER — TELEPHONE (OUTPATIENT)
Dept: DIABETES | Facility: CLINIC | Age: 69
End: 2024-04-24
Payer: COMMERCIAL

## 2024-04-24 NOTE — TELEPHONE ENCOUNTER
----- Message from Gini Alatorre sent at 4/24/2024  8:37 AM CDT -----  Contact: Ban clay (JEANETTE) with [facility] is requesting a call back in regards to a referral that was sent to her for the pt. Please call back @ [phone]

## 2024-04-24 NOTE — TELEPHONE ENCOUNTER
Returned phone call. 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 Provided her with our fax number.

## 2024-04-29 ENCOUNTER — TELEPHONE (OUTPATIENT)
Dept: DIABETES | Facility: CLINIC | Age: 69
End: 2024-04-29
Payer: COMMERCIAL

## 2024-04-29 NOTE — TELEPHONE ENCOUNTER
Patient called returned regarding referral from provider to assist with patient medications. Patient stated the provider he was referred to is currently not taking any new patients but he was able to make an appointment with another provider, patient informed I will send message to provider. Patient voiced understanding.

## 2024-05-02 ENCOUNTER — TELEPHONE (OUTPATIENT)
Dept: FAMILY MEDICINE | Facility: CLINIC | Age: 69
End: 2024-05-02
Payer: COMMERCIAL

## 2024-05-02 DIAGNOSIS — I15.2 HYPERTENSION ASSOCIATED WITH DIABETES: ICD-10-CM

## 2024-05-02 DIAGNOSIS — Z79.899 ENCOUNTER FOR LONG-TERM (CURRENT) USE OF OTHER MEDICATIONS: Primary | ICD-10-CM

## 2024-05-02 DIAGNOSIS — E11.65 TYPE 2 DIABETES MELLITUS WITH HYPERGLYCEMIA, UNSPECIFIED WHETHER LONG TERM INSULIN USE: ICD-10-CM

## 2024-05-02 DIAGNOSIS — E11.59 HYPERTENSION ASSOCIATED WITH DIABETES: ICD-10-CM

## 2024-05-02 DIAGNOSIS — J47.1 BRONCHIECTASIS WITH ACUTE EXACERBATION: ICD-10-CM

## 2024-05-02 NOTE — TELEPHONE ENCOUNTER
----- Message from Trino Snyder MD sent at 4/30/2024  5:53 PM CDT -----  Sugar looking better .Schedule Vitamin B12, lipid panel, CMP, hemoglobin A1c,  urine microalbumin within 6 months of the date of this last test.  My nurse will contact you to arrange.  Thanks,  Dr. Snyder

## 2024-06-04 ENCOUNTER — TELEPHONE (OUTPATIENT)
Dept: FAMILY MEDICINE | Facility: CLINIC | Age: 69
End: 2024-06-04
Payer: COMMERCIAL

## 2024-06-04 NOTE — TELEPHONE ENCOUNTER
----- Message from Nemo Xie sent at 6/4/2024  3:38 PM CDT -----  Contact: pt [phone]  Type:  Sooner Appointment Request    Caller is requesting a sooner appointment.  Caller declined first available appointment listed below.  Caller will not accept being placed on the waitlist and is requesting a message be sent to doctor.    Name of Caller:  Pt   When is the first available appointment?  Jun 19  Symptoms:  f/u from dot physical   Would the patient rather a call back or a response via DocSeaner? Call   Best Call Back Number:  [phone]    Additional Information:  Pls call back and advise / Needs to be seen before 06/19

## 2024-06-05 ENCOUNTER — OFFICE VISIT (OUTPATIENT)
Dept: FAMILY MEDICINE | Facility: CLINIC | Age: 69
End: 2024-06-05
Payer: COMMERCIAL

## 2024-06-05 VITALS
HEIGHT: 72 IN | WEIGHT: 175.5 LBS | TEMPERATURE: 98 F | DIASTOLIC BLOOD PRESSURE: 78 MMHG | OXYGEN SATURATION: 96 % | HEART RATE: 54 BPM | SYSTOLIC BLOOD PRESSURE: 129 MMHG | BODY MASS INDEX: 23.77 KG/M2

## 2024-06-05 DIAGNOSIS — E11.59 HYPERTENSION ASSOCIATED WITH DIABETES: ICD-10-CM

## 2024-06-05 DIAGNOSIS — I15.2 HYPERTENSION ASSOCIATED WITH DIABETES: ICD-10-CM

## 2024-06-05 DIAGNOSIS — E11.9 TYPE 2 DIABETES MELLITUS WITHOUT COMPLICATION, UNSPECIFIED WHETHER LONG TERM INSULIN USE: Primary | ICD-10-CM

## 2024-06-05 DIAGNOSIS — Z12.11 SCREENING FOR COLON CANCER: ICD-10-CM

## 2024-06-05 PROCEDURE — 3072F LOW RISK FOR RETINOPATHY: CPT | Mod: CPTII,S$GLB,, | Performed by: FAMILY MEDICINE

## 2024-06-05 PROCEDURE — 1101F PT FALLS ASSESS-DOCD LE1/YR: CPT | Mod: CPTII,S$GLB,, | Performed by: FAMILY MEDICINE

## 2024-06-05 PROCEDURE — 3008F BODY MASS INDEX DOCD: CPT | Mod: CPTII,S$GLB,, | Performed by: FAMILY MEDICINE

## 2024-06-05 PROCEDURE — 3288F FALL RISK ASSESSMENT DOCD: CPT | Mod: CPTII,S$GLB,, | Performed by: FAMILY MEDICINE

## 2024-06-05 PROCEDURE — 3078F DIAST BP <80 MM HG: CPT | Mod: CPTII,S$GLB,, | Performed by: FAMILY MEDICINE

## 2024-06-05 PROCEDURE — 1159F MED LIST DOCD IN RCRD: CPT | Mod: CPTII,S$GLB,, | Performed by: FAMILY MEDICINE

## 2024-06-05 PROCEDURE — 4010F ACE/ARB THERAPY RXD/TAKEN: CPT | Mod: CPTII,S$GLB,, | Performed by: FAMILY MEDICINE

## 2024-06-05 PROCEDURE — G2211 COMPLEX E/M VISIT ADD ON: HCPCS | Mod: S$GLB,,, | Performed by: FAMILY MEDICINE

## 2024-06-05 PROCEDURE — 3051F HG A1C>EQUAL 7.0%<8.0%: CPT | Mod: CPTII,S$GLB,, | Performed by: FAMILY MEDICINE

## 2024-06-05 PROCEDURE — 3074F SYST BP LT 130 MM HG: CPT | Mod: CPTII,S$GLB,, | Performed by: FAMILY MEDICINE

## 2024-06-05 PROCEDURE — 99999 PR PBB SHADOW E&M-EST. PATIENT-LVL V: CPT | Mod: PBBFAC,,, | Performed by: FAMILY MEDICINE

## 2024-06-05 PROCEDURE — 99214 OFFICE O/P EST MOD 30 MIN: CPT | Mod: S$GLB,,, | Performed by: FAMILY MEDICINE

## 2024-06-05 PROCEDURE — 1126F AMNT PAIN NOTED NONE PRSNT: CPT | Mod: CPTII,S$GLB,, | Performed by: FAMILY MEDICINE

## 2024-06-05 RX ORDER — METFORMIN HYDROCHLORIDE 500 MG/1
500 TABLET, EXTENDED RELEASE ORAL 2 TIMES DAILY WITH MEALS
COMMUNITY
Start: 2024-01-05

## 2024-06-05 NOTE — LETTER
June 5, 2024    Misha Simpson  [address]  [facility] [address]             [facility]  [address] [facility] [address]  Phone: [phone]  Fax: [phone] To whom it may concern,     Please excuse Misha Simpson on 6/5/2024 from work this afternoon . He had a Dr appointment .               If you have any questions or concerns, please don't hesitate to call.    Sincerely,        Trino Snyder MD

## 2024-06-05 NOTE — PROGRESS NOTES
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  He was started on insulin January 5, 2024 due to hyperglycemia on sliding scale.  Did see Endocrine nurse practitioner and has had medication adjustments with improvement of his glucose.  He states he has not used the insulin past 2 months.  Overall his glucose appears to be better with some postprandial excursions.  He did have a few episodes of mild hypoglycemia between 56-70 occurring for several days in April which was asymptomatic.  He has not any symptoms since then.  Did not have any syncope, seizure, coma or need for assistance from others.  He was not aware of being hypoglycemic.  He is using Jardiance 10 mg as well as metformin 500 mg twice a day.  Was not able to tolerate higher dose of metformin.  He is also using pioglitazone.  His blood pressure is controlled.  He needs his colonoscopy scheduled.                  Misha was seen today for follow-up.    Diagnoses and all orders for this visit:    Type 2 diabetes mellitus without complication, unspecified whether long term insulin use    Hypertension associated with diabetes    Screening for colon cancer  -     Case Request Endoscopy: COLONOSCOPY      He has an appointment scheduled with endocrine nurse practitioner however states needs the insulin form completed prior to this appointment.  I will review this with Marlyn Maldonado who sees him prior to this.  We may be able to just discontinue the insulin altogether as he has not using anyway which would obviate the need for form.  He has follow-up laboratory scheduled regarding his diabetes later year.  Continue current antihypertensive medication.    Addendum: I reviewed with Marlyn Maldonado his diabetes nurse practitioner.  Seems to be doing okay without the insulin past couple of months and insulin will be discontinued now from his medication list.    Visit today included increased complexity associated with the care of the  "episodic problem addressed and/or managing the longitudinal care of the patient due to the serious and/or complex managed problem(s) as per above diagnosis list.  Diabetes Management Status    Statin: Taking  ACE/ARB: Taking    Screening or Prevention Patient's value Goal Complete/Controlled?   HgA1C Testing and Control   Lab Results   Component Value Date    HGBA1C 7.0 (H) 04/05/2024      Annually/Less than 8% Yes   Lipid profile : 12/08/2023 Annually Yes   LDL control Lab Results   Component Value Date    LDLCALC 70.4 12/08/2023    Annually/Less than 100 mg/dl  Yes   Nephropathy screening Lab Results   Component Value Date    LABMICR 5.0 12/08/2023     No results found for: "PROTEINUA" Annually Yes   Blood pressure BP Readings from Last 1 Encounters:   06/05/24 129/78    Less than 140/90 Yes   Dilated retinal exam : 11/08/2023 Annually Yes   Foot exam   : 01/05/2024 Annually Yes       Past Medical History:  Past Medical History:   Diagnosis Date    Adrenal adenoma 2008    bx benign    Arthritis     Bronchiectasis 05/15/2012    Cataract     COPD, mild 10/02/2017    COVID-19 05/23/2022    [facility]    Diabetes mellitus, type 2     Emphysema     Hemoptysis     Hyperlipidemia     Hyperplastic colon polyp     Hypertension     Lung nodules     small stable on serial ct    Lymphocytosis     reactive    Peptic ulcer disease     Pneumonia     Tobacco abuse 05/15/2012     Past Surgical History:   Procedure Laterality Date    APPENDECTOMY      BONE MARROW BIOPSY      BRONCHOSCOPY      COLONOSCOPY  08/06/2013         KNEE ARTHROSCOPY Left     MULTIPLE TOOTH EXTRACTIONS      NASAL FRACTURE SURGERY      AZ CLOSED TX NASAL BONE FX W/MNPJ W/STABILIZATION     Review of patient's allergies indicates:   Allergen Reactions    Aspirin      Other reaction(s): Stomach upset  Other reaction(s): Nausea    Aspirin Other (See Comments)     Stomach problems and heart burn    Other reaction(s): Stomach upset " "Other reaction(s): Nausea    Hydrocodone Itching and Rash     Current Outpatient Medications on File Prior to Visit   Medication Sig Dispense Refill    acetaminophen (TYLENOL) 500 MG tablet Take 2 tablets (1,000 mg total) by mouth every 6 (six) hours as needed for Pain.      albuterol (PROVENTIL/VENTOLIN HFA) 90 mcg/actuation inhaler Inhale 2 puffs into the lungs every 6 (six) hours as needed.      ALBUTEROL INHL Inhale into the lungs as needed.       amLODIPine (NORVASC) 5 MG tablet Take 1 tablet (5 mg total) by mouth once daily. 90 tablet 1    benazepriL (LOTENSIN) 40 MG tablet Take 1 tablet (40 mg total) by mouth once daily. 90 tablet 1    blood sugar diagnostic (BLOOD GLUCOSE TEST) Strp Test glucose 1 x daily (use insurance preferred brand) 50 strip 11    blood-glucose meter Misc Use as directed 1 each prn    empagliflozin (JARDIANCE) 10 mg tablet Take 1 tablet by mouth once daily.      flash glucose sensor ([employer] SupplySTYLE RAMA 2 SENSOR) Kit Uses to check Blood sugar once a day 1 kit 11    fluticasone propionate (FLONASE) 50 mcg/actuation nasal spray 1 spray by Each Nostril route once daily.      hydroCHLOROthiazide (HYDRODIURIL) 25 MG tablet Take 1 tablet (25 mg total) by mouth once daily. 90 tablet 1    lancets Misc As directed 100 each prn    metFORMIN (GLUCOPHAGE-XR) 500 MG ER 24hr tablet Take 500 mg by mouth 2 (two) times daily with meals. Lunch and supper      methocarbamoL (ROBAXIN) 500 MG Tab 1-2 po bid prn spasm      pen needle, diabetic 32 gauge x 5/32" Ndle Use once daily as directed 100 each 3    pioglitazone (ACTOS) 30 MG tablet Take 1 tablet (30 mg total) by mouth once daily. 90 tablet 1    pravastatin (PRAVACHOL) 20 MG tablet Take 1 tablet (20 mg total) by mouth once daily. 90 tablet 1    sodium chloride 3% 3 % nebulizer solution Inhale 4 mLs into the lungs.      VENTOLIN HFA 90 mcg/actuation inhaler INHALE TWO PUFFS EVERY 4 TO 6 HOURS AS NEEDED 18 g 2    [DISCONTINUED] insulin aspart U-100 (NOVOLOG " FLEXPEN U-100 INSULIN) 100 unit/mL (3 mL) InPn pen Inject 4-8 Units into the skin 3 (three) times daily with meals. USING CORRECTION SCALE 7.2 mL 11     No current facility-administered medications on file prior to visit.     Social History     Socioeconomic History    Marital status:    Tobacco Use    Smoking status: Some Days     Current packs/day: 0.00     Average packs/day: 0.3 packs/day for 53.0 years (13.3 ttl pk-yrs)     Types: Cigarettes, Pipe     Start date: 1965     Last attempt to quit: 2018     Years since quittin.4    Smokeless tobacco: Never    Tobacco comments:     1 pack every 3 days   Substance and Sexual Activity    Alcohol use: No     Alcohol/week: 1.0 standard drink of alcohol     Types: 1 Cans of beer per week     Comment: 1 beer every 2 weeks    Drug use: No   Social History Narrative    ** Merged History Encounter **          Social Determinants of Health     Financial Resource Strain: Low Risk  (5/10/2024)    Received from [facility]    Overall Financial Resource Strain (CARDIA)     Difficulty of Paying Living Expenses: Not very hard   Transportation Needs: No Transportation Needs (5/10/2024)    Received from [facility]    PRAPARE - Transportation     Lack of Transportation (Medical): No     Lack of Transportation (Non-Medical): No     Family History   Problem Relation Name Age of Onset    Kidney failure Father      Diabetes Father      Heart disease Mother  60    Diabetes Brother Keith     Cancer Brother Keith         prostate    Glaucoma Brother Keith         ?    Cancer Brother      Amblyopia Neg Hx      Blindness Neg Hx      Cataracts Neg Hx      Hypertension Neg Hx      Macular degeneration Neg Hx      Retinal detachment Neg Hx      Strabismus Neg Hx      Stroke Neg Hx      Thyroid disease Neg Hx             ROS:  GENERAL: No fever, chills,  or significant weight changes.   CARDIOVASCULAR: Denies chest pain, PND, orthopnea or reduced exercise  tolerance.  ABDOMEN: Appetite fine. Denies diarrhea, abdominal pain, hematemesis or blood in stool.  URINARY: No flank pain, dysuria or hematuria.    Vitals:    06/05/24 1505   BP: 129/78   Pulse: (!) 54   Temp: 97.5 °F (36.4 °C)   TempSrc: Temporal   SpO2: 96%   Weight: 79.6 kg (175 lb 8 oz)   Height: 6' (1.829 m)       Wt Readings from Last 3 Encounters:   06/05/24 79.6 kg (175 lb 8 oz)   04/18/24 80.1 kg (176 lb 8 oz)   02/15/24 81.5 kg (179 lb 11.2 oz)       APPEARANCE: Well nourished, well developed, in no acute distress.    HEAD: Normocephalic.  Atraumatic.  EYES:   Right eye: Pupil reactive.  Conjunctiva clear.    Left eye: Pupil reactive.  Conjunctiva clear.    NECK: Supple.   MENTAL STATUS: Alert.  Oriented x 3.

## 2024-06-06 ENCOUNTER — TELEPHONE (OUTPATIENT)
Dept: FAMILY MEDICINE | Facility: CLINIC | Age: 69
End: 2024-06-06
Payer: COMMERCIAL

## 2024-06-06 NOTE — TELEPHONE ENCOUNTER
Please let patient know that I spoke with Marlyn Maldonado regarding the insulin and we can discontinue it from his medication list as he has not been requiring it.  He can  the form that he brought in tomorrow, but likely should not require this if he is not taking insulin any longer.

## 2024-06-06 NOTE — TELEPHONE ENCOUNTER
I have attempted to contact this patient by phone with the following results: no answer, left message to return my call on answering machine.     No

## 2024-06-07 ENCOUNTER — TELEPHONE (OUTPATIENT)
Dept: FAMILY MEDICINE | Facility: CLINIC | Age: 69
End: 2024-06-07
Payer: COMMERCIAL

## 2024-06-07 NOTE — TELEPHONE ENCOUNTER
----- Message from Pee Boateng sent at 6/7/2024  8:14 AM CDT -----  Contact: Patient  Patient is returning a call regarding results. Please call patient at .[phone].

## 2024-06-07 NOTE — TELEPHONE ENCOUNTER
Please let patient know that I spoke with Marlyn Maldonado regarding the insulin and we can discontinue it from his medication list as he has not been requiring it.  He can  the form that he brought in tomorrow, but likely should not require this if he is not taking insulin any longer.          Pt was advised of Dr Snyder response and recommendations. Copy made of form to be scanned . Put in  folder for

## 2024-06-10 ENCOUNTER — TELEPHONE (OUTPATIENT)
Dept: FAMILY MEDICINE | Facility: CLINIC | Age: 69
End: 2024-06-10
Payer: COMMERCIAL

## 2024-06-10 NOTE — TELEPHONE ENCOUNTER
----- Message from Jarrod Logan sent at 6/10/2024  8:18 AM CDT -----  Contact: self  Type: Sooner Appointment Request        Caller is requesting a sooner appointment. Caller declined first available appointment listed below. Caller will not accept being placed on the waitlist and is requesting a message be sent to doctor.        Name of Caller: Patient   Best Call Back Number: [phone]  Additional Information: Pt would like a call to get schedule  for a colonoscopy. Plz call to get him in. Thanks

## 2024-06-10 NOTE — TELEPHONE ENCOUNTER
Pt informed to contact endoscopy scheduling, in [place], as discussed at last office visit, informed him it sometimes can be a bit if they are backed up

## 2024-06-20 ENCOUNTER — OFFICE VISIT (OUTPATIENT)
Dept: DIABETES | Facility: CLINIC | Age: 69
End: 2024-06-20
Payer: COMMERCIAL

## 2024-06-20 VITALS
WEIGHT: 172.19 LBS | DIASTOLIC BLOOD PRESSURE: 63 MMHG | SYSTOLIC BLOOD PRESSURE: 109 MMHG | HEART RATE: 56 BPM | BODY MASS INDEX: 23.32 KG/M2 | HEIGHT: 72 IN

## 2024-06-20 DIAGNOSIS — E11.65 TYPE 2 DIABETES MELLITUS WITH HYPERGLYCEMIA, UNSPECIFIED WHETHER LONG TERM INSULIN USE: Primary | ICD-10-CM

## 2024-06-20 LAB — GLUCOSE SERPL-MCNC: 171 MG/DL (ref 70–110)

## 2024-06-20 PROCEDURE — 4010F ACE/ARB THERAPY RXD/TAKEN: CPT | Mod: CPTII,S$GLB,, | Performed by: NURSE PRACTITIONER

## 2024-06-20 PROCEDURE — 3078F DIAST BP <80 MM HG: CPT | Mod: CPTII,S$GLB,, | Performed by: NURSE PRACTITIONER

## 2024-06-20 PROCEDURE — 3072F LOW RISK FOR RETINOPATHY: CPT | Mod: CPTII,S$GLB,, | Performed by: NURSE PRACTITIONER

## 2024-06-20 PROCEDURE — 1101F PT FALLS ASSESS-DOCD LE1/YR: CPT | Mod: CPTII,S$GLB,, | Performed by: NURSE PRACTITIONER

## 2024-06-20 PROCEDURE — 99999 PR PBB SHADOW E&M-EST. PATIENT-LVL V: CPT | Mod: PBBFAC,,, | Performed by: NURSE PRACTITIONER

## 2024-06-20 PROCEDURE — 3008F BODY MASS INDEX DOCD: CPT | Mod: CPTII,S$GLB,, | Performed by: NURSE PRACTITIONER

## 2024-06-20 PROCEDURE — 3288F FALL RISK ASSESSMENT DOCD: CPT | Mod: CPTII,S$GLB,, | Performed by: NURSE PRACTITIONER

## 2024-06-20 PROCEDURE — 82962 GLUCOSE BLOOD TEST: CPT | Mod: S$GLB,,, | Performed by: NURSE PRACTITIONER

## 2024-06-20 PROCEDURE — 1159F MED LIST DOCD IN RCRD: CPT | Mod: CPTII,S$GLB,, | Performed by: NURSE PRACTITIONER

## 2024-06-20 PROCEDURE — 99214 OFFICE O/P EST MOD 30 MIN: CPT | Mod: S$GLB,,, | Performed by: NURSE PRACTITIONER

## 2024-06-20 PROCEDURE — 3074F SYST BP LT 130 MM HG: CPT | Mod: CPTII,S$GLB,, | Performed by: NURSE PRACTITIONER

## 2024-06-20 PROCEDURE — 3051F HG A1C>EQUAL 7.0%<8.0%: CPT | Mod: CPTII,S$GLB,, | Performed by: NURSE PRACTITIONER

## 2024-06-20 PROCEDURE — 1126F AMNT PAIN NOTED NONE PRSNT: CPT | Mod: CPTII,S$GLB,, | Performed by: NURSE PRACTITIONER

## 2024-06-20 NOTE — PATIENT INSTRUCTIONS
PATIENT INSTRUCTIONS     A1c ordered.     Continue Jardiance 10 mg by mouth daily.  Continue Glucophage XR 1000 mg by mouth daily.    Continue Actos 30 mg by mouth daily.      Continue Katheryn 2 CGM  Blood Sugar Goals:       Fastin-130.       1-2 hours after a meal: Less than 180.

## 2024-06-20 NOTE — LETTER
June 20, 2024      [facility]  [address] [address]  Phone: [phone]  Fax: [phone]       Patient: Misha Simpson   YOB: 1955  Date of Visit: 06/20/2024    To Whom It May Concern:    Janet Simpson  was at [facility] on 06/20/2024. The patient may return to work/school on 06/21/2024 with no restrictions. If you have any questions or concerns, or if I can be of further assistance, please do not hesitate to contact me.    Sincerely,    Osei Lira MA

## 2024-06-20 NOTE — PROGRESS NOTES
Misha Simpson is a 68 y.o. male who  has a past medical history of Adrenal adenoma (2008), Arthritis, Bronchiectasis (05/15/2012), Cataract, COPD, mild (10/02/2017), COVID-19 (05/23/2022), Diabetes mellitus, type 2, Emphysema, Hemoptysis, Hyperlipidemia, Hyperplastic colon polyp, Hypertension, Lung nodules, Lymphocytosis, Peptic ulcer disease, Pneumonia, and Tobacco abuse (05/15/2012). and presents for a follow up evaluation of Type 2 diabetes mellitus.     CHIEF COMPLAINT: Diabetes Consultation    PCP: Trino Snyder MD     Initial visit with me - 2/15/2024    The patient was initially diagnosed with diabetes in 2002.      Previous failed treatments include:  NONE     Social Documentation:  Patient lives in [place].   Occupation: .   Exercise: no formal exercise.      Diabetes related complications:   none.   denies Pancreatitis  denies Gastroparesis  denies DKA  denies Hx/family Hx of MEN2/MTC  denies Frequent UTIs/yeast infections     Cardiovascular Risk Factors: advanced age (>55 for men, >65 for women), dyslipidemia, hypertension, male gender, and smoking/tobacco exposure.    Diabetes Medications               empagliflozin (JARDIANCE) 10 mg tablet Take 1 tablet by mouth once daily.    metFORMIN (GLUCOPHAGE-XR) 500 MG ER 24hr tablet Take 500 mg by mouth 2 (two) times daily with meals. Lunch and supper    pioglitazone (ACTOS) 30 MG tablet Take 1 tablet (30 mg total) by mouth once daily.     Current monitoring regimen:  Katheryn 2 CGM     The patient's [employer] CGM was downloaded and reviewed. For the past 14 days, patient average glucose was 139 mg/dL. He was above range 11% of the time, in range 89% of the time, and below range 0% of the time. The target range for this patient was 70 - 180 mg/dL. Overall, there was a pattern of euglycemia, mild post prandial hyperglycemia..        Patient currently taking insulin or sulfonylurea?  Yes. Emergency Glucagon Prescription current?  "no  Recent hypoglycemic episodes: No.     Patient compliant with glucose checks and medication administration? Yes    DIABETES MANAGEMENT STATUS  Statin: Taking  ACE/ARB: Taking  Screening or Prevention Patient's value Goal Complete/Controlled?   HgA1C Testing and Control   Lab Results   Component Value Date    HGBA1C 7.0 (H) 04/05/2024      Annually/Less than 8% Yes   Lipid profile : 12/08/2023 Annually Yes   LDL control Lab Results   Component Value Date    LDLCALC 70.4 12/08/2023    Annually/Less than 100 mg/dl  Yes   Nephropathy screening Lab Results   Component Value Date    LABMICR 5.0 12/08/2023     No results found for: "PROTEINUA"  No results found for: "UTPCR"   Annually Yes   Blood pressure BP Readings from Last 1 Encounters:   06/20/24 109/63    Less than 140/90 Yes   Dilated retinal exam : 11/08/2023 Annually Yes   Foot exam   : 01/05/2024 Annually Yes   Patient's medications, allergies, surgical, social and family histories were reviewed and updated as appropriate.     Review of Systems   Constitutional:  Negative for weight loss.   Eyes:  Negative for blurred vision and double vision.   Cardiovascular:  Negative for chest pain.   Gastrointestinal:  Negative for nausea and vomiting.   Genitourinary:  Negative for frequency.   Musculoskeletal:  Negative for falls.   Neurological:  Negative for dizziness and weakness.   Endo/Heme/Allergies:  Negative for polydipsia.   Psychiatric/Behavioral:  Negative for depression.    All other systems reviewed and are negative.       Physical Exam  Constitutional:       General: He is not in acute distress.     Appearance: Normal appearance.   Eyes:      Extraocular Movements: Extraocular movements intact.      Pupils: Pupils are equal, round, and reactive to light.   Cardiovascular:      Rate and Rhythm: Normal rate and regular rhythm.      Heart sounds: Normal heart sounds.   Pulmonary:      Effort: Pulmonary effort is normal. No respiratory distress.      Breath " "sounds: Normal breath sounds.   Musculoskeletal:      Cervical back: Normal range of motion and neck supple.   Neurological:      Mental Status: He is alert and oriented to person, place, and time.   Psychiatric:         Mood and Affect: Mood normal.         Behavior: Behavior normal.        Blood pressure 109/63, pulse (!) 56, height 6' (1.829 m), weight 78.1 kg (172 lb 3.2 oz).  Wt Readings from Last 3 Encounters:   06/20/24 78.1 kg (172 lb 3.2 oz)   06/05/24 79.6 kg (175 lb 8 oz)   04/18/24 80.1 kg (176 lb 8 oz)       LAB REVIEW  Lab Results   Component Value Date     12/08/2023    K 4.2 12/08/2023     12/08/2023    CO2 22 (L) 12/08/2023    BUN 9 12/08/2023    CREATININE 1.1 12/08/2023    CALCIUM 9.4 12/08/2023    ANIONGAP 12 12/08/2023    EGFRNORACEVR >60.0 12/08/2023     No results found for: "CPEPTIDE", "GLUTAMICACID", "INSLNABS"  Hemoglobin A1C   Date Value Ref Range Status   04/05/2024 7.0 (H) 4.0 - 5.6 % Final     Comment:     ADA Screening Guidelines:  5.7-6.4%  Consistent with prediabetes  >or=6.5%  Consistent with diabetes    High levels of fetal hemoglobin interfere with the HbA1C  assay. Heterozygous hemoglobin variants (HbS, HgC, etc)do  not significantly interfere with this assay.   However, presence of multiple variants may affect accuracy.     12/08/2023 8.8 (H) 4.0 - 5.6 % Final     Comment:     ADA Screening Guidelines:  5.7-6.4%  Consistent with prediabetes  >or=6.5%  Consistent with diabetes    High levels of fetal hemoglobin interfere with the HbA1C  assay. Heterozygous hemoglobin variants (HbS, HgC, etc)do  not significantly interfere with this assay.   However, presence of multiple variants may affect accuracy.     06/29/2023 7.2 (H) 4.0 - 5.6 % Final     Comment:     ADA Screening Guidelines:  5.7-6.4%  Consistent with prediabetes  >or=6.5%  Consistent with diabetes    High levels of fetal hemoglobin interfere with the HbA1C  assay. Heterozygous hemoglobin variants (HbS, HgC, " etc)do  not significantly interfere with this assay.   However, presence of multiple variants may affect accuracy.         Lab Results   Component Value Date    POCGLU 171 (A) 2024       ASSESSMENT    ICD-10-CM ICD-9-CM   1. Type 2 diabetes mellitus with hyperglycemia, unspecified whether long term insulin use  E11.65 250.00       PLAN  Diagnoses and all orders for this visit:    Type 2 diabetes mellitus with hyperglycemia, unspecified whether long term insulin use  -     POCT Glucose, Hand-Held Device  -     Hemoglobin A1C; Future        Reviewed pathophysiology of diabetes, complications related to the disease, importance of annual dilated eye exam and daily foot examination. Explained MOA, SE, dosage of medications. Written instructions given and reviewed with patient and patient verbalizes understanding.     PATIENT INSTRUCTIONS     A1c ordered.     Continue Jardiance 10 mg by mouth daily.  Continue Glucophage XR 1000 mg by mouth daily.    Continue Actos 30 mg by mouth daily.      Continue Katheryn 2 CGM  Blood Sugar Goals:       Fastin-130.       1-2 hours after a meal: Less than 180.     Follow up in about 3 months (around 2024) for Katheryn, Schedule non-fasting labs.

## 2024-06-24 ENCOUNTER — TELEPHONE (OUTPATIENT)
Dept: GASTROENTEROLOGY | Facility: CLINIC | Age: 69
End: 2024-06-24
Payer: COMMERCIAL

## 2024-06-24 NOTE — TELEPHONE ENCOUNTER
Spoke with pt and he states he will have his wife give us a call back to schedule. Phone number provided for call back.

## 2024-07-05 ENCOUNTER — LAB VISIT (OUTPATIENT)
Dept: LAB | Facility: HOSPITAL | Age: 69
End: 2024-07-05
Attending: NURSE PRACTITIONER
Payer: COMMERCIAL

## 2024-07-05 DIAGNOSIS — E11.65 TYPE 2 DIABETES MELLITUS WITH HYPERGLYCEMIA, UNSPECIFIED WHETHER LONG TERM INSULIN USE: ICD-10-CM

## 2024-07-05 LAB
ESTIMATED AVG GLUCOSE: 148 MG/DL (ref 68–131)
HBA1C MFR BLD: 6.8 % (ref 4–5.6)

## 2024-07-05 PROCEDURE — 36415 COLL VENOUS BLD VENIPUNCTURE: CPT | Mod: PO | Performed by: NURSE PRACTITIONER

## 2024-07-05 PROCEDURE — 83036 HEMOGLOBIN GLYCOSYLATED A1C: CPT | Performed by: NURSE PRACTITIONER

## 2024-07-18 ENCOUNTER — TELEPHONE (OUTPATIENT)
Dept: GASTROENTEROLOGY | Facility: CLINIC | Age: 69
End: 2024-07-18
Payer: COMMERCIAL

## 2024-07-23 RX ORDER — FLASH GLUCOSE SENSOR
KIT MISCELLANEOUS
Qty: 1 KIT | Refills: 11 | Status: SHIPPED | OUTPATIENT
Start: 2024-07-23

## 2024-07-23 NOTE — TELEPHONE ENCOUNTER
No care due was identified.  [facility] Embedded Care Due Messages. Reference number: [id number].   7/23/2024 9:11:20 AM CDT

## 2024-09-27 ENCOUNTER — TELEPHONE (OUTPATIENT)
Dept: PHARMACY | Facility: CLINIC | Age: 69
End: 2024-09-27
Payer: COMMERCIAL

## 2024-09-27 NOTE — TELEPHONE ENCOUNTER
[facility]/Population Health Chart Review & Patient Outreach Details For Medication Adherence Project    Reason for Outreach Encounter: 3rd Party payor non-compliance report ([employer], [employer], C, etc)  2.  Patient Outreach Method: Telephone call  3.   Medication in question:   Diabetes Medications               empagliflozin (JARDIANCE) 10 mg tablet Take 1 tablet by mouth once daily.    metFORMIN (GLUCOPHAGE-XR) 500 MG ER 24hr tablet Take 500 mg by mouth 2 (two) times daily with meals. Lunch and supper    pioglitazone (ACTOS) 30 MG tablet Take 1 tablet (30 mg total) by mouth once daily.              Hypertension Medications               amLODIPine (NORVASC) 5 MG tablet Take 1 tablet (5 mg total) by mouth once daily.    benazepriL (LOTENSIN) 40 MG tablet Take 1 tablet (40 mg total) by mouth once daily.    hydroCHLOROthiazide (HYDRODIURIL) 25 MG tablet Take 1 tablet (25 mg total) by mouth once daily.              Hyperlipidemia Medications               pravastatin (PRAVACHOL) 20 MG tablet Take 1 tablet (20 mg total) by mouth once daily.               LAST FILLED benazepril: 1/3/24 for 90 day supply  LAST FILLED pravastatin: 1/3/24 for 90 day supply   LAST FILLED pioglitazone: 1/3/24 for 90 day supply    4.  Reviewed and or Updates Made To: Patient Chart  5. Outreach Outcomes and/or actions taken: Sent inquiry to patient: Waiting for response  Additional Notes:   Called patient- left JULIO

## 2024-10-04 ENCOUNTER — LAB VISIT (OUTPATIENT)
Dept: LAB | Facility: HOSPITAL | Age: 69
End: 2024-10-04
Attending: FAMILY MEDICINE
Payer: COMMERCIAL

## 2024-10-04 DIAGNOSIS — E11.59 HYPERTENSION ASSOCIATED WITH DIABETES: ICD-10-CM

## 2024-10-04 DIAGNOSIS — Z79.899 ENCOUNTER FOR LONG-TERM (CURRENT) USE OF OTHER MEDICATIONS: ICD-10-CM

## 2024-10-04 DIAGNOSIS — I15.2 HYPERTENSION ASSOCIATED WITH DIABETES: ICD-10-CM

## 2024-10-04 DIAGNOSIS — E11.65 TYPE 2 DIABETES MELLITUS WITH HYPERGLYCEMIA, UNSPECIFIED WHETHER LONG TERM INSULIN USE: ICD-10-CM

## 2024-10-04 LAB
ALBUMIN SERPL BCP-MCNC: 4 G/DL (ref 3.5–5.2)
ALP SERPL-CCNC: 87 U/L (ref 55–135)
ALT SERPL W/O P-5'-P-CCNC: 16 U/L (ref 10–44)
ANION GAP SERPL CALC-SCNC: 7 MMOL/L (ref 8–16)
AST SERPL-CCNC: 32 U/L (ref 10–40)
BILIRUB SERPL-MCNC: 1.1 MG/DL (ref 0.1–1)
BUN SERPL-MCNC: 11 MG/DL (ref 8–23)
CALCIUM SERPL-MCNC: 9.7 MG/DL (ref 8.7–10.5)
CHLORIDE SERPL-SCNC: 111 MMOL/L (ref 95–110)
CHOLEST SERPL-MCNC: 126 MG/DL (ref 120–199)
CHOLEST/HDLC SERPL: 2.5 {RATIO} (ref 2–5)
CO2 SERPL-SCNC: 23 MMOL/L (ref 23–29)
CREAT SERPL-MCNC: 1 MG/DL (ref 0.5–1.4)
EST. GFR  (NO RACE VARIABLE): >60 ML/MIN/1.73 M^2
ESTIMATED AVG GLUCOSE: 134 MG/DL (ref 68–131)
GLUCOSE SERPL-MCNC: 110 MG/DL (ref 70–110)
HBA1C MFR BLD: 6.3 % (ref 4–5.6)
HDLC SERPL-MCNC: 51 MG/DL (ref 40–75)
HDLC SERPL: 40.5 % (ref 20–50)
LDLC SERPL CALC-MCNC: 66.6 MG/DL (ref 63–159)
NONHDLC SERPL-MCNC: 75 MG/DL
POTASSIUM SERPL-SCNC: 4.7 MMOL/L (ref 3.5–5.1)
PROT SERPL-MCNC: 7.5 G/DL (ref 6–8.4)
SODIUM SERPL-SCNC: 141 MMOL/L (ref 136–145)
TRIGL SERPL-MCNC: 42 MG/DL (ref 30–150)
VIT B12 SERPL-MCNC: 205 PG/ML (ref 210–950)

## 2024-10-04 PROCEDURE — 80053 COMPREHEN METABOLIC PANEL: CPT | Performed by: FAMILY MEDICINE

## 2024-10-04 PROCEDURE — 82607 VITAMIN B-12: CPT | Performed by: FAMILY MEDICINE

## 2024-10-04 PROCEDURE — 80061 LIPID PANEL: CPT | Performed by: FAMILY MEDICINE

## 2024-10-04 PROCEDURE — 83036 HEMOGLOBIN GLYCOSYLATED A1C: CPT | Performed by: FAMILY MEDICINE

## 2024-10-04 PROCEDURE — 36415 COLL VENOUS BLD VENIPUNCTURE: CPT | Mod: PO | Performed by: FAMILY MEDICINE

## 2024-10-10 ENCOUNTER — OFFICE VISIT (OUTPATIENT)
Dept: DIABETES | Facility: CLINIC | Age: 69
End: 2024-10-10
Payer: COMMERCIAL

## 2024-10-10 VITALS
HEART RATE: 54 BPM | WEIGHT: 171.94 LBS | SYSTOLIC BLOOD PRESSURE: 148 MMHG | BODY MASS INDEX: 23.32 KG/M2 | DIASTOLIC BLOOD PRESSURE: 80 MMHG

## 2024-10-10 DIAGNOSIS — E11.65 TYPE 2 DIABETES MELLITUS WITH HYPERGLYCEMIA, UNSPECIFIED WHETHER LONG TERM INSULIN USE: Primary | ICD-10-CM

## 2024-10-10 LAB — GLUCOSE SERPL-MCNC: 167 MG/DL (ref 70–110)

## 2024-10-10 PROCEDURE — 82962 GLUCOSE BLOOD TEST: CPT | Mod: S$GLB,,, | Performed by: NURSE PRACTITIONER

## 2024-10-10 PROCEDURE — 99999 PR PBB SHADOW E&M-EST. PATIENT-LVL IV: CPT | Mod: PBBFAC,,, | Performed by: NURSE PRACTITIONER

## 2024-10-10 RX ORDER — PIOGLITAZONEHYDROCHLORIDE 15 MG/1
15 TABLET ORAL DAILY
Qty: 90 TABLET | Refills: 1 | Status: SHIPPED | OUTPATIENT
Start: 2024-10-10 | End: 2025-04-08

## 2024-10-10 NOTE — PATIENT INSTRUCTIONS
PATIENT INSTRUCTIONS     Increase Jardiance to 25 mg by mouth daily.  Continue Glucophage XR 1000 mg by mouth daily.    Decrease Actos to15 mg by mouth daily.      Continue Katheryn 2 CGM  Blood Sugar Goals:       Fastin-130.       1-2 hours after a meal: Less than 180.

## 2024-10-10 NOTE — PROGRESS NOTES
Misha Simpson is a 69 y.o. male who  has a past medical history of Adrenal adenoma (2008), Arthritis, Bronchiectasis (05/15/2012), Cataract, COPD, mild (10/02/2017), COVID-19 (05/23/2022), Diabetes mellitus, type 2, Emphysema, Hemoptysis, Hyperlipidemia, Hyperplastic colon polyp, Hypertension, Lung nodules, Lymphocytosis, Peptic ulcer disease, Pneumonia, and Tobacco abuse (05/15/2012). and presents for a follow up evaluation of Type 2 diabetes mellitus.     CHIEF COMPLAINT: Diabetes Consultation    PCP: Trino Snyder MD     Initial visit with me - 2/15/2024    The patient was initially diagnosed with diabetes in 2002.      Previous failed treatments include:  NONE     Social Documentation:  Patient lives in [place].   Occupation: .   Exercise: no formal exercise.      Diabetes related complications:   none.   denies Pancreatitis  denies Gastroparesis  denies DKA  denies Hx/family Hx of MEN2/MTC  denies Frequent UTIs/yeast infections     Cardiovascular Risk Factors: advanced age (>55 for men, >65 for women), dyslipidemia, hypertension, male gender, and smoking/tobacco exposure.    Diabetes Medications               empagliflozin (JARDIANCE) 10 mg tablet Take 1 tablet by mouth once daily.    metFORMIN (GLUCOPHAGE-XR) 500 MG ER 24hr tablet Take 500 mg by mouth 2 (two) times daily with meals. Lunch and supper    pioglitazone (ACTOS) 30 MG tablet Take 1 tablet (30 mg total) by mouth once daily.     Current monitoring regimen:  Katheryn 2 CGM     The patient's [employer]yn CGM was downloaded and reviewed. For 14 days, patient average glucose was 140 mg/dL. He was above range 14% of the time, in range 86% of the time, and below range 0% of the time. The target range for this patient was 70 - 180 mg/dL. Overall, there was a pattern of mild post prandial hyperglycemia.        Patient currently taking insulin or sulfonylurea?  Yes. Emergency Glucagon Prescription current? no  Recent hypoglycemic  "episodes: No.     Patient compliant with glucose checks and medication administration? Yes    DIABETES MANAGEMENT STATUS  Statin: Taking  ACE/ARB: Taking  Screening or Prevention Patient's value Goal Complete/Controlled?   HgA1C Testing and Control   Lab Results   Component Value Date    HGBA1C 6.3 (H) 10/04/2024      Annually/Less than 8% Yes   Lipid profile : 10/04/2024 Annually Yes   LDL control Lab Results   Component Value Date    LDLCALC 66.6 10/04/2024    Annually/Less than 100 mg/dl  Yes   Nephropathy screening Lab Results   Component Value Date    LABMICR <5.0 10/04/2024     No results found for: "PROTEINUA"  No results found for: "UTPCR"   Annually Yes   Blood pressure BP Readings from Last 1 Encounters:   10/10/24 (!) 148/80    Less than 140/90 Yes   Dilated retinal exam : 11/08/2023 Annually Yes   Foot exam   : 01/05/2024 Annually Yes   Patient's medications, allergies, surgical, social and family histories were reviewed and updated as appropriate.     Review of Systems   Constitutional:  Negative for weight loss.   Eyes:  Negative for blurred vision and double vision.   Cardiovascular:  Negative for chest pain.   Gastrointestinal:  Negative for nausea and vomiting.   Genitourinary:  Negative for frequency.   Musculoskeletal:  Negative for falls.   Neurological:  Negative for dizziness and weakness.   Endo/Heme/Allergies:  Negative for polydipsia.   Psychiatric/Behavioral:  Negative for depression.    All other systems reviewed and are negative.       Physical Exam  Constitutional:       General: He is not in acute distress.     Appearance: Normal appearance.   Eyes:      Extraocular Movements: Extraocular movements intact.      Pupils: Pupils are equal, round, and reactive to light.   Cardiovascular:      Rate and Rhythm: Normal rate and regular rhythm.      Heart sounds: Normal heart sounds.   Pulmonary:      Effort: Pulmonary effort is normal. No respiratory distress.      Breath sounds: Normal breath " "sounds.   Musculoskeletal:      Cervical back: Normal range of motion and neck supple.   Neurological:      Mental Status: He is alert and oriented to person, place, and time.   Psychiatric:         Mood and Affect: Mood normal.         Behavior: Behavior normal.        Blood pressure (!) 148/80, pulse (!) 54, weight 78 kg (171 lb 15.3 oz).  Wt Readings from Last 3 Encounters:   10/10/24 78 kg (171 lb 15.3 oz)   06/20/24 78.1 kg (172 lb 3.2 oz)   06/05/24 79.6 kg (175 lb 8 oz)       LAB REVIEW  Lab Results   Component Value Date     10/04/2024    K 4.7 10/04/2024     (H) 10/04/2024    CO2 23 10/04/2024    BUN 11 10/04/2024    CREATININE 1.0 10/04/2024    CALCIUM 9.7 10/04/2024    ANIONGAP 7 (L) 10/04/2024    EGFRNORACEVR >60.0 10/04/2024     No results found for: "CPEPTIDE", "GLUTAMICACID", "INSLNABS"  Hemoglobin A1C   Date Value Ref Range Status   10/04/2024 6.3 (H) 4.0 - 5.6 % Final     Comment:     ADA Screening Guidelines:  5.7-6.4%  Consistent with prediabetes  >or=6.5%  Consistent with diabetes    High levels of fetal hemoglobin interfere with the HbA1C  assay. Heterozygous hemoglobin variants (HbS, HgC, etc)do  not significantly interfere with this assay.   However, presence of multiple variants may affect accuracy.     07/30/2024 7.0 (H) 4.7 - 5.6 % Final   07/05/2024 6.8 (H) 4.0 - 5.6 % Final     Comment:     ADA Screening Guidelines:  5.7-6.4%  Consistent with prediabetes  >or=6.5%  Consistent with diabetes    High levels of fetal hemoglobin interfere with the HbA1C  assay. Heterozygous hemoglobin variants (HbS, HgC, etc)do  not significantly interfere with this assay.   However, presence of multiple variants may affect accuracy.     04/05/2024 7.0 (H) 4.0 - 5.6 % Final     Comment:     ADA Screening Guidelines:  5.7-6.4%  Consistent with prediabetes  >or=6.5%  Consistent with diabetes    High levels of fetal hemoglobin interfere with the HbA1C  assay. Heterozygous hemoglobin variants (HbS, " HgC, etc)do  not significantly interfere with this assay.   However, presence of multiple variants may affect accuracy.         Lab Results   Component Value Date    POCGLU 167 (A) 10/10/2024       ASSESSMENT    ICD-10-CM ICD-9-CM   1. Type 2 diabetes mellitus with hyperglycemia, unspecified whether long term insulin use  E11.65 250.00       ZAKIYA Cabral was seen today for diabetes mellitus.    Diagnoses and all orders for this visit:    Type 2 diabetes mellitus with hyperglycemia, unspecified whether long term insulin use  -     POCT Glucose, Hand-Held Device  -     empagliflozin (JARDIANCE) 25 mg tablet; Take 1 tablet (25 mg total) by mouth once daily.  -     pioglitazone (ACTOS) 15 MG tablet; Take 1 tablet (15 mg total) by mouth once daily.        Reviewed pathophysiology of diabetes, complications related to the disease, importance of annual dilated eye exam and daily foot examination. Explained MOA, SE, dosage of medications. Written instructions given and reviewed with patient and patient verbalizes understanding.     PATIENT INSTRUCTIONS     Increase Jardiance to 25 mg by mouth daily.  Continue Glucophage XR 1000 mg by mouth daily.    Decrease Actos to15 mg by mouth daily.      Continue Katheryn 2 CGM  Blood Sugar Goals:       Fastin-130.       1-2 hours after a meal: Less than 180.     Follow up in about 3 months (around 1/10/2025) for Katheryn.

## 2024-10-23 PROBLEM — E53.8 B12 DEFICIENCY: Status: ACTIVE | Noted: 2024-10-23

## 2024-11-20 ENCOUNTER — OFFICE VISIT (OUTPATIENT)
Dept: FAMILY MEDICINE | Facility: CLINIC | Age: 69
End: 2024-11-20
Payer: COMMERCIAL

## 2024-11-20 VITALS
HEART RATE: 60 BPM | BODY MASS INDEX: 23.19 KG/M2 | WEIGHT: 171.19 LBS | HEIGHT: 72 IN | TEMPERATURE: 98 F | OXYGEN SATURATION: 98 % | DIASTOLIC BLOOD PRESSURE: 70 MMHG | SYSTOLIC BLOOD PRESSURE: 144 MMHG

## 2024-11-20 DIAGNOSIS — J47.9 BRONCHIECTASIS WITHOUT COMPLICATION: ICD-10-CM

## 2024-11-20 DIAGNOSIS — E11.59 HYPERTENSION ASSOCIATED WITH DIABETES: ICD-10-CM

## 2024-11-20 DIAGNOSIS — J06.9 UPPER RESPIRATORY TRACT INFECTION, UNSPECIFIED TYPE: Primary | ICD-10-CM

## 2024-11-20 DIAGNOSIS — I15.2 HYPERTENSION ASSOCIATED WITH DIABETES: ICD-10-CM

## 2024-11-20 PROCEDURE — 3077F SYST BP >= 140 MM HG: CPT | Mod: CPTII,S$GLB,, | Performed by: FAMILY MEDICINE

## 2024-11-20 PROCEDURE — 1101F PT FALLS ASSESS-DOCD LE1/YR: CPT | Mod: CPTII,S$GLB,, | Performed by: FAMILY MEDICINE

## 2024-11-20 PROCEDURE — 3288F FALL RISK ASSESSMENT DOCD: CPT | Mod: CPTII,S$GLB,, | Performed by: FAMILY MEDICINE

## 2024-11-20 PROCEDURE — G2211 COMPLEX E/M VISIT ADD ON: HCPCS | Mod: S$GLB,,, | Performed by: FAMILY MEDICINE

## 2024-11-20 PROCEDURE — 3044F HG A1C LEVEL LT 7.0%: CPT | Mod: CPTII,S$GLB,, | Performed by: FAMILY MEDICINE

## 2024-11-20 PROCEDURE — 3072F LOW RISK FOR RETINOPATHY: CPT | Mod: CPTII,S$GLB,, | Performed by: FAMILY MEDICINE

## 2024-11-20 PROCEDURE — 99213 OFFICE O/P EST LOW 20 MIN: CPT | Mod: S$GLB,,, | Performed by: FAMILY MEDICINE

## 2024-11-20 PROCEDURE — 3061F NEG MICROALBUMINURIA REV: CPT | Mod: CPTII,S$GLB,, | Performed by: FAMILY MEDICINE

## 2024-11-20 PROCEDURE — 4010F ACE/ARB THERAPY RXD/TAKEN: CPT | Mod: CPTII,S$GLB,, | Performed by: FAMILY MEDICINE

## 2024-11-20 PROCEDURE — 99999 PR PBB SHADOW E&M-EST. PATIENT-LVL V: CPT | Mod: PBBFAC,,, | Performed by: FAMILY MEDICINE

## 2024-11-20 PROCEDURE — 3066F NEPHROPATHY DOC TX: CPT | Mod: CPTII,S$GLB,, | Performed by: FAMILY MEDICINE

## 2024-11-20 PROCEDURE — 3008F BODY MASS INDEX DOCD: CPT | Mod: CPTII,S$GLB,, | Performed by: FAMILY MEDICINE

## 2024-11-20 PROCEDURE — 3078F DIAST BP <80 MM HG: CPT | Mod: CPTII,S$GLB,, | Performed by: FAMILY MEDICINE

## 2024-11-20 PROCEDURE — 1159F MED LIST DOCD IN RCRD: CPT | Mod: CPTII,S$GLB,, | Performed by: FAMILY MEDICINE

## 2024-11-20 RX ORDER — BENZONATATE 200 MG/1
200 CAPSULE ORAL 3 TIMES DAILY PRN
Qty: 30 CAPSULE | Refills: 0 | Status: SHIPPED | OUTPATIENT
Start: 2024-11-20 | End: 2024-11-30

## 2024-11-20 NOTE — PROGRESS NOTES
History of Present Illness    Mr. Simpson has been ill for approximately 2 days with congestion, sinus-related symptoms, and phlegm that is not yet yellow. The cough is intermittent but worsens when supine, affecting sleep. He denies fever. Mr. Simpson has been taking Tylenol for symptom relief and B12 daily. He cannot recall contact with anyone ill. Blood pressure is elevated during this visit. Recent COVID-19 and influenza tests returned negative results.      ROS:  General: -fever  ENT: +nasal congestion  Respiratory: +cough           Misha was seen today for cough and generalized body aches.    Diagnoses and all orders for this visit:    Upper respiratory tract infection, unspecified type  -     POCT COVID-19 Rapid Screening; Future  -     POCT Influenza A/B  -     POCT COVID-19 Rapid Screening    Bronchiectasis without complication    Hypertension associated with diabetes    Other orders  -     benzonatate (TESSALON) 200 MG capsule; Take 1 capsule (200 mg total) by mouth 3 (three) times daily as needed for Cough.        Assessment & Plan    Assessed patient with viral upper respiratory infection, likely not COVID or flu based on negative test results  Noted elevated blood pressure, potentially due to current illness  Will reassess blood pressure in 2 weeks before considering medication adjustment    - Discussed signs to watch for, including fever beyond next few days or onset of wheezing.  - Mr. Simpson to monitor for worsening symptoms, particularly fever or wheezing.  - Started cough pills (Tessalon Pearls) up to 3 times daily.  - Continued Coricidin as needed, noting it may cause drowsiness.  - Continued Tylenol as needed for symptom relief.  - Contact the office if symptoms worsen, fever develops past next few days, or wheezing occurs.  - Provided work excuse note for today and tomorrow.    HYPERTENSION:  - Continued current blood pressure medication.  - Follow up with nurse for blood pressure check in 2  weeks.              Past Medical History:  Past Medical History:   Diagnosis Date    Adrenal adenoma 2008    bx benign    Arthritis     B12 deficiency 10/23/2024    Bronchiectasis 05/15/2012    Cataract     COPD, mild 10/02/2017    COVID-19 05/23/2022    [facility] [address] [place]    Diabetes mellitus, type 2     Emphysema     Hemoptysis     Hyperlipidemia     Hyperplastic colon polyp     Hypertension     Lung nodules     small stable on serial ct    Lymphocytosis     reactive    Peptic ulcer disease     Pneumonia     Tobacco abuse 05/15/2012     Past Surgical History:   Procedure Laterality Date    APPENDECTOMY      BONE MARROW BIOPSY      BRONCHOSCOPY      COLONOSCOPY  08/06/2013         KNEE ARTHROSCOPY Left     MULTIPLE TOOTH EXTRACTIONS      NASAL FRACTURE SURGERY      IL CLOSED TX NASAL BONE FX W/MNPJ W/STABILIZATION     Review of patient's allergies indicates:   Allergen Reactions    Aspirin      Other reaction(s): Stomach upset  Other reaction(s): Nausea    Aspirin Other (See Comments)     Stomach problems and heart burn    Other reaction(s): Stomach upset Other reaction(s): Nausea    Hydrocodone Itching and Rash     Current Outpatient Medications on File Prior to Visit   Medication Sig Dispense Refill    acetaminophen (TYLENOL) 500 MG tablet Take 2 tablets (1,000 mg total) by mouth every 6 (six) hours as needed for Pain.      albuterol (PROVENTIL/VENTOLIN HFA) 90 mcg/actuation inhaler Inhale 2 puffs into the lungs every 6 (six) hours as needed.      ALBUTEROL INHL Inhale into the lungs as needed.       amLODIPine (NORVASC) 5 MG tablet Take 1 tablet (5 mg total) by mouth once daily. 90 tablet 1    benazepriL (LOTENSIN) 40 MG tablet Take 1 tablet (40 mg total) by mouth once daily. 90 tablet 1    blood sugar diagnostic (BLOOD GLUCOSE TEST) Strp Test glucose 1 x daily (use insurance preferred brand) 50 strip 11    fluticasone propionate (FLONASE) 50 mcg/actuation nasal spray 1 spray by Each  "Nostril route once daily.      FREESTYLE RAMA 2 SENSOR Kit Uses to check Blood sugar once a day 1 kit 11    metFORMIN (GLUCOPHAGE-XR) 500 MG ER 24hr tablet Take 500 mg by mouth 2 (two) times daily with meals. Lunch and supper      methocarbamoL (ROBAXIN) 500 MG Tab       pioglitazone (ACTOS) 15 MG tablet Take 1 tablet (15 mg total) by mouth once daily. 90 tablet 1    pravastatin (PRAVACHOL) 20 MG tablet Take 1 tablet (20 mg total) by mouth once daily. 90 tablet 1    sodium chloride 3% 3 % nebulizer solution Inhale 4 mLs into the lungs.      VENTOLIN HFA 90 mcg/actuation inhaler INHALE TWO PUFFS EVERY 4 TO 6 HOURS AS NEEDED 18 g 2    blood-glucose meter Misc Use as directed 1 each prn    empagliflozin (JARDIANCE) 25 mg tablet Take 1 tablet (25 mg total) by mouth once daily. 90 tablet 3    hydroCHLOROthiazide (HYDRODIURIL) 25 MG tablet Take 1 tablet (25 mg total) by mouth once daily. 90 tablet 1    lancets Misc As directed 100 each prn    pen needle, diabetic 32 gauge x 5/32" Ndle Use once daily as directed 100 each 3     No current facility-administered medications on file prior to visit.     Social History     Socioeconomic History    Marital status:    Tobacco Use    Smoking status: Some Days     Current packs/day: 0.00     Average packs/day: 0.3 packs/day for 53.0 years (13.3 ttl pk-yrs)     Types: Cigarettes, Pipe     Start date: 1965     Last attempt to quit: 2018     Years since quittin.9     Passive exposure: Past    Smokeless tobacco: Never    Tobacco comments:     1 pack every 3 days   Substance and Sexual Activity    Alcohol use: No     Alcohol/week: 1.0 standard drink of alcohol     Types: 1 Cans of beer per week     Comment: 1 beer every 2 weeks    Drug use: No    Sexual activity: Yes   Social History Narrative    ** Merged History Encounter **          Social Drivers of Health     Financial Resource Strain: Low Risk  (2024)    Received from [facility]    Overall Financial " Resource Strain (CARDIA)     Difficulty of Paying Living Expenses: Not very hard   Transportation Needs: No Transportation Needs (9/17/2024)    Received from [facility]    PRAPARE - Transportation     Lack of Transportation (Medical): No     Lack of Transportation (Non-Medical): No   Stress: No Stress Concern Present (10/9/2024)    Received from [facility]    Citizen of [place] [employer] [employer] - Occupational Stress Questionnaire     Feeling of Stress : Not at all     Family History   Problem Relation Name Age of Onset    Kidney failure Father      Diabetes Father      Heart disease Mother  60    Diabetes Brother Keith     Cancer Brother Keith         prostate    Glaucoma Brother Keith         ?    Cancer Brother      Amblyopia Neg Hx      Blindness Neg Hx      Cataracts Neg Hx      Hypertension Neg Hx      Macular degeneration Neg Hx      Retinal detachment Neg Hx      Strabismus Neg Hx      Stroke Neg Hx      Thyroid disease Neg Hx         Vitals:    11/20/24 0858 11/20/24 0942   BP: (!) 150/73 (!) 144/70   Pulse: 60    Temp: 97.9 °F (36.6 °C)    TempSrc: Temporal    SpO2: 98%    Weight: 77.7 kg (171 lb 3.2 oz)    Height: 6' (1.829 m)        Wt Readings from Last 3 Encounters:   11/20/24 77.7 kg (171 lb 3.2 oz)   10/10/24 78 kg (171 lb 15.3 oz)   06/20/24 78.1 kg (172 lb 3.2 oz)       APPEARANCE: Well nourished, well developed, in no acute distress.    HEAD: Normocephalic.  Atraumatic.  Sinuses nontender  Ears:  Tympanic membranes intact.  No effusion or erythema.  Nose clear  Throat no erythema or exudate  EYES:   Right eye: Pupil reactive.  Conjunctiva clear.    Left eye: Pupil reactive.  Conjunctiva clear.    NECK: Supple. No bruits.  No JVD.  No cervical lymphadenopathy.  No thyromegaly.    CHEST: Breath sounds clear bilaterally.  Normal respiratory effort  CARDIOVASCULAR: Normal rate.  Regular rhythm.  No murmurs.  No rub.  No gallops.   No edema.  MENTAL STATUS: Alert.  Oriented x 3.

## 2024-12-05 ENCOUNTER — CLINICAL SUPPORT (OUTPATIENT)
Dept: FAMILY MEDICINE | Facility: CLINIC | Age: 69
End: 2024-12-05
Payer: COMMERCIAL

## 2024-12-05 VITALS — DIASTOLIC BLOOD PRESSURE: 76 MMHG | SYSTOLIC BLOOD PRESSURE: 148 MMHG | HEART RATE: 50 BPM

## 2024-12-05 DIAGNOSIS — Z01.30 BP CHECK: Primary | ICD-10-CM

## 2024-12-05 PROCEDURE — 99999 PR PBB SHADOW E&M-EST. PATIENT-LVL III: CPT | Mod: PBBFAC,,,

## 2024-12-05 NOTE — PROGRESS NOTES
Pt in clinic for BP check. /71 HR 50. Pt asymptomatic. Pt sat in clinic 5 minutes prior to rechecking BP. Manual /76. PCP notified by Epic message.

## 2024-12-06 NOTE — PROGRESS NOTES
Spoke with patient about this. Appointment made. Will bring all medication when he comes. Updated med list. States he is taking all these medications.

## 2024-12-06 NOTE — PROGRESS NOTES
His blood pressure is elevated.  It looks like he just saw someone at [facility] who increased his amlodipine up to 10 mg daily 12/4.  Please see if he has been taking his benazepril and hydrochlorothiazide as I am not sure that these are getting filled.  Please update medication list.I would recommend taking medications prescribed with the increase dose on amlodipine as was already done and then following up in a month and bring in all medications.

## 2025-01-09 ENCOUNTER — OFFICE VISIT (OUTPATIENT)
Dept: DIABETES | Facility: CLINIC | Age: 70
End: 2025-01-09
Payer: COMMERCIAL

## 2025-01-09 VITALS
HEART RATE: 67 BPM | HEIGHT: 72 IN | SYSTOLIC BLOOD PRESSURE: 157 MMHG | BODY MASS INDEX: 22.99 KG/M2 | WEIGHT: 169.75 LBS | DIASTOLIC BLOOD PRESSURE: 97 MMHG

## 2025-01-09 DIAGNOSIS — E11.65 TYPE 2 DIABETES MELLITUS WITH HYPERGLYCEMIA, UNSPECIFIED WHETHER LONG TERM INSULIN USE: Primary | ICD-10-CM

## 2025-01-09 LAB — GLUCOSE SERPL-MCNC: 104 MG/DL (ref 70–110)

## 2025-01-09 PROCEDURE — 95251 CONT GLUC MNTR ANALYSIS I&R: CPT | Mod: S$GLB,,, | Performed by: NURSE PRACTITIONER

## 2025-01-09 PROCEDURE — 82962 GLUCOSE BLOOD TEST: CPT | Mod: S$GLB,,, | Performed by: NURSE PRACTITIONER

## 2025-01-09 PROCEDURE — 1159F MED LIST DOCD IN RCRD: CPT | Mod: CPTII,S$GLB,, | Performed by: NURSE PRACTITIONER

## 2025-01-09 PROCEDURE — 1101F PT FALLS ASSESS-DOCD LE1/YR: CPT | Mod: CPTII,S$GLB,, | Performed by: NURSE PRACTITIONER

## 2025-01-09 PROCEDURE — 3288F FALL RISK ASSESSMENT DOCD: CPT | Mod: CPTII,S$GLB,, | Performed by: NURSE PRACTITIONER

## 2025-01-09 PROCEDURE — 3008F BODY MASS INDEX DOCD: CPT | Mod: CPTII,S$GLB,, | Performed by: NURSE PRACTITIONER

## 2025-01-09 PROCEDURE — 99214 OFFICE O/P EST MOD 30 MIN: CPT | Mod: S$GLB,,, | Performed by: NURSE PRACTITIONER

## 2025-01-09 PROCEDURE — 99999 PR PBB SHADOW E&M-EST. PATIENT-LVL V: CPT | Mod: PBBFAC,,, | Performed by: NURSE PRACTITIONER

## 2025-01-09 PROCEDURE — 1126F AMNT PAIN NOTED NONE PRSNT: CPT | Mod: CPTII,S$GLB,, | Performed by: NURSE PRACTITIONER

## 2025-01-09 PROCEDURE — 3080F DIAST BP >= 90 MM HG: CPT | Mod: CPTII,S$GLB,, | Performed by: NURSE PRACTITIONER

## 2025-01-09 PROCEDURE — G2211 COMPLEX E/M VISIT ADD ON: HCPCS | Mod: S$GLB,,, | Performed by: NURSE PRACTITIONER

## 2025-01-09 PROCEDURE — 3077F SYST BP >= 140 MM HG: CPT | Mod: CPTII,S$GLB,, | Performed by: NURSE PRACTITIONER

## 2025-01-09 NOTE — PATIENT INSTRUCTIONS
PATIENT INSTRUCTIONS     Lifestyle modification with well balanced diet and at least 30 minutes of physical activity daily recommended.   Increase water intake.   Increase protein and non-starchy vegetable servings with meals.   Decrease carbohydrate servings with meals.   Take 10 minute walk after each meal.   Avoid snacking on carbohydrates, choose low carb, high protein snacks.   Incorporate resistance training with weights or resistance bands with exercise regimen at least 3 days a week.      Continue Jardiance to 25 mg by mouth daily.  Continue Glucophage XR 1000 mg by mouth daily.    Continue Actos to15 mg by mouth daily.      Continue Katheryn 2 CGM  Blood Sugar Goals:       Fastin-130.       1-2 hours after a meal: Less than 180.

## 2025-01-09 NOTE — PROGRESS NOTES
Misha Simpson is a 69 y.o. male who  has a past medical history of Adrenal adenoma (2008), Arthritis, B12 deficiency (10/23/2024), Bronchiectasis (05/15/2012), Cataract, COPD, mild (10/02/2017), COVID-19 (05/23/2022), Diabetes mellitus, type 2, Emphysema, Hemoptysis, Hyperlipidemia, Hyperplastic colon polyp, Hypertension, Lung nodules, Lymphocytosis, Peptic ulcer disease, Pneumonia, and Tobacco abuse (05/15/2012). and presents for a follow up evaluation of Type 2 diabetes mellitus.     CHIEF COMPLAINT: Diabetes Consultation    PCP: Trino Snyder MD     Initial visit with me - 2/15/2024    The patient was initially diagnosed with diabetes in 2002.      Previous failed treatments include:  NONE     Social Documentation:  Patient lives in [place].   Occupation: .   Exercise: no formal exercise.      Diabetes related complications:   none.   denies Pancreatitis  denies Gastroparesis  denies DKA  denies Hx/family Hx of MEN2/MTC  denies Frequent UTIs/yeast infections     Cardiovascular Risk Factors: advanced age (>55 for men, >65 for women), dyslipidemia, hypertension, male gender, and smoking/tobacco exposure.    Diabetes Medications               empagliflozin (JARDIANCE) 25 mg tablet Take 1 tablet (25 mg total) by mouth once daily.    metFORMIN (GLUCOPHAGE-XR) 500 MG ER 24hr tablet Take 500 mg by mouth 2 (two) times daily with meals. Lunch and supper    pioglitazone (ACTOS) 15 MG tablet Take 1 tablet (15 mg total) by mouth once daily.     Current monitoring regimen:  Katheryn 2 CGM     The patient's Katheryn CGM was downloaded and reviewed. For 14 days, patient average glucose was 139 mg/dL. He was above range 19% of the time, in range 81% of the time, and below range 0% of the time. The target range for this patient was 70 - 180 mg/dL. Overall, there was a pattern of post prandial hyperglycemia. .            Patient currently taking insulin or sulfonylurea?  Yes. Emergency Glucagon  "Prescription current? no  Recent hypoglycemic episodes: No.     Patient compliant with glucose checks and medication administration? Yes    DIABETES MANAGEMENT STATUS  Statin: Taking  ACE/ARB: Taking  Screening or Prevention Patient's value Goal Complete/Controlled?   HgA1C Testing and Control   Lab Results   Component Value Date    HGBA1C 7.1 (H) 12/03/2024      Annually/Less than 8% Yes   Lipid profile : 12/03/2024 Annually Yes   LDL control Lab Results   Component Value Date    LDLCALC 65 12/03/2024    Annually/Less than 100 mg/dl  Yes   Nephropathy screening Lab Results   Component Value Date    LABMICR <5.0 10/04/2024     No results found for: "PROTEINUA"  No results found for: "UTPCR"   Annually Yes   Blood pressure BP Readings from Last 1 Encounters:   01/09/25 (!) 157/97    Less than 140/90 Yes   Dilated retinal exam : 11/08/2023 Annually Yes   Foot exam   : 01/05/2024 Annually Yes   Patient's medications, allergies, surgical, social and family histories were reviewed and updated as appropriate.     Review of Systems   Constitutional:  Negative for weight loss.   Eyes:  Negative for blurred vision and double vision.   Cardiovascular:  Negative for chest pain.   Gastrointestinal:  Negative for nausea and vomiting.   Genitourinary:  Negative for frequency.   Musculoskeletal:  Negative for falls.   Neurological:  Negative for dizziness and weakness.   Endo/Heme/Allergies:  Negative for polydipsia.   Psychiatric/Behavioral:  Negative for depression.    All other systems reviewed and are negative.       Physical Exam  Constitutional:       General: He is not in acute distress.     Appearance: Normal appearance.   Eyes:      Extraocular Movements: Extraocular movements intact.      Pupils: Pupils are equal, round, and reactive to light.   Cardiovascular:      Rate and Rhythm: Normal rate and regular rhythm.      Pulses:           Dorsalis pedis pulses are 2+ on the right side and 2+ on the left side.        " "Posterior tibial pulses are 2+ on the right side and 2+ on the left side.      Heart sounds: Normal heart sounds.   Pulmonary:      Effort: Pulmonary effort is normal. No respiratory distress.      Breath sounds: Normal breath sounds.   Musculoskeletal:      Cervical back: Normal range of motion and neck supple.   Feet:      Right foot:      Protective Sensation: 9 sites tested.  9 sites sensed.      Skin integrity: Skin integrity normal.      Toenail Condition: Right toenails are normal.      Left foot:      Protective Sensation: 9 sites tested.  9 sites sensed.      Skin integrity: Skin integrity normal.      Toenail Condition: Left toenails are normal.   Neurological:      Mental Status: He is alert and oriented to person, place, and time.   Psychiatric:         Mood and Affect: Mood normal.         Behavior: Behavior normal.        Blood pressure (!) 157/97, pulse 67, height 6' (1.829 m), weight 77 kg (169 lb 12.1 oz).  Wt Readings from Last 3 Encounters:   01/09/25 77 kg (169 lb 12.1 oz)   11/20/24 77.7 kg (171 lb 3.2 oz)   10/10/24 78 kg (171 lb 15.3 oz)       LAB REVIEW  Lab Results   Component Value Date     10/04/2024    K 4.7 10/04/2024     (H) 10/04/2024    CO2 23 10/04/2024    BUN 11 10/04/2024    CREATININE 1.0 10/04/2024    CALCIUM 9.7 10/04/2024    ANIONGAP 7 (L) 10/04/2024    EGFRNORACEVR >60.0 10/04/2024     No results found for: "CPEPTIDE", "GLUTAMICACID", "INSLNABS"  Hemoglobin A1C   Date Value Ref Range Status   12/03/2024 7.1 (H) 4.7 - 5.6 % Final   10/04/2024 6.3 (H) 4.0 - 5.6 % Final     Comment:     ADA Screening Guidelines:  5.7-6.4%  Consistent with prediabetes  >or=6.5%  Consistent with diabetes    High levels of fetal hemoglobin interfere with the HbA1C  assay. Heterozygous hemoglobin variants (HbS, HgC, etc)do  not significantly interfere with this assay.   However, presence of multiple variants may affect accuracy.     07/30/2024 7.0 (H) 4.7 - 5.6 % Final   07/05/2024 6.8 " "(H) 4.0 - 5.6 % Final     Comment:     ADA Screening Guidelines:  5.7-6.4%  Consistent with prediabetes  >or=6.5%  Consistent with diabetes    High levels of fetal hemoglobin interfere with the HbA1C  assay. Heterozygous hemoglobin variants (HbS, HgC, etc)do  not significantly interfere with this assay.   However, presence of multiple variants may affect accuracy.     04/05/2024 7.0 (H) 4.0 - 5.6 % Final     Comment:     ADA Screening Guidelines:  5.7-6.4%  Consistent with prediabetes  >or=6.5%  Consistent with diabetes    High levels of fetal hemoglobin interfere with the HbA1C  assay. Heterozygous hemoglobin variants (HbS, HgC, etc)do  not significantly interfere with this assay.   However, presence of multiple variants may affect accuracy.         Lab Results   Component Value Date    POCGLU 104 01/09/2025       ASSESSMENT    ICD-10-CM ICD-9-CM   1. Type 2 diabetes mellitus with hyperglycemia, unspecified whether long term insulin use  E11.65 250.00       ZAKIYA  Misha Pathak" was seen today for diabetes mellitus.    Diagnoses and all orders for this visit:    Type 2 diabetes mellitus with hyperglycemia, unspecified whether long term insulin use  -     POCT Glucose, Hand-Held Device        Reviewed pathophysiology of diabetes, complications related to the disease, importance of annual dilated eye exam and daily foot examination. Explained MOA, SE, dosage of medications. Written instructions given and reviewed with patient and patient verbalizes understanding.     PATIENT INSTRUCTIONS     Lifestyle modification with well balanced diet and at least 30 minutes of physical activity daily recommended.   Increase water intake.   Increase protein and non-starchy vegetable servings with meals.   Decrease carbohydrate servings with meals.   Take 10 minute walk after each meal.   Avoid snacking on carbohydrates, choose low carb, high protein snacks.   Incorporate resistance training with weights or resistance bands with " exercise regimen at least 3 days a week.      Continue Jardiance to 25 mg by mouth daily.  Continue Glucophage XR 1000 mg by mouth daily.    Continue Actos to15 mg by mouth daily.      Continue Katheryn 2 CGM  Blood Sugar Goals:       Fastin-130.       1-2 hours after a meal: Less than 180.     No follow-ups on file.

## 2025-01-25 ENCOUNTER — OFFICE VISIT (OUTPATIENT)
Dept: FAMILY MEDICINE | Facility: CLINIC | Age: 70
End: 2025-01-25
Payer: COMMERCIAL

## 2025-01-25 VITALS
TEMPERATURE: 98 F | WEIGHT: 166.38 LBS | BODY MASS INDEX: 22.54 KG/M2 | HEART RATE: 55 BPM | DIASTOLIC BLOOD PRESSURE: 72 MMHG | HEIGHT: 72 IN | SYSTOLIC BLOOD PRESSURE: 144 MMHG

## 2025-01-25 DIAGNOSIS — J43.9 PULMONARY EMPHYSEMA, UNSPECIFIED EMPHYSEMA TYPE: ICD-10-CM

## 2025-01-25 DIAGNOSIS — I15.2 HYPERTENSION ASSOCIATED WITH DIABETES: Primary | ICD-10-CM

## 2025-01-25 DIAGNOSIS — E11.59 HYPERTENSION ASSOCIATED WITH DIABETES: Primary | ICD-10-CM

## 2025-01-25 DIAGNOSIS — D35.02 ADENOMA OF LEFT ADRENAL GLAND: ICD-10-CM

## 2025-01-25 PROCEDURE — G2211 COMPLEX E/M VISIT ADD ON: HCPCS | Mod: S$GLB,,, | Performed by: FAMILY MEDICINE

## 2025-01-25 PROCEDURE — 1126F AMNT PAIN NOTED NONE PRSNT: CPT | Mod: CPTII,S$GLB,, | Performed by: FAMILY MEDICINE

## 2025-01-25 PROCEDURE — 3077F SYST BP >= 140 MM HG: CPT | Mod: CPTII,S$GLB,, | Performed by: FAMILY MEDICINE

## 2025-01-25 PROCEDURE — 99999 PR PBB SHADOW E&M-EST. PATIENT-LVL V: CPT | Mod: PBBFAC,,, | Performed by: FAMILY MEDICINE

## 2025-01-25 PROCEDURE — 99214 OFFICE O/P EST MOD 30 MIN: CPT | Mod: S$GLB,,, | Performed by: FAMILY MEDICINE

## 2025-01-25 PROCEDURE — 1101F PT FALLS ASSESS-DOCD LE1/YR: CPT | Mod: CPTII,S$GLB,, | Performed by: FAMILY MEDICINE

## 2025-01-25 PROCEDURE — 1159F MED LIST DOCD IN RCRD: CPT | Mod: CPTII,S$GLB,, | Performed by: FAMILY MEDICINE

## 2025-01-25 PROCEDURE — 3078F DIAST BP <80 MM HG: CPT | Mod: CPTII,S$GLB,, | Performed by: FAMILY MEDICINE

## 2025-01-25 PROCEDURE — 3008F BODY MASS INDEX DOCD: CPT | Mod: CPTII,S$GLB,, | Performed by: FAMILY MEDICINE

## 2025-01-25 PROCEDURE — 3288F FALL RISK ASSESSMENT DOCD: CPT | Mod: CPTII,S$GLB,, | Performed by: FAMILY MEDICINE

## 2025-01-25 RX ORDER — DEXAMETHASONE 1 MG/1
1 TABLET ORAL ONCE
Qty: 1 TABLET | Refills: 0 | Status: SHIPPED | OUTPATIENT
Start: 2025-01-25 | End: 2025-01-25

## 2025-01-25 RX ORDER — AMLODIPINE BESYLATE 10 MG/1
10 TABLET ORAL DAILY
COMMUNITY

## 2025-01-25 NOTE — PROGRESS NOTES
"Follow-up hypertension  Mr. Simpson reports moderately elevated blood pressure at home.   Mr. Simpson admits to occasionally missing doses of blood pressure medications, estimating once weekly. Examination of prescription refill dates suggests more frequent missed doses. Mr. Simpson takes medications with breakfast, either at home or when stopping to eat, but sometimes misses doses when skipping breakfast.    Mr. Simpson reports urinary frequency of 3-4 times daily and nocturia once nightly. He denies difficulty with urination.    Mr. Simpson had an A1C test on December 7th, resulting in 7.1.  Diabetes being managed by diabetes nurse practitioner.    Mr. Simpson denies snoring.    Previous adrenal nodule stable on multiple previous imaging test.  Appears he has had some prior biochemical evaluation for this but nothing recent.    Pulmonary emphysema stable managed through Pulmonary.  ROS:  ENT: -snoring  Genitourinary: +frequency, +nocturia         Misha Pathak" was seen today for follow-up and hypertension.    Diagnoses and all orders for this visit:    Hypertension associated with diabetes  -     Aldosterone/Renin Activity Ratio; Future  -     DHEA-Sulfate; Future  -     Metanephrines, urine 24 Hours; Future  -     Catecholamines, fractionated, Urine 24 Hours; Future  -     US Renal Artery Stenosis Hyperten (xpd); Future    Pulmonary emphysema, unspecified emphysema type    Adenoma of left adrenal gland  -     Aldosterone/Renin Activity Ratio; Future  -     DHEA-Sulfate; Future  -     Metanephrines, urine 24 Hours; Future  -     Catecholamines, fractionated, Urine 24 Hours; Future  -     US Renal Artery Stenosis Hyperten (xpd); Future    Other orders  -     dexAMETHasone (DECADRON) 1 MG Tab; Take 1 tablet (1 mg total) by mouth once. At 11 PM on the night prior to laboratory test for 1 dose      Will have him continue current medications for now.  Laboratory evaluation as above to include dexamethasone suppression test.  Will " "have him follow-up after that to reassess blood pressure regimen.  Compliance encouraged.          Diabetes Management Status    Statin: Taking  ACE/ARB: Taking    Screening or Prevention Patient's value Goal Complete/Controlled?   HgA1C Testing and Control   Lab Results   Component Value Date    HGBA1C 7.1 (H) 12/03/2024      Annually/Less than 8% Yes   Lipid profile : 12/03/2024 Annually Yes   LDL control Lab Results   Component Value Date    LDLCALC 65 12/03/2024    Annually/Less than 100 mg/dl  Yes   Nephropathy screening Lab Results   Component Value Date    LABMICR <5.0 10/04/2024     No results found for: "PROTEINUA" Annually Yes   Blood pressure BP Readings from Last 1 Encounters:   01/25/25 (!) 144/72    Less than 140/90 No   Dilated retinal exam : 11/08/2023 Annually Yes   Foot exam   : 01/09/2025 Annually Yes       Past Medical History:  Past Medical History:   Diagnosis Date    Adrenal adenoma 2008    bx benign    Arthritis     B12 deficiency 10/23/2024    Bronchiectasis 05/15/2012    Cataract     COPD, mild 10/02/2017    COVID-19 05/23/2022    [facility]    Diabetes mellitus, type 2     Emphysema     Hemoptysis     Hyperlipidemia     Hyperplastic colon polyp     Hypertension     Lung nodules     small stable on serial ct    Lymphocytosis     reactive    Peptic ulcer disease     Pneumonia     Tobacco abuse 05/15/2012     Past Surgical History:   Procedure Laterality Date    APPENDECTOMY      BONE MARROW BIOPSY      BRONCHOSCOPY      COLONOSCOPY  08/06/2013         KNEE ARTHROSCOPY Left     MULTIPLE TOOTH EXTRACTIONS      NASAL FRACTURE SURGERY      GA CLOSED TX NASAL BONE FX W/MNPJ W/STABILIZATION     Review of patient's allergies indicates:   Allergen Reactions    Aspirin      Other reaction(s): Stomach upset  Other reaction(s): Nausea    Aspirin Other (See Comments)     Stomach problems and heart burn    Other reaction(s): Stomach upset Other reaction(s): Nausea    " Hydrocodone Itching and Rash     Current Outpatient Medications on File Prior to Visit   Medication Sig Dispense Refill    acetaminophen (TYLENOL) 500 MG tablet Take 2 tablets (1,000 mg total) by mouth every 6 (six) hours as needed for Pain.      albuterol (PROVENTIL/VENTOLIN HFA) 90 mcg/actuation inhaler Inhale 2 puffs into the lungs every 6 (six) hours as needed.      ALBUTEROL INHL Inhale into the lungs as needed.       benazepriL (LOTENSIN) 40 MG tablet Take 1 tablet (40 mg total) by mouth once daily. 90 tablet 1    empagliflozin (JARDIANCE) 25 mg tablet Take 1 tablet (25 mg total) by mouth once daily. 90 tablet 3    fluticasone propionate (FLONASE) 50 mcg/actuation nasal spray 1 spray by Each Nostril route once daily.      [place] [employer] RAMA 2 SENSOR Kit Uses to check Blood sugar once a day 1 kit 11    lancets Misc As directed 100 each prn    metFORMIN (GLUCOPHAGE-XR) 500 MG ER 24hr tablet Take 500 mg by mouth 2 (two) times daily with meals. Lunch and supper      methocarbamoL (ROBAXIN) 500 MG Tab       pioglitazone (ACTOS) 15 MG tablet Take 1 tablet (15 mg total) by mouth once daily. 90 tablet 1    pravastatin (PRAVACHOL) 20 MG tablet Take 1 tablet (20 mg total) by mouth once daily. 90 tablet 1    sodium chloride 3% 3 % nebulizer solution Inhale 4 mLs into the lungs.      VENTOLIN HFA 90 mcg/actuation inhaler INHALE TWO PUFFS EVERY 4 TO 6 HOURS AS NEEDED 18 g 2    [DISCONTINUED] amLODIPine (NORVASC) 5 MG tablet Take 1 tablet (5 mg total) by mouth once daily. (Patient taking differently: Take 10 mg by mouth once daily.) 90 tablet 1    amLODIPine (NORVASC) 10 MG tablet Take 10 mg by mouth once daily.      blood sugar diagnostic (BLOOD GLUCOSE TEST) Strp Test glucose 1 x daily (use insurance preferred brand) 50 strip 11    blood-glucose meter Misc Use as directed 1 each prn    hydroCHLOROthiazide (HYDRODIURIL) 25 MG tablet Take 1 tablet (25 mg total) by mouth once daily. 90 tablet 1    pen needle, diabetic 32 gauge  "x " Ndle Use once daily as directed 100 each 3     No current facility-administered medications on file prior to visit.     Social History     Socioeconomic History    Marital status:    Tobacco Use    Smoking status: Some Days     Current packs/day: 0.00     Average packs/day: 0.3 packs/day for 53.0 years (13.3 ttl pk-yrs)     Types: Cigarettes, Pipe     Start date: 1965     Last attempt to quit: 2018     Years since quittin.1     Passive exposure: Past    Smokeless tobacco: Never    Tobacco comments:     1 pack every 3 days   Substance and Sexual Activity    Alcohol use: No     Alcohol/week: 1.0 standard drink of alcohol     Types: 1 Cans of beer per week     Comment: 1 beer every 2 weeks    Drug use: No    Sexual activity: Yes   Social History Narrative    ** Merged History Encounter **          Social Drivers of Health     Financial Resource Strain: Low Risk  (2024)    Received from [facility]    Overall Financial Resource Strain (CARDIA)     Difficulty of Paying Living Expenses: Not very hard   Transportation Needs: No Transportation Needs (2024)    Received from [facility]    PRAPARE - Transportation     Lack of Transportation (Medical): No     Lack of Transportation (Non-Medical): No   Stress: No Stress Concern Present (10/9/2024)    Received from [facility]    [employer] - Occupational Stress Questionnaire     Feeling of Stress : Not at all     Family History   Problem Relation Name Age of Onset    Kidney failure Father      Diabetes Father      Heart disease Mother  60    Diabetes Brother Keith     Cancer Brother Keith         prostate    Glaucoma Brother Keith         ?    Cancer Brother      Amblyopia Neg Hx      Blindness Neg Hx      Cataracts Neg Hx      Hypertension Neg Hx      Macular degeneration Neg Hx      Retinal detachment Neg Hx      Strabismus Neg Hx      Stroke Neg Hx      Thyroid disease Neg Hx             ROS:  GENERAL: " No fever, chills,  or significant weight changes.   CARDIOVASCULAR: Denies chest pain, PND, orthopnea or reduced exercise tolerance.  ABDOMEN: Appetite fine. Denies diarrhea, abdominal pain, hematemesis or blood in stool.  URINARY: No flank pain, dysuria or hematuria.    Vitals:    01/25/25 1216 01/25/25 1237   BP: (!) 157/79 (!) 144/72   Pulse: (!) 55    Temp: 97.7 °F (36.5 °C)    TempSrc: Temporal    Weight: 75.5 kg (166 lb 6.4 oz)    Height: 6' (1.829 m)        Wt Readings from Last 3 Encounters:   01/25/25 75.5 kg (166 lb 6.4 oz)   01/09/25 77 kg (169 lb 12.1 oz)   11/20/24 77.7 kg (171 lb 3.2 oz)       APPEARANCE: Well nourished, well developed, in no acute distress.    HEAD: Normocephalic.  Atraumatic.  EYES:   Right eye: Pupil reactive.  Conjunctiva clear.    Left eye: Pupil reactive.  Conjunctiva clear.    NECK: Supple. No bruits.  No JVD.  No cervical lymphadenopathy.  No thyromegaly.    CHEST: Breath sounds clear bilaterally.  Normal respiratory effort  CARDIOVASCULAR: Normal rate.  Regular rhythm.  No murmurs.  No rub.  No gallops.   No edema.  MENTAL STATUS: Alert.  Oriented x 3.

## 2025-01-28 ENCOUNTER — LAB VISIT (OUTPATIENT)
Dept: LAB | Facility: HOSPITAL | Age: 70
End: 2025-01-28
Attending: FAMILY MEDICINE
Payer: COMMERCIAL

## 2025-01-28 DIAGNOSIS — I15.2 HYPERTENSION ASSOCIATED WITH DIABETES: ICD-10-CM

## 2025-01-28 DIAGNOSIS — E11.59 HYPERTENSION ASSOCIATED WITH DIABETES: ICD-10-CM

## 2025-01-28 DIAGNOSIS — D35.02 ADENOMA OF LEFT ADRENAL GLAND: ICD-10-CM

## 2025-01-28 LAB — DHEA-S SERPL-MCNC: 47.3 UG/DL (ref 228.5–283.6)

## 2025-01-28 PROCEDURE — 36415 COLL VENOUS BLD VENIPUNCTURE: CPT | Mod: PO | Performed by: FAMILY MEDICINE

## 2025-01-28 PROCEDURE — 84244 ASSAY OF RENIN: CPT | Performed by: FAMILY MEDICINE

## 2025-01-28 PROCEDURE — 82627 DEHYDROEPIANDROSTERONE: CPT | Performed by: FAMILY MEDICINE

## 2025-01-30 ENCOUNTER — TELEPHONE (OUTPATIENT)
Dept: FAMILY MEDICINE | Facility: CLINIC | Age: 70
End: 2025-01-30
Payer: COMMERCIAL

## 2025-01-30 DIAGNOSIS — D35.02 ADENOMA OF LEFT ADRENAL GLAND: Primary | ICD-10-CM

## 2025-01-30 NOTE — TELEPHONE ENCOUNTER
That is okay, the pill needs to be taken the night prior to checking the 8:00 a.m. cortisol which was not done at that lab appointment.  We do need to go ahead and get an 8:00 a.m. cortisol.  He should take dexamethasone 1 mg at 11:00 p.m. the night prior and had lab work done at 8:00 a.m. the next morning.  8:00 a.m. cortisol order placed.

## 2025-01-30 NOTE — TELEPHONE ENCOUNTER
I went over everything w/ Ms Ruiz (pts wife)   He has an ultra on Monday 2/3 in the afternoon , he needs to fast 8 hrs prior     He needs to take the Dexamethasone at 11 pm on Monday night     He needs to be here to have his blood drawn for 8 am . ( 8am cortisol )     She verbalized understanding .

## 2025-01-30 NOTE — TELEPHONE ENCOUNTER
----- Message from [employer] sent at 1/30/2025  8:49 AM CST -----  Type:  Needs Medical Advice    Who Called: Spouse Clay  Symptoms (please be specific):  How long has patient had these symptoms:    Pharmacy name and phone #:    Would the patient rather a call back or a response via [facility]? call back/  Best Call Back Number: [phone]  Additional Information:Spouse would like to speak to staff in reference to directions for upcoming procedure   Thanks   HISTORY AND PRESENT ILLNESS:  The patient is a 49 year old female coming today for follow-up anxiety and hypothyroidism taking current medication without any problem tolerating well. Also here for follow-up urgent care was seen there for neck pain according to him it is a lot better but it still there it's mainly whenever she turns her neck to the right of the left it's with activity if she sits still there is no pain she denies any radiation to her arms no numbness tingling no injury trauma fall no other concerns  No aggravating or relieving factors, denies any associated signs and symptoms.  No treatment so far.  The rest of the cardiovascular, respiratory, GI, neuro, urinary and musculoskeletal and all other systems are reviewed and are negative.     PROBLEM LIST/PAST MEDICAL HISTORY:  Patient Active Problem List   Diagnosis   • Human immunodeficiency virus (HIV) disease   • Cardiomyopathy, ischemic   • DM w/o complication type II   • Hypertriglyceridemia   • Hypertension   • BiV ICD, Medtronic   • Congestive heart failure   • Chronic renal insufficiency   • Substernal goiter   • Hypoparathyroidism (CMS/HCC)   • Unspecified vitamin D deficiency   • Coronary artery disease   • Hypocalcemia   • Hypothyroidism   • Severe dysplasia of cervix (SHERIN III) - LEEP 12/13 - q 6 mo FU. Pap 6/15/16 normal cytology with negative HPV co-testing   • Depression   • Near syncope   • Anxiety   • Non-small cell lung cancer, left (CMS/HCC)   • COPD, moderate (CMS/HCC)   • Hot flashes   • Vitamin D deficiency   • Calcific shoulder tendinitis   • Thyroid nodule   • History of tobacco abuse   • Surgical hypoparathyroidism (CMS/HCC)   • Post-surgical hypothyroidism   • Snoring, sleep study no JAQUI   • Post-surgical hypoparathyroidism (CMS/HCC)   • Hypothyroidism, postsurgical   • Type 2 diabetes mellitus without complication, with long-term current use of insulin (CMS/HCC)   • Mixed hyperlipidemia   • Postsurgical hypoparathyroidism  (CMS/[facility])       ALLERGIES:   Allergen Reactions   • Coreg [Carvedilol] Other (See Comments)     H/a, PEREZ, chest pressure/pain   • Indocin Nausea & Vomiting   • Niaspan [Niacin (Antihyperlipidemic)] Other (See Comments)     Sore joints, cramping       Current Outpatient Prescriptions   Medication Sig   • cyclobenzaprine (FLEXERIL) 5 MG tablet Take 2 tablets by mouth 3 times daily as needed for Muscle spasms.   • clonazePAM (KLONOPIN) 0.5 MG tablet Take 1 tablet by mouth 2 times daily as needed for Anxiety.   • ANORO ELLIPTA 62.5-25 MCG/INH inhaler INHALE 1 PUFF INTO THE LUNGS DAILY.   • nitroGLYcerin (NITROSTAT) 0.4 MG sublingual tablet PLACE 1 TABLET UNDER THE TONGUE EVERY 5 MINUTES AS NEEDED FOR CHEST PAIN.   • furosemide (LASIX) 20 MG tablet TAKE 1 TABLET BY MOUTH EVERY OTHER DAY. TAKE IN THE MORNING   • BD PEN NEEDLE BRIAN U/F 32G X 4 MM Misc USE WITH INSULIN ONCE A DAY   • HUMALOG KWIKPEN 100 UNIT/ML pen-injector INJECT 12 UNITS WITH EACH MEAL.   • levothyroxine (SYNTHROID, LEVOTHROID) 137 MCG tablet TAKE 1 TABLET BY MOUTH DAILY.   • fenofibrate 160 MG tablet TAKE 1 TABLET BY MOUTH DAILY.   • digoxin (LANOXIN) 0.125 MG tablet TAKE 1 TABLET BY MOUTH DAILY.   • losartan (COZAAR) 50 MG tablet Take 1 tablet by mouth 2 times daily.   • EFFIENT 10 MG Tab TAKE 1 TABLET BY MOUTH DAILY.   • insulin glargine (BASAGLAR KWIKPEN) 100 UNIT/ML pen-injector Inject 35 Units into the skin nightly.   • omega-3 acid ethyl esters (LOVAZA) 1 g capsule TAKE 2 CAPSULES BY MOUTH 2 TIMES DAILY.   • calcitRIOL (ROCALTROL) 0.25 MCG capsule TAKE 1 CAPSULE BY MOUTH 2 TIMES DAILY.   • carvedilol (COREG) 25 MG tablet Take 1 tablet by mouth 2 times daily (with meals).   • albuterol-ipratropium 2.5 mg/0.5 mg (DUONEB) 0.5-2.5 (3) MG/3ML nebulizer solution Take 3 mLs by nebulization every 6 hours as needed for Wheezing.   • VENTOLIN  (90 BASE) MCG/ACT inhaler INHALE 2 PUFFS INTO THE LUNGS EVERY 4 HOURS AS NEEDED FOR SHORTNESS OF BREATH OR  WHEEZING.   • pravastatin (PRAVACHOL) 20 MG tablet Take 1 tablet by mouth nightly.   • amLODIPine (NORVASC) 10 MG tablet Take 10 mg by mouth daily.   • ranolazine (RANEXA) 500 MG 12 hr tablet Take 2 tablets by mouth 2 times daily.   • BREO ELLIPTA 100-25 MCG/INH diskus inhaler INHALE 1 PUFF INTO THE LUNGS DAILY.   • DISPENSE 125 mg 2 times daily. Magnesium Taurate 125 mg    • nitroGLYcerin (NITRO-DUR) 0.4 MG/HR Place 1 patch onto the skin daily. Remove patch 12 hours after applying   • ergocalciferol (DRISDOL) 71317 UNITS capsule Take one capsule by mouth twice weekly (Wednesdays and Saturdays)   • THIOCTIC ACID 300 MG Cap Take 1 capsule by mouth daily.   • Calcium Carb-Cholecalciferol (CALCIUM-VITAMIN D) 600-400 MG-UNIT Tab Take 1 tablet by mouth daily.   • aspirin 81 MG EC tablet Take 1 tablet by mouth daily.   • acetaminophen (TYLENOL) 500 MG tablet Take 1,000 mg by mouth every 6 hours as needed for Pain. 2 tabs (= 1,000 mg)   • Coenzyme Q-10 100 MG Cap Take 2 capsules by mouth daily.    • B Complex Vitamins (B COMPLEX PO) Take 1 tablet by mouth daily.   • zidovudine (RETROVIR) 300 MG tablet Take 1 tablet by mouth 2 times daily.   • lamiVUDine (EPIVIR) 150 MG tablet Take 1 tablet by mouth 2 times daily.   • efavirenz (SUSTIVA) 600 MG tablet Take 1 tablet by mouth nightly.   • Vitamin D, Ergocalciferol, 06348 units capsule TAKE TWICE A WEEK     No current facility-administered medications for this visit.      Facility-Administered Medications Ordered in Other Visits   Medication   • iopamidol (ISOVUE-370) 76 % injection       REVIEW OF SYSTEMS:  The rest of the cardiovascular, respiratory, GI, neuro, urinary and musculoskeletal and all other systems are reviewed and are negative.    PHYSICAL EXAM:  VITAL SIGNS:   Visit Vitals  /70   Pulse 74   Wt 82.6 kg   SpO2 99%   BMI 28.94 kg/m²     GENERAL:   Comfortable in no acute distress.  CHEST:  Clear to auscultation.  Normal respiratory effort.  CARDIOVASCULAR:   Regular rate and rhythm, S1 plus S2.  Neck no erythema swelling range of motion slightly decreased because of patient discomfort pain reproducible on the right side consistent with musculoskeletal pain. Sensations intact strength normal  Neurology: conscious alert oriented ×3 judgment and insight good mood and affect stable, congruent with affect, no delusion hallucinations or suicidal homicidal ideations      ASSESSMENT:   1. Anxiety    2. Hypothyroidism, unspecified type    3. Neck pain on right side    4. Hypertension        PLAN:  Orders below, will notify and manage accordingly. 1.cpm  2 cpm  3. Give her refill on her Flexeril discussed if not improving in the next few weeks to call for further evaluation and recommended physical therapy which she declined  4.call with bp chk from work  She We will keep appointment with her other specialists  Fu 3 months  Total time spent with the patient is 25 minutes and more than 50% of the time was spent in education, counseling and direct patient care.    Orders Placed This Encounter   • cyclobenzaprine (FLEXERIL) 5 MG tablet   • clonazePAM (KLONOPIN) 0.5 MG tablet       · Medication side effects, risk and benefits discussed.  Patient is aware of that and is willing to take the medication.  · Patient instructions. Advised the patient if symptoms get worse, develops any new symptoms, concerns, questions, problems, to give me a call or come in.  If it is after hours the patient should call the on-call physician or go to the emergency room.  The patient knows when to call us and when to go to the ER.  Follow up with me after above testing.  All plans discussed with the patient at length.  The patient understands and agrees.  · Testing Instructions:  I discussed with the patient that if they don't hear from me in 2-3 weeks for any kind of testing that I do, to give me a call because I always call even if the tests are normal.  So, if the patient doesn't hear from me in 2-3  weeks for any testing that was done the patient should give me a call.  The patient understands and agrees.

## 2025-01-30 NOTE — TELEPHONE ENCOUNTER
Wife informed of US instructions, had lab on 1/28 but did not take the pill at 11pm the night before, please advise

## 2025-02-01 LAB
ALDOST SERPL-MCNC: 6.9 NG/DL
ALDOST/RENIN PLAS-RTO: 5.8 RATIO
RENIN PLAS-CCNC: 1.2 NG/ML/HR

## 2025-02-03 ENCOUNTER — HOSPITAL ENCOUNTER (OUTPATIENT)
Dept: RADIOLOGY | Facility: HOSPITAL | Age: 70
Discharge: HOME | End: 2025-02-03
Attending: FAMILY MEDICINE
Payer: COMMERCIAL

## 2025-02-03 DIAGNOSIS — E11.59 HYPERTENSION ASSOCIATED WITH DIABETES: ICD-10-CM

## 2025-02-03 DIAGNOSIS — I15.2 HYPERTENSION ASSOCIATED WITH DIABETES: ICD-10-CM

## 2025-02-03 DIAGNOSIS — D35.02 ADENOMA OF LEFT ADRENAL GLAND: ICD-10-CM

## 2025-02-03 PROCEDURE — 93975 VASCULAR STUDY: CPT | Mod: 26,,, | Performed by: RADIOLOGY

## 2025-02-03 PROCEDURE — 76770 US EXAM ABDO BACK WALL COMP: CPT | Mod: TC,PO

## 2025-02-03 PROCEDURE — 76770 US EXAM ABDO BACK WALL COMP: CPT | Mod: 26,59,, | Performed by: RADIOLOGY

## 2025-02-04 ENCOUNTER — LAB VISIT (OUTPATIENT)
Dept: LAB | Facility: HOSPITAL | Age: 70
End: 2025-02-04
Attending: FAMILY MEDICINE
Payer: COMMERCIAL

## 2025-02-04 DIAGNOSIS — D35.02 ADENOMA OF LEFT ADRENAL GLAND: ICD-10-CM

## 2025-02-04 LAB — CORTIS SERPL-MCNC: 1.1 UG/DL (ref 4.3–22.4)

## 2025-02-04 PROCEDURE — 82533 TOTAL CORTISOL: CPT | Performed by: FAMILY MEDICINE

## 2025-02-04 PROCEDURE — 36415 COLL VENOUS BLD VENIPUNCTURE: CPT | Mod: PO | Performed by: FAMILY MEDICINE

## 2025-02-18 ENCOUNTER — RESULTS FOLLOW-UP (OUTPATIENT)
Dept: FAMILY MEDICINE | Facility: CLINIC | Age: 70
End: 2025-02-18

## 2025-02-18 DIAGNOSIS — N28.1 CYST OF RIGHT KIDNEY: Primary | ICD-10-CM

## 2025-03-05 ENCOUNTER — TELEPHONE (OUTPATIENT)
Dept: DIABETES | Facility: CLINIC | Age: 70
End: 2025-03-05
Payer: COMMERCIAL

## 2025-03-05 NOTE — TELEPHONE ENCOUNTER
----- Message from Crista sent at 3/5/2025  4:38 PM CST -----  Contact: [phone]  Type:  Needs Medical AdviceWho Called: JIM CAREY [[id number]]Symptoms (please be specific): need to talk to the nurse How long has patient had these symptoms:  Pharmacy name and phone #:  Would the patient rather a call back or a response via [facility]? Call [facility] Call Back Number:  [phone] Information: [id number]

## 2025-03-05 NOTE — TELEPHONE ENCOUNTER
----- Message from Fannie sent at 3/5/2025  2:44 PM CST -----  Regarding: Problem with Katheryn Monitor  Pt has freestyle katheryn monitor 2 on arm already and has tried to connect on phone, but cannot. It states that it should be freestyle katheryn monitor 3. If you could give him a call at [phone], it would be much appreciated. Thank you!

## 2025-03-06 ENCOUNTER — CLINICAL SUPPORT (OUTPATIENT)
Dept: DIABETES | Facility: CLINIC | Age: 70
End: 2025-03-06
Payer: COMMERCIAL

## 2025-03-06 ENCOUNTER — TELEPHONE (OUTPATIENT)
Dept: DIABETES | Facility: CLINIC | Age: 70
End: 2025-03-06
Payer: COMMERCIAL

## 2025-03-06 DIAGNOSIS — E11.65 TYPE 2 DIABETES MELLITUS WITH HYPERGLYCEMIA, UNSPECIFIED WHETHER LONG TERM INSULIN USE: Primary | ICD-10-CM

## 2025-03-06 PROCEDURE — 99999 PR PBB SHADOW E&M-EST. PATIENT-LVL I: CPT | Mod: PBBFAC,,, | Performed by: DIETITIAN, REGISTERED

## 2025-03-06 NOTE — TELEPHONE ENCOUNTER
----- Message from Med Assistant Saldaña sent at 3/5/2025  4:48 PM CST -----  Contact: Misha  Type:  Patient Returning CallWhmerlene Called: Yandel Left Message for Patient: Leif the patient know what this is regarding?: NoWould the patient rather a call back or a response via [facility]? Call[facility] Call Back Number: [phone] Information:

## 2025-03-06 NOTE — TELEPHONE ENCOUNTER
Spoke with patient to assist with scheduling an appointment with education to help troubleshoot why reagan won't connect with his phone.

## 2025-03-06 NOTE — LETTER
March 6, 2025    Misha Simpson  [address]  [facility] [address]             [facility]  Diabetes  [address]  [place] [address]  Phone: [phone]  Fax: [phone]   March 6, 2025     Patient: Misha Simpson   YOB: 1955   Date of Visit: 3/6/2025       To Whom it May Concern:    Misha Simpson was seen virtually on 3/6/2025. He may return with no restrictions.    Please excuse him from any classes or work missed.    If you have any questions or concerns, please don't hesitate to call.    Sincerely,         Evelyn Parker RD, [facility]

## 2025-03-11 NOTE — PROGRESS NOTES
Diabetes Care Specialist Progress Note  Author: Evelyn Parker RD, [facility]  Date: 3/13/2025    Intake    Program Intake  Reason for Diabetes Program Visit:: Intervention  Type of Intervention:: Individual  Individual: Education  Education: Other (Issues pairing Katheryn 2 to kamar.)  Current diabetes risk level:: moderate  In the last month, have you used the ER or been admitted to the hospital: No  Permission to speak with others about care:: no    Current Diabetes Treatment: Oral Medications  Oral Medication Type/Dose: Jardiance 25 mg, Metformin 1000 mg, Actos 15 mg    Continuous Glucose Monitoring  Patient has CGM: Yes  Personal CGM type:: Katheryn 2    Lab Results   Component Value Date    HGBA1C 7.1 (H) 12/03/2024       Weight: 77.7 kg (171 lb 6.4 oz)   Height: 6' (182.9 cm)   Body mass index is 23.25 kg/m².    Diabetes Self-Management Skills Assessment    Medication Skills Assessment  Medication Skills Assessment Completed:: No  Deffered due to:: Time    Home Blood Glucose Monitoring  Patient states that blood sugar is checked at home daily.: no (Issues with pairing Katheryn 2 to phone)  Personal CGM type:: Katheryn 2  What is your A1c Target?: less than 7% with no lows  Home Blood Glucose Monitoring Skills Assessment Completed: : Yes  Assessment indicates:: Instruction Needed  Area of need?: Yes    Assessment Summary and Plan    Based on today's diabetes care assessment, the following areas of need were identified:      Identified Areas of Need      Medication/Current Diabetes Treatment:     Lifestyle Coping/Support:     Diabetes Disease Process/Treatment Options:     Nutrition/Healthy Eating:      Physical Activity/Exercise:      Home Blood Glucose Monitoring: Yes , see care plan   Acute Complications:      Chronic Complications:         Today's interventions were provided through individual discussion, instruction, and written materials were provided.      Patient verbalized understanding of instruction and written  materials.  Pt was able to return back demonstration of instructions today. Patient understood key points, needs reinforcement and further instruction.     Diabetes Self-Management Care Plan:    Today's Diabetes Self-Management Care Plan was developed with Misha's input. Misha has agreed to work toward the following goal(s) to improve his/her overall diabetes control.      Care Plan: Diabetes Management   Updates made since 3/13/2024 12:00 AM        Problem: Blood Glucose Self-Monitoring         Long-Range Goal: To pair Katheryn 2 with its kamar.    Start Date: 3/6/2025   Priority: High   Barriers: No Barriers Identified   Note:    Patient presents today unable to pair Katheryn 2 sensor to kamar.  The kamar has security/privacy settings his phone does not allow me to change. The kamar also does not have a way to enter username and password.  Deleted Katheryn 3 kamar from his phone. Started new account and deleted original listing off of [employer].      Katheryn 2 Username: [email or web address]  Password: [email or web address]         Follow Up Plan     Follow up if symptoms worsen or fail to improve.  Patient presents for troubleshooting to pair Katheryn 2 sensor to kamar. His phone was not able to change setting the Katheryn kamar was asking to change to allow pairing.  Deleted kamar and created new UN and PW and connected him to the clinic.    Today's care plan and follow up schedule was discussed with patient.  Misha verbalized understanding of the care plan, goals, and agrees to follow up plan.        The patient was encouraged to communicate with his/her health care provider/physician and care team regarding his/her condition(s) and treatment.  I provided the patient with my contact information today and encouraged to contact me via phone or [facility] as needed.     Length of Visit   Total Time: 30 Minutes

## 2025-03-13 ENCOUNTER — PATIENT OUTREACH (OUTPATIENT)
Dept: ADMINISTRATIVE | Facility: HOSPITAL | Age: 70
End: 2025-03-13
Payer: COMMERCIAL

## 2025-03-13 VITALS — HEIGHT: 72 IN | BODY MASS INDEX: 23.21 KG/M2 | WEIGHT: 171.38 LBS

## 2025-03-13 NOTE — PROGRESS NOTES
HTN Report - Pt has not been able to check BP in awhile. Says he will check it soon. Will call back in 1 week.

## 2025-03-20 ENCOUNTER — TELEPHONE (OUTPATIENT)
Dept: DIABETES | Facility: CLINIC | Age: 70
End: 2025-03-20
Payer: COMMERCIAL

## 2025-03-20 NOTE — TELEPHONE ENCOUNTER
I attempted to call and assist  with rescheduling his appointment with Ercia but received no answer. I was unable to leave a voicemail so I attempted to call his wife .  answered and said hello and once I asked for  no one said anything there was only music playing and the line disconnected.

## 2025-03-26 ENCOUNTER — TELEPHONE (OUTPATIENT)
Dept: PHARMACY | Facility: CLINIC | Age: 70
End: 2025-03-26
Payer: COMMERCIAL

## 2025-03-26 NOTE — TELEPHONE ENCOUNTER
[facility] Chart Review & Patient Outreach Details For Medication Adherence Project    Reason for Outreach Encounter: 3rd Party payor non-compliance report ([employer], [employer], C, etc)  2.  Patient Outreach Method: Reviewed Patient Chart  3.   Medication in question: Jardiance   LAST FILLED: 2/28/25 for 30 day supply  Diabetes Medications              empagliflozin (JARDIANCE) 25 mg tablet Take 1 tablet (25 mg total) by mouth once daily.    metFORMIN (GLUCOPHAGE-XR) 500 MG ER 24hr tablet Take 500 mg by mouth 2 (two) times daily with meals. Lunch and supper    pioglitazone (ACTOS) 15 MG tablet Take 1 tablet (15 mg total) by mouth once daily.              4.  Reviewed and or Updates Made To: Patient Chart  5. Outreach Outcomes and/or actions taken: Patient filled medication and is on track to be adherent

## 2025-04-04 ENCOUNTER — LAB VISIT (OUTPATIENT)
Dept: LAB | Facility: HOSPITAL | Age: 70
End: 2025-04-04
Attending: FAMILY MEDICINE
Payer: COMMERCIAL

## 2025-04-04 DIAGNOSIS — Z79.899 ENCOUNTER FOR LONG-TERM (CURRENT) USE OF OTHER MEDICATIONS: ICD-10-CM

## 2025-04-04 DIAGNOSIS — E11.65 TYPE 2 DIABETES MELLITUS WITH HYPERGLYCEMIA, UNSPECIFIED WHETHER LONG TERM INSULIN USE: ICD-10-CM

## 2025-04-04 DIAGNOSIS — E11.59 HYPERTENSION ASSOCIATED WITH DIABETES: ICD-10-CM

## 2025-04-04 DIAGNOSIS — I15.2 HYPERTENSION ASSOCIATED WITH DIABETES: ICD-10-CM

## 2025-04-04 LAB
ALBUMIN SERPL BCP-MCNC: 3.7 G/DL (ref 3.5–5.2)
ALBUMIN/CREAT UR: NORMAL
ALP SERPL-CCNC: 92 UNIT/L (ref 40–150)
ALT SERPL W/O P-5'-P-CCNC: 15 UNIT/L (ref 10–44)
ANION GAP (OHS): 7 MMOL/L (ref 8–16)
AST SERPL-CCNC: 37 UNIT/L (ref 11–45)
BILIRUB SERPL-MCNC: 0.9 MG/DL (ref 0.1–1)
BUN SERPL-MCNC: 13 MG/DL (ref 8–23)
CALCIUM SERPL-MCNC: 9.2 MG/DL (ref 8.7–10.5)
CHLORIDE SERPL-SCNC: 106 MMOL/L (ref 95–110)
CO2 SERPL-SCNC: 26 MMOL/L (ref 23–29)
CREAT SERPL-MCNC: 1.2 MG/DL (ref 0.5–1.4)
CREAT UR-MCNC: 80 MG/DL (ref 23–375)
EAG (OHS): 146 MG/DL (ref 68–131)
GFR SERPLBLD CREATININE-BSD FMLA CKD-EPI: >60 ML/MIN/1.73/M2
GLUCOSE SERPL-MCNC: 132 MG/DL (ref 70–110)
HBA1C MFR BLD: 6.7 % (ref 4–5.6)
MICROALBUMIN UR-MCNC: <5 UG/ML (ref ?–5000)
POTASSIUM SERPL-SCNC: 4.2 MMOL/L (ref 3.5–5.1)
PROT SERPL-MCNC: 7.4 GM/DL (ref 6–8.4)
SODIUM SERPL-SCNC: 139 MMOL/L (ref 136–145)
VIT B12 SERPL-MCNC: 553 PG/ML (ref 210–950)

## 2025-04-04 PROCEDURE — 36415 COLL VENOUS BLD VENIPUNCTURE: CPT | Mod: PO

## 2025-04-04 PROCEDURE — 83036 HEMOGLOBIN GLYCOSYLATED A1C: CPT

## 2025-04-04 PROCEDURE — 80053 COMPREHEN METABOLIC PANEL: CPT

## 2025-04-04 PROCEDURE — 82607 VITAMIN B-12: CPT

## 2025-04-04 PROCEDURE — 82570 ASSAY OF URINE CREATININE: CPT

## 2025-04-07 ENCOUNTER — RESULTS FOLLOW-UP (OUTPATIENT)
Dept: FAMILY MEDICINE | Facility: CLINIC | Age: 70
End: 2025-04-07

## 2025-04-07 ENCOUNTER — TELEPHONE (OUTPATIENT)
Dept: FAMILY MEDICINE | Facility: CLINIC | Age: 70
End: 2025-04-07
Payer: COMMERCIAL

## 2025-04-07 DIAGNOSIS — E11.9 TYPE 2 DIABETES MELLITUS WITHOUT COMPLICATION, UNSPECIFIED WHETHER LONG TERM INSULIN USE: Primary | ICD-10-CM

## 2025-04-10 RX ORDER — FLASH GLUCOSE SENSOR
KIT MISCELLANEOUS
Qty: 6 KIT | Refills: 3 | Status: SHIPPED | OUTPATIENT
Start: 2025-04-10

## 2025-04-10 NOTE — TELEPHONE ENCOUNTER
Refill Decision Note   Misha Simpson  is requesting a refill authorization.  Brief Assessment and Rationale for Refill:        Medication Therapy Plan:             Comments:     Note composed:11:00 AM 04/10/2025

## 2025-04-10 NOTE — TELEPHONE ENCOUNTER
No care due was identified.  [facility] Embedded Care Due Messages. Reference number: [id number].   4/10/2025 9:36:06 AM CDT

## 2025-04-28 ENCOUNTER — TELEPHONE (OUTPATIENT)
Dept: FAMILY MEDICINE | Facility: CLINIC | Age: 70
End: 2025-04-28
Payer: COMMERCIAL

## 2025-04-28 NOTE — TELEPHONE ENCOUNTER
----- Message from Maria Victoria sent at 4/28/2025 12:06 PM CDT -----  Contact: Sara  .Type:  Patient Returning CallWho Called: Sara /wife Who Left Message for Patient: nasrin Does the patient know what this is regarding?: Symptoms/appt Would the patient rather a call back or a response via [facility]?  Call Best Call Back Number:.[phone] Additional Information:

## 2025-04-28 NOTE — TELEPHONE ENCOUNTER
Wife reports patient having diarrhea for past 3 days, unable to eat, drinking water, advised electrolyte replacement and ER if unable to drink or urinate.  Wife does not know if stool has been watery or soft but he has been going every few minutes and immodium not helping, please advise

## 2025-04-28 NOTE — TELEPHONE ENCOUNTER
----- Message from Nicole sent at 4/28/2025  8:33 AM CDT -----  Contact: Sara  Type: Needs Medical AdviceWho Called:  Sara/ WifeSymptoms (please be specific):  DiarrheaHow long has patient had these symptoms:  3 daysPharmacy name and phone #:  [facility] [place] - [address] [address]Yzbbv: [phone] Fax: [phone] Call Back Number: [phone] Information: PT has been taking OTC Imodium it has not helped any

## 2025-05-05 ENCOUNTER — TELEPHONE (OUTPATIENT)
Dept: OPHTHALMOLOGY | Facility: CLINIC | Age: 70
End: 2025-05-05
Payer: COMMERCIAL

## 2025-05-05 NOTE — TELEPHONE ENCOUNTER
Called patient in regards to scheduling overdue eye exam. Booked patient for next available appointment and mailed out appointment slip per patient request. Patient expressed understanding.

## 2025-05-30 RX ORDER — FLASH GLUCOSE SENSOR
KIT MISCELLANEOUS
Qty: 6 KIT | Refills: 3 | Status: SHIPPED | OUTPATIENT
Start: 2025-05-30

## 2025-05-30 NOTE — TELEPHONE ENCOUNTER
Copied from CRM #[id number]. Topic: Medications - Medication Refill  >> May 30, 2025  9:59 AM Yanique wrote:  Type:  RX Refill Request    Who Called: dmitry    Refill or New Rx: renewal    RX Name and Strength: FREESTYLE RAMA 2 SENSOR Kit    How is the patient currently taking it? (ex. 1XDay):as directed    Is this a 30 day or 90 day RX: 90    Preferred Pharmacy with phone number:     [facility] - [place] - [address] [facility] [address]  Phone: [phone] Fax: [phone]      Local or Mail Order:local     Ordering Provider: sofía     Would the patient rather a call back or a response via [facility]?  Call after sending    Best Call Back Number: [phone]    Additional Information:  please send and call to advise    Thanks

## 2025-05-30 NOTE — TELEPHONE ENCOUNTER
No care due was identified.  [facility] Embedded Care Due Messages. Reference number: [id number].   5/30/2025 10:31:01 AM CDT

## 2025-06-05 ENCOUNTER — TELEPHONE (OUTPATIENT)
Dept: FAMILY MEDICINE | Facility: CLINIC | Age: 70
End: 2025-06-05
Payer: COMMERCIAL

## 2025-06-09 ENCOUNTER — TELEPHONE (OUTPATIENT)
Dept: DIABETES | Facility: CLINIC | Age: 70
End: 2025-06-09
Payer: COMMERCIAL

## 2025-06-09 NOTE — TELEPHONE ENCOUNTER
"Pt states the sensor is not reading. The message says that "the sensor is too loose." He states he had this same issue before when the sensor stopped working. Instructed pt to remove old sensor and apply a new one. Writer offered an appt with education to see if it is user error, he refused stating he will just replace the old sensor.   "

## 2025-06-18 ENCOUNTER — TELEPHONE (OUTPATIENT)
Dept: PHARMACY | Facility: CLINIC | Age: 70
End: 2025-06-18
Payer: COMMERCIAL

## 2025-06-18 NOTE — TELEPHONE ENCOUNTER
[facility] Chart Review & Patient Outreach Details For Medication Adherence Project    Reason for Outreach Encounter: 3rd Party payor non-compliance report ([employer], [employer]BS, [employer], etc)  2.  Patient Outreach Method: Reviewed patient chart  and [employer] message  3.   Medication in question:    Diabetes Medications              empagliflozin (JARDIANCE) 25 mg tablet Take 1 tablet (25 mg total) by mouth once daily.    metFORMIN (GLUCOPHAGE-XR) 500 MG ER 24hr tablet Take 500 mg by mouth 2 (two) times daily with meals. Lunch and supper    pioglitazone (ACTOS) 15 MG tablet Take 1 tablet (15 mg total) by mouth once daily.                 Jardiance  last filled  2/28/25 for 30 day supply      4.  Reviewed and or Updates Made To: Patient Chart  5. Outreach Outcomes and/or actions taken: Sent inquiry to patient: Waiting for response  Additional Notes:

## 2025-06-24 ENCOUNTER — OFFICE VISIT (OUTPATIENT)
Dept: OPTOMETRY | Facility: CLINIC | Age: 70
End: 2025-06-24
Payer: COMMERCIAL

## 2025-06-24 DIAGNOSIS — H40.013 OAG (OPEN ANGLE GLAUCOMA) SUSPECT, LOW RISK, BILATERAL: ICD-10-CM

## 2025-06-24 DIAGNOSIS — E11.36 CATARACT ASSOCIATED WITH TYPE 2 DIABETES MELLITUS: ICD-10-CM

## 2025-06-24 DIAGNOSIS — E11.9 DIABETES MELLITUS TYPE 2 WITHOUT RETINOPATHY: Primary | ICD-10-CM

## 2025-06-24 DIAGNOSIS — H43.393 VITREOUS FLOATERS, BILATERAL: ICD-10-CM

## 2025-06-24 PROCEDURE — 1101F PT FALLS ASSESS-DOCD LE1/YR: CPT | Mod: CPTII,S$GLB,, | Performed by: OPTOMETRIST

## 2025-06-24 PROCEDURE — 92133 CPTRZD OPH DX IMG PST SGM ON: CPT | Mod: S$GLB,,, | Performed by: OPTOMETRIST

## 2025-06-24 PROCEDURE — 99999 PR PBB SHADOW E&M-EST. PATIENT-LVL III: CPT | Mod: PBBFAC,,, | Performed by: OPTOMETRIST

## 2025-06-24 PROCEDURE — 3066F NEPHROPATHY DOC TX: CPT | Mod: CPTII,S$GLB,, | Performed by: OPTOMETRIST

## 2025-06-24 PROCEDURE — 3044F HG A1C LEVEL LT 7.0%: CPT | Mod: CPTII,S$GLB,, | Performed by: OPTOMETRIST

## 2025-06-24 PROCEDURE — 3061F NEG MICROALBUMINURIA REV: CPT | Mod: CPTII,S$GLB,, | Performed by: OPTOMETRIST

## 2025-06-24 PROCEDURE — 3288F FALL RISK ASSESSMENT DOCD: CPT | Mod: CPTII,S$GLB,, | Performed by: OPTOMETRIST

## 2025-06-24 PROCEDURE — 2023F DILAT RTA XM W/O RTNOPTHY: CPT | Mod: CPTII,S$GLB,, | Performed by: OPTOMETRIST

## 2025-06-24 PROCEDURE — 1126F AMNT PAIN NOTED NONE PRSNT: CPT | Mod: CPTII,S$GLB,, | Performed by: OPTOMETRIST

## 2025-06-24 PROCEDURE — 1159F MED LIST DOCD IN RCRD: CPT | Mod: CPTII,S$GLB,, | Performed by: OPTOMETRIST

## 2025-06-24 PROCEDURE — 92014 COMPRE OPH EXAM EST PT 1/>: CPT | Mod: S$GLB,,, | Performed by: OPTOMETRIST

## 2025-06-24 NOTE — PROGRESS NOTES
HPI    Routine dm-dle-11/23    Pt denies any blurred va. Trouble driving at night with light glare. No   glasses. Denies any flashes or floaters. BSL controlled.     Hemoglobin A1C       Date                     Value               Ref Range             Status                03/19/2025               7.3 (H)             4.7 - 5.6 %           Final                 12/03/2024               7.1 (H)             4.7 - 5.6 %           Final                 10/04/2024               6.3 (H)             4.0 - 5.6 %           Final              Comment:    ADA Screening Guidelines:  5.7-6.4%  Consistent with   prediabetes  >or=6.5%  Consistent with diabetes    High levels of fetal   hemoglobin interfere with the HbA1C  assay. Heterozygous hemoglobin   variants (HbS, HgC, etc)do  not significantly interfere with this assay.     However, presence of multiple variants may affect accuracy.         07/30/2024               7.0 (H)             4.7 - 5.6 %           Final                 07/05/2024               6.8 (H)             4.0 - 5.6 %           Final              Comment:    ADA Screening Guidelines:  5.7-6.4%  Consistent with   prediabetes  >or=6.5%  Consistent with diabetes    High levels of fetal   hemoglobin interfere with the HbA1C  assay. Heterozygous hemoglobin   variants (HbS, HgC, etc)do  not significantly interfere with this assay.     However, presence of multiple variants may affect accuracy.         04/05/2024               7.0 (H)             4.0 - 5.6 %           Final              Comment:    ADA Screening Guidelines:  5.7-6.4%  Consistent with   prediabetes  >or=6.5%  Consistent with diabetes    High levels of fetal   hemoglobin interfere with the HbA1C  assay. Heterozygous hemoglobin   variants (HbS, HgC, etc)do  not significantly interfere with this assay.     However, presence of multiple variants may affect accuracy.    ----------  Hemoglobin A1c       Date                     Value               Ref  Range             Status                04/04/2025               6.7 (H)             4.0 - 5.6 %           Final              Comment:    ADA Screening Guidelines:  5.7-6.4%  Consistent with prediabetes  >=6.5%    Consistent with diabetes    High levels of fetal hemoglobin interfere   with the HbA1C  assay. Heterozygous hemoglobin variants (HbS, HgC,   etc)do  not significantly interfere with this assay.   However, presence   of multiple variants may affect accuracy.  ----------    Last edited by Lisa Vasquez on 6/24/2025  3:38 PM.            Assessment /Plan     For exam results, see Encounter Report.    Diabetes mellitus type 2 without retinopathy    Cataract associated with type 2 diabetes mellitus    Vitreous floaters, bilateral    OAG (open angle glaucoma) suspect, low risk, bilateral  -     Posterior Segment OCT Optic Nerve- Both eyes      No matty/ no csme, gave Diabetic Retinopathy info, advise tight control glucose, BP---Advise annual dilated fundus exam  2.   Vis sig NS w/ early cortical --not ready for consult, gave info, cautions night driving, CE possible 2-4 yrs   3.   RD precautions given and reviewed. Patient knows to call/ message if any further changes in symptoms occur.  4.    Large c/d suspect w/ remote fhx  Angles open  Low IOP      Thin  /483    OCT rnfl 6/2025   G 96 wnl  G 92 wnl       OCT 11/2023   G 96 wnl  G 93 wnl     OCT 11/2021  G 97 wnl  G 94 wnl  No progression\     Monitor / continue no tx     Ok continue with otc only   No new refraction        Discussed and educated patient on current findings /plan.  RTC 1 year, prn if any changes / issues

## 2025-06-24 NOTE — LETTER
June 24, 2025      [facility]  [address] [place] [address]  Phone: [phone]  Fax: [phone]       Patient: Misha Simpson   YOB: 1955  Date of Visit: 06/24/2025    To Whom It May Concern:    Lizabeth Simpson  was at [facility] on 06/24/2025. The patient may return to work on 06/26/2025 with no restrictions. If you have any questions or concerns, or if I can be of further assistance, please do not hesitate to contact me.    Sincerely,    Dr. Kent

## (undated) DEVICE — SEE MEDLINE ITEM 157131

## (undated) DEVICE — PAD CAST SPECIALIST STRL 6

## (undated) DEVICE — DRAPE STERI U-SHAPED 47X51IN

## (undated) DEVICE — SYR LUER LOCK TIP 60ML

## (undated) DEVICE — UNDERGLOVES BIOGEL PI SZ 7 LF

## (undated) DEVICE — BLADE ULTRACUT 3.5MM

## (undated) DEVICE — NDL SPINAL 18GX3.5 SPINOCAN

## (undated) DEVICE — NEPTUNE 4 PORT MANIFOLD

## (undated) DEVICE — NDL HYPO A BEVEL 18X1 1/2

## (undated) DEVICE — DRAPE EMERALD 87X114.75X113

## (undated) DEVICE — PUMP COLD THERAPY

## (undated) DEVICE — NDL HYPO A BEVEL 22X1 1/2

## (undated) DEVICE — SYR 30CC LUER LOCK

## (undated) DEVICE — SEE MEDLINE ITEM 157216

## (undated) DEVICE — BLADE SURG CARBON STEEL SZ11

## (undated) DEVICE — APPLICATOR CHLORAPREP ORN 26ML

## (undated) DEVICE — GAUZE SPONGE 4X4 12PLY

## (undated) DEVICE — SUT 3-0 ETHILON 18 FS-1

## (undated) DEVICE — SEE MEDLINE ITEM 146292

## (undated) DEVICE — TUBE SET INFLOW/OUTFLOW

## (undated) DEVICE — ARTHROSCOPIC ENERGY 90 DEGREE PROBE WITH SUCTION

## (undated) DEVICE — Device

## (undated) DEVICE — SEE MEDLINE ITEM 152530

## (undated) DEVICE — DRAPE PLASTIC U 60X72

## (undated) DEVICE — PAD COLD THERAPY KNEE WRAP ON

## (undated) DEVICE — SOL IRR NACL .9% 3000ML

## (undated) DEVICE — DRESSING XEROFORM FOIL PK 1X8

## (undated) DEVICE — UNDERGLOVE BIOGEL PI SZ 6.5 LF

## (undated) DEVICE — SEE MEDLINE ITEM 146313